# Patient Record
Sex: FEMALE | Race: WHITE | Employment: FULL TIME | URBAN - NONMETROPOLITAN AREA
[De-identification: names, ages, dates, MRNs, and addresses within clinical notes are randomized per-mention and may not be internally consistent; named-entity substitution may affect disease eponyms.]

---

## 2017-02-13 PROBLEM — J01.00 ACUTE MAXILLARY SINUSITIS: Status: ACTIVE | Noted: 2017-02-13

## 2017-03-15 DIAGNOSIS — R05.9 COUGH: Primary | ICD-10-CM

## 2017-03-15 DIAGNOSIS — J45.21 MILD INTERMITTENT ASTHMA WITH ACUTE EXACERBATION: ICD-10-CM

## 2017-03-15 RX ORDER — METHYLPREDNISOLONE 4 MG/1
TABLET ORAL
Qty: 1 KIT | Refills: 0 | Status: SHIPPED | OUTPATIENT
Start: 2017-03-15 | End: 2017-05-02 | Stop reason: ALTCHOICE

## 2017-03-15 RX ORDER — ALBUTEROL SULFATE 90 UG/1
2 AEROSOL, METERED RESPIRATORY (INHALATION) EVERY 6 HOURS PRN
Qty: 1 INHALER | Refills: 0 | Status: SHIPPED | OUTPATIENT
Start: 2017-03-15 | End: 2019-09-13 | Stop reason: SDUPTHER

## 2017-05-01 ENCOUNTER — HOSPITAL ENCOUNTER (OUTPATIENT)
Age: 42
Discharge: HOME | End: 2017-05-01
Payer: COMMERCIAL

## 2017-05-01 DIAGNOSIS — E78.49 OTHER HYPERLIPIDEMIA: ICD-10-CM

## 2017-05-01 LAB
ALBUMIN SERPL-MCNC: 4.8 G/DL (ref 3.5–5.2)
ALBUMIN/GLOBULIN RATIO: 1.8 (ref 1–2.5)
ALP BLD-CCNC: 76 U/L (ref 35–104)
ALT SERPL-CCNC: 24 U/L (ref 5–33)
ANION GAP SERPL CALCULATED.3IONS-SCNC: 15 MMOL/L (ref 9–17)
AST SERPL-CCNC: 20 U/L
BILIRUB SERPL-MCNC: 0.32 MG/DL (ref 0.3–1.2)
BUN BLDV-MCNC: 11 MG/DL (ref 6–20)
BUN/CREAT BLD: 20 (ref 9–20)
CALCIUM SERPL-MCNC: 9.9 MG/DL (ref 8.6–10.4)
CHLORIDE BLD-SCNC: 101 MMOL/L (ref 98–107)
CHOLESTEROL/HDL RATIO: 5.4
CHOLESTEROL: 252 MG/DL
CO2: 22 MMOL/L (ref 20–31)
CREAT SERPL-MCNC: 0.56 MG/DL (ref 0.5–0.9)
GFR AFRICAN AMERICAN: >60 ML/MIN
GFR NON-AFRICAN AMERICAN: >60 ML/MIN
GFR SERPL CREATININE-BSD FRML MDRD: ABNORMAL ML/MIN/{1.73_M2}
GFR SERPL CREATININE-BSD FRML MDRD: ABNORMAL ML/MIN/{1.73_M2}
GLUCOSE BLD-MCNC: 105 MG/DL (ref 70–99)
HCT VFR BLD CALC: 44.7 % (ref 36–46)
HDLC SERPL-MCNC: 47 MG/DL
HEMOGLOBIN: 15 G/DL (ref 12–16)
HIGH SENSITIVE C-REACTIVE PROTEIN: 2.8 MG/L
LDL CHOLESTEROL: 144 MG/DL (ref 0–130)
MCH RBC QN AUTO: 31.2 PG (ref 26–34)
MCHC RBC AUTO-ENTMCNC: 33.6 G/DL (ref 31–37)
MCV RBC AUTO: 92.9 FL (ref 80–100)
PDW BLD-RTO: 12.4 % (ref 12.1–15.2)
PLATELET # BLD: 187 K/UL (ref 140–450)
PMV BLD AUTO: NORMAL FL (ref 6–12)
POTASSIUM SERPL-SCNC: 4.4 MMOL/L (ref 3.7–5.3)
RBC # BLD: 4.81 M/UL (ref 4–5.2)
SODIUM BLD-SCNC: 138 MMOL/L (ref 135–144)
TOTAL PROTEIN: 7.5 G/DL (ref 6.4–8.3)
TRIGL SERPL-MCNC: 303 MG/DL
VLDLC SERPL CALC-MCNC: ABNORMAL MG/DL (ref 1–30)
WBC # BLD: 7 K/UL (ref 3.5–11)

## 2017-05-01 PROCEDURE — 80061 LIPID PANEL: CPT

## 2017-05-01 PROCEDURE — 85027 COMPLETE CBC AUTOMATED: CPT

## 2017-05-01 PROCEDURE — 86141 C-REACTIVE PROTEIN HS: CPT

## 2017-05-01 PROCEDURE — 80053 COMPREHEN METABOLIC PANEL: CPT

## 2017-09-13 DIAGNOSIS — J06.9 UPPER RESPIRATORY TRACT INFECTION, UNSPECIFIED TYPE: ICD-10-CM

## 2017-09-13 RX ORDER — AZITHROMYCIN 250 MG/1
TABLET, FILM COATED ORAL
Qty: 1 PACKET | Refills: 0 | Status: SHIPPED | OUTPATIENT
Start: 2017-09-13 | End: 2017-10-20 | Stop reason: ALTCHOICE

## 2017-10-20 PROBLEM — R05.9 COUGH: Status: ACTIVE | Noted: 2017-10-20

## 2017-10-20 PROBLEM — J40 BRONCHITIS: Status: ACTIVE | Noted: 2017-10-20

## 2017-10-20 PROBLEM — J01.90 ACUTE SINUSITIS: Status: ACTIVE | Noted: 2017-02-13

## 2017-11-16 ENCOUNTER — HOSPITAL ENCOUNTER (OUTPATIENT)
Dept: LAB | Age: 42
Discharge: HOME | End: 2017-11-16
Payer: COMMERCIAL

## 2017-11-16 DIAGNOSIS — E78.5 HYPERLIPIDEMIA, UNSPECIFIED: ICD-10-CM

## 2017-11-16 DIAGNOSIS — R73.9 HYPERGLYCEMIA: ICD-10-CM

## 2017-11-16 DIAGNOSIS — I10 ESSENTIAL HYPERTENSION: ICD-10-CM

## 2017-11-16 LAB
ALT SERPL-CCNC: 148 U/L (ref 5–33)
AST SERPL-CCNC: 106 U/L
ESTIMATED AVERAGE GLUCOSE: 111 MG/DL
HBA1C MFR BLD: 5.5 % (ref 4.8–5.9)

## 2017-11-16 PROCEDURE — 84450 TRANSFERASE (AST) (SGOT): CPT

## 2017-11-16 PROCEDURE — 83036 HEMOGLOBIN GLYCOSYLATED A1C: CPT

## 2017-11-16 PROCEDURE — 84460 ALANINE AMINO (ALT) (SGPT): CPT

## 2017-11-17 ENCOUNTER — OFFICE VISIT (OUTPATIENT)
Dept: FAMILY MEDICINE CLINIC | Age: 42
End: 2017-11-17
Payer: COMMERCIAL

## 2017-11-17 VITALS
HEIGHT: 66 IN | DIASTOLIC BLOOD PRESSURE: 78 MMHG | BODY MASS INDEX: 31.79 KG/M2 | SYSTOLIC BLOOD PRESSURE: 124 MMHG | WEIGHT: 197.8 LBS

## 2017-11-17 DIAGNOSIS — R73.9 HYPERGLYCEMIA: ICD-10-CM

## 2017-11-17 DIAGNOSIS — J06.9 UPPER RESPIRATORY TRACT INFECTION, UNSPECIFIED TYPE: ICD-10-CM

## 2017-11-17 DIAGNOSIS — Z23 NEED FOR IMMUNIZATION AGAINST INFLUENZA: ICD-10-CM

## 2017-11-17 DIAGNOSIS — R74.8 ELEVATED LIVER ENZYMES: Primary | ICD-10-CM

## 2017-11-17 DIAGNOSIS — E66.09 OBESITY DUE TO EXCESS CALORIES, UNSPECIFIED CLASSIFICATION, UNSPECIFIED WHETHER SERIOUS COMORBIDITY PRESENT: ICD-10-CM

## 2017-11-17 DIAGNOSIS — E78.49 OTHER HYPERLIPIDEMIA: ICD-10-CM

## 2017-11-17 PROCEDURE — 90688 IIV4 VACCINE SPLT 0.5 ML IM: CPT | Performed by: FAMILY MEDICINE

## 2017-11-17 PROCEDURE — 99214 OFFICE O/P EST MOD 30 MIN: CPT | Performed by: FAMILY MEDICINE

## 2017-11-17 PROCEDURE — 90471 IMMUNIZATION ADMIN: CPT | Performed by: FAMILY MEDICINE

## 2017-11-17 NOTE — PROGRESS NOTES
h/o    Urinary incontinence       Social History   Substance Use Topics    Smoking status: Former Smoker    Smokeless tobacco: Never Used      Comment: socially    Alcohol use 0.0 oz/week      Comment: occ      Current Outpatient Prescriptions   Medication Sig Dispense Refill    sertraline (ZOLOFT) 25 MG tablet Take 1 tablet by mouth daily 90 tablet 1    lovastatin (MEVACOR) 10 MG tablet Take 1 tablet by mouth nightly 90 tablet 3    loratadine (CLARITIN) 10 MG tablet Take 10 mg by mouth as needed       fluticasone-salmeterol (ADVAIR DISKUS) 250-50 MCG/DOSE AEPB Inhale 1 puff into the lungs every 12 hours 1 Inhaler 2    albuterol sulfate HFA (PROAIR HFA) 108 (90 BASE) MCG/ACT inhaler Inhale 2 puffs into the lungs every 6 hours as needed for Wheezing 1 Inhaler 0    Omega-3 Fatty Acids (FISH OIL) 1000 MG CAPS Take 3,000 mg by mouth nightly       Spacer/Aero-Holding Chambers (PlatformQ ROBBY) NATANAEL 1 Device by Does not apply route daily 1 Device 0    Ibuprofen (MOTRIN PO) Take  by mouth.  docusate sodium (COLACE) 100 MG capsule Take 1 capsule by mouth daily as needed for Constipation. 30 capsule 0     No current facility-administered medications for this visit. Allergies   Allergen Reactions    Latex      Redness, swelling, rash       PHYSICAL EXAM:    /78   Ht 5' 6\" (1.676 m)   Wt 197 lb 12.8 oz (89.7 kg)   BMI 31.93 kg/m²   Wt Readings from Last 3 Encounters:   11/17/17 197 lb 12.8 oz (89.7 kg)   06/05/17 186 lb (84.4 kg)   05/02/17 187 lb (84.8 kg)     BP Readings from Last 3 Encounters:   11/17/17 124/78   05/02/17 132/88   02/13/17 126/76       General Appearance: in no acute distress, well developed, well nourished. Eyes: pupils equal, round reactive to light and accommodation. Ears: normal canal and TM on the right. L TM dull. Nose: nares patent, no lesions. Oral Cavity: mucosa moist.  Throat: clear, floppy uvula.    Neck/Thyroid: neck supple, full range of motion, no cervical lymphadenopathy, no thyromegaly or carotid bruits. Skin: warm and dry. No suspicious lesions. Heart: regular rate and rhythm. No murmurs. S1, S2 normal, no gallops. Rate:70  Lungs: clear to auscultation bilaterally. Abdomen: bowel sounds present, soft, nontender, nondistended, no masses or organomegaly. Obese. Musculoskeletal: normal, full range of motion in knees and hips, no swelling or tenderness. Extremities: no cyanosis or edema. Peripheral Pulses: 2+ throughout, symetric. Neurologic: nonfocal, motor strength normal upper and lower extremities, sensory exam intact. Psych: normal affect, speech fluent. ASSESSMENT:  1. Elevated liver enzymes  ALT    AST   2. Hyperglycemia     3. Other hyperlipidemia     4. Obesity due to excess calories, unspecified classification, unspecified whether serious comorbidity present     5. Upper respiratory tract infection, unspecified type     6. Need for immunization against influenza  INFLUENZA, QUADV, 3 YRS AND OLDER, IM, MDV, 0.5ML (Komal Yousif)       PLAN:  She feels that her liver enzymes are affected by carbs. She just got back from vacation and had been drinking more alcohol than usual. I recommend that she wait a few weeks and have her liver enzymes checked again. I don't think that she needs medications for any of this at this time. I review her hemoglobin A1C level. Her A1C level is 5.5. I am pleased with this. The fullness in her ear sounds like eustachian tube dysfunction. She can take nasonex for this. We discuss blood pressure guidelines and how these are dependent upon each individual and their disease states and risk factors. We discuss her BMI. She is in the obese category. I encourage her to continue to make physical fitness a priority in order to try to get out of the obese category. She will get a flu shot today. I will see her back in 6 months.        Orders Placed This Encounter   Procedures    Evan Sayres, 3 YRS AND OLDER, IM, MDV, 0.5ML (Gomez Victor)    ALT     Standing Status:   Future     Standing Expiration Date:   11/17/2018    AST     Standing Status:   Future     Standing Expiration Date:   11/17/2018     No orders of the defined types were placed in this encounter.       Scribed by: FRAN Reich

## 2017-11-17 NOTE — PATIENT INSTRUCTIONS
PLAN:  She feels that her liver enzymes are affected by carbs. She just got back from vacation and had been drinking more alcohol than usual. I recommend that she wait a few weeks and have her liver enzymes checked again. I don't think that she needs medications for any of this at this time. I review her hemoglobin A1C level. Her A1C level is 5.5. I am pleased with this. The fullness in her ear sounds like eustachian tube dysfunction. She can take nasonex for this. We discuss blood pressure guidelines and how these are dependent upon each individual and their disease states and risk factors. We discuss her BMI. She is in the obese category. I encourage her to continue to make physical fitness a priority in order to try to get out of the obese category. She will get a flu shot today. I will see her back in 6 months. Patient Education        Influenza (Flu) Vaccine (Inactivated or Recombinant): What You Need to Know  Why get vaccinated? Influenza (\"flu\") is a contagious disease that spreads around the [facility] every winter, usually between October and May. Flu is caused by influenza viruses and is spread mainly by coughing, sneezing, and close contact. Anyone can get flu. Flu strikes suddenly and can last several days. Symptoms vary by age, but can include:  · Fever/chills. · Sore throat. · Muscle aches. · Fatigue. · Cough. · Headache. · Runny or stuffy nose. Flu can also lead to pneumonia and blood infections, and cause diarrhea and seizures in children. If you have a medical condition, such as heart or lung disease, flu can make it worse. Flu is more dangerous for some people. Infants and young children, people 72years of age and older, pregnant women, and people with certain health conditions or a weakened immune system are at greatest risk. Each year thousands of people in the [facility] die from flu, and many more are hospitalized.   Flu vaccine can:  · Keep you from getting flu. · Make flu less severe if you do get it. · Keep you from spreading flu to your family and other people. Inactivated and recombinant flu vaccines  A dose of flu vaccine is recommended every flu season. Children 6 months through 6years of age may need two doses during the same flu season. Everyone else needs only one dose each flu season. Some inactivated flu vaccines contain a very small amount of a mercury-based preservative called thimerosal. Studies have not shown thimerosal in vaccines to be harmful, but flu vaccines that do not contain thimerosal are available. There is no live flu virus in flu shots. They cannot cause the flu. There are many flu viruses, and they are always changing. Each year a new flu vaccine is made to protect against three or four viruses that are likely to cause disease in the upcoming flu season. But even when the vaccine doesn't exactly match these viruses, it may still provide some protection. Flu vaccine cannot prevent:  · Flu that is caused by a virus not covered by the vaccine. · Illnesses that look like flu but are not. Some people should not get this vaccine  Tell the person who is giving you the vaccine:  · If you have any severe (life-threatening) allergies. If you ever had a life-threatening allergic reaction after a dose of flu vaccine, or have a severe allergy to any part of this vaccine, you may be advised not to get vaccinated. Most, but not all, types of flu vaccine contain a small amount of egg protein. · If you ever had Guillain-Barré syndrome (also called GBS) Some people with a history of GBS should not get this vaccine. This should be discussed with your doctor. · If you are not feeling well. It is usually okay to get flu vaccine when you have a mild illness, but you might be asked to come back when you feel better. Risks of a vaccine reaction  With any medicine, including vaccines, there is a chance of reactions.  These are usually mild and go away on their own, but serious reactions are also possible. Most people who get a flu shot do not have any problems with it. Minor problems following a flu shot include:  · Soreness, redness, or swelling where the shot was given  · Hoarseness  · Sore, red or itchy eyes  · Cough  · Fever  · Aches  · Headache  · Itching  · Fatigue  If these problems occur, they usually begin soon after the shot and last 1 or 2 days. More serious problems following a flu shot can include the following:  · There may be a small increased risk of Guillain-Barré Syndrome (GBS) after inactivated flu vaccine. This risk has been estimated at 1 or 2 additional cases per million people vaccinated. This is much lower than the risk of severe complications from flu, which can be prevented by flu vaccine. · [place] children who get the flu shot along with pneumococcal vaccine (PCV13) and/or DTaP vaccine at the same time might be slightly more likely to have a seizure caused by fever. Ask your doctor for more information. Tell your doctor if a child who is getting flu vaccine has ever had a seizure  Problems that could happen after any injected vaccine:  · People sometimes faint after a medical procedure, including vaccination. Sitting or lying down for about 15 minutes can help prevent fainting, and injuries caused by a fall. Tell your doctor if you feel dizzy, or have vision changes or ringing in the ears. · Some people get severe pain in the shoulder and have difficulty moving the arm where a shot was given. This happens very rarely. · Any medication can cause a severe allergic reaction. Such reactions from a vaccine are very rare, estimated at about 1 in a million doses, and would happen within a few minutes to a few hours after the vaccination. As with any medicine, there is a very remote chance of a vaccine causing a serious injury or death. The safety of vaccines is always being monitored.  For more information, visit: www.cdc.gov/vaccinesafety/. What if there is a serious reaction? What should I look for? · Look for anything that concerns you, such as signs of a severe allergic reaction, very high fever, or unusual behavior. Signs of a severe allergic reaction can include hives, swelling of the face and throat, difficulty breathing, a fast heartbeat, dizziness, and weakness  usually within a few minutes to a few hours after the vaccination. What should I do? · If you think it is a severe allergic reaction or other emergency that can't wait, call 9-1-1 and get the person to the nearest hospital. Otherwise, call your doctor. · Reactions should be reported to the \"Vaccine Adverse Event Reporting System\" (VAERS). Your doctor should file this report, or you can do it yourself through the VAERS website at www.vaers. Predictify.gov, or by calling 2-927.953.3672. VAERS does not give medical advice. The National Vaccine Injury Compensation Program  The National Vaccine Injury Compensation Program (VICP) is a federal program that was created to compensate people who may have been injured by certain vaccines. Persons who believe they may have been injured by a vaccine can learn about the program and about filing a claim by calling 4-889.767.3817 or visiting the [facility] website at www.[facility].gov/vaccinecompensation. There is a time limit to file a claim for compensation. How can I learn more? · Ask your healthcare provider. He or she can give you the vaccine package insert or suggest other sources of information. · Call your local or state health department. · Contact the Centers for Disease Control and Prevention (CDC):  ¨ Call 3-692.148.1894 (1-800-CDC-INFO) or  ¨ Visit CDC's website at www.cdc.gov/flu  Vaccine Information Statement  Inactivated Influenza Vaccine  8/7/2015)  42 JESS Lindsey 790UI-29  Department of Health and Human Services  Centers for Disease Control and Prevention  Many Vaccine Information Statements are available in Latvian and other languages. See www.immunize.org/vis. Muchas hojas de información sobre vacunas están disponibles en español y en otros idiomas. Visite www.immunize.org/vis. Care instructions adapted under license by [facility] ([facility]). If you have questions about a medical condition or this instruction, always ask your healthcare professional. [place] [address] any warranty or liability for your use of this information.

## 2017-12-18 RX ORDER — SERTRALINE HYDROCHLORIDE 25 MG/1
25 TABLET, FILM COATED ORAL DAILY
Qty: 90 TABLET | Refills: 1 | Status: SHIPPED | OUTPATIENT
Start: 2017-12-18 | End: 2018-07-04 | Stop reason: SDUPTHER

## 2018-04-12 PROBLEM — R05.9 COUGH: Status: RESOLVED | Noted: 2017-10-20 | Resolved: 2018-04-12

## 2018-05-14 ENCOUNTER — TELEPHONE (OUTPATIENT)
Dept: FAMILY MEDICINE CLINIC | Age: 43
End: 2018-05-14

## 2018-05-14 DIAGNOSIS — I15.8 OTHER SECONDARY HYPERTENSION: ICD-10-CM

## 2018-05-14 DIAGNOSIS — E78.49 OTHER HYPERLIPIDEMIA: Primary | ICD-10-CM

## 2018-05-14 DIAGNOSIS — R73.9 HYPERGLYCEMIA: ICD-10-CM

## 2018-05-15 ENCOUNTER — HOSPITAL ENCOUNTER (OUTPATIENT)
Age: 43
Discharge: HOME | End: 2018-05-15
Payer: COMMERCIAL

## 2018-05-15 DIAGNOSIS — R73.9 HYPERGLYCEMIA: ICD-10-CM

## 2018-05-15 DIAGNOSIS — E78.49 OTHER HYPERLIPIDEMIA: ICD-10-CM

## 2018-05-15 DIAGNOSIS — I15.8 OTHER SECONDARY HYPERTENSION: ICD-10-CM

## 2018-05-15 LAB
ALBUMIN SERPL-MCNC: 4.5 G/DL (ref 3.5–5.2)
ALBUMIN/GLOBULIN RATIO: 1.6 (ref 1–2.5)
ALP BLD-CCNC: 67 U/L (ref 35–104)
ALT SERPL-CCNC: 108 U/L (ref 5–33)
ANION GAP SERPL CALCULATED.3IONS-SCNC: 13 MMOL/L (ref 9–17)
AST SERPL-CCNC: 82 U/L
BILIRUB SERPL-MCNC: 0.37 MG/DL (ref 0.3–1.2)
BUN BLDV-MCNC: 12 MG/DL (ref 6–20)
BUN/CREAT BLD: 22 (ref 9–20)
CALCIUM SERPL-MCNC: 9.5 MG/DL (ref 8.6–10.4)
CHLORIDE BLD-SCNC: 101 MMOL/L (ref 98–107)
CHOLESTEROL/HDL RATIO: 5.9
CHOLESTEROL: 275 MG/DL
CO2: 25 MMOL/L (ref 20–31)
CREAT SERPL-MCNC: 0.55 MG/DL (ref 0.5–0.9)
ESTIMATED AVERAGE GLUCOSE: 117 MG/DL
GFR AFRICAN AMERICAN: >60 ML/MIN
GFR NON-AFRICAN AMERICAN: >60 ML/MIN
GFR SERPL CREATININE-BSD FRML MDRD: ABNORMAL ML/MIN/{1.73_M2}
GFR SERPL CREATININE-BSD FRML MDRD: ABNORMAL ML/MIN/{1.73_M2}
GLUCOSE BLD-MCNC: 98 MG/DL (ref 70–99)
HBA1C MFR BLD: 5.7 % (ref 4.8–5.9)
HCT VFR BLD CALC: 43.5 % (ref 36.3–47.1)
HDLC SERPL-MCNC: 47 MG/DL
HEMOGLOBIN: 14.6 G/DL (ref 11.9–15.1)
HIGH SENSITIVE C-REACTIVE PROTEIN: 2.5 MG/L
LDL CHOLESTEROL: 176 MG/DL (ref 0–130)
MCH RBC QN AUTO: 32.1 PG (ref 25.2–33.5)
MCHC RBC AUTO-ENTMCNC: 33.6 G/DL (ref 28.4–34.8)
MCV RBC AUTO: 95.6 FL (ref 82.6–102.9)
NRBC AUTOMATED: 0 PER 100 WBC
PDW BLD-RTO: 12.3 % (ref 11.8–14.4)
PLATELET # BLD: 206 K/UL (ref 138–453)
PMV BLD AUTO: 10.2 FL (ref 8.1–13.5)
POTASSIUM SERPL-SCNC: 4.6 MMOL/L (ref 3.7–5.3)
RBC # BLD: 4.55 M/UL (ref 3.95–5.11)
SODIUM BLD-SCNC: 139 MMOL/L (ref 135–144)
TOTAL PROTEIN: 7.4 G/DL (ref 6.4–8.3)
TRIGL SERPL-MCNC: 260 MG/DL
VLDLC SERPL CALC-MCNC: ABNORMAL MG/DL (ref 1–30)
WBC # BLD: 6.5 K/UL (ref 3.5–11.3)

## 2018-05-15 PROCEDURE — 80061 LIPID PANEL: CPT

## 2018-05-15 PROCEDURE — 85027 COMPLETE CBC AUTOMATED: CPT

## 2018-05-15 PROCEDURE — 86141 C-REACTIVE PROTEIN HS: CPT

## 2018-05-15 PROCEDURE — 36415 COLL VENOUS BLD VENIPUNCTURE: CPT

## 2018-05-15 PROCEDURE — 80053 COMPREHEN METABOLIC PANEL: CPT

## 2018-05-15 PROCEDURE — 83036 HEMOGLOBIN GLYCOSYLATED A1C: CPT

## 2018-05-18 ENCOUNTER — OFFICE VISIT (OUTPATIENT)
Dept: FAMILY MEDICINE CLINIC | Age: 43
End: 2018-05-18
Payer: COMMERCIAL

## 2018-05-18 VITALS
DIASTOLIC BLOOD PRESSURE: 70 MMHG | HEIGHT: 66 IN | BODY MASS INDEX: 30.53 KG/M2 | WEIGHT: 190 LBS | SYSTOLIC BLOOD PRESSURE: 112 MMHG

## 2018-05-18 DIAGNOSIS — E78.49 OTHER HYPERLIPIDEMIA: ICD-10-CM

## 2018-05-18 DIAGNOSIS — J45.909 ASTHMA, UNSPECIFIED ASTHMA SEVERITY, UNSPECIFIED WHETHER COMPLICATED, UNSPECIFIED WHETHER PERSISTENT: ICD-10-CM

## 2018-05-18 DIAGNOSIS — L57.0 KERATOSIS: ICD-10-CM

## 2018-05-18 DIAGNOSIS — R74.8 ELEVATED LIVER ENZYMES: Primary | ICD-10-CM

## 2018-05-18 DIAGNOSIS — R73.9 HYPERGLYCEMIA: ICD-10-CM

## 2018-05-18 DIAGNOSIS — I10 ESSENTIAL HYPERTENSION: ICD-10-CM

## 2018-05-18 PROCEDURE — 99214 OFFICE O/P EST MOD 30 MIN: CPT | Performed by: FAMILY MEDICINE

## 2018-05-18 PROCEDURE — 17000 DESTRUCT PREMALG LESION: CPT | Performed by: FAMILY MEDICINE

## 2018-05-18 ASSESSMENT — PATIENT HEALTH QUESTIONNAIRE - PHQ9
SUM OF ALL RESPONSES TO PHQ QUESTIONS 1-9: 0
1. LITTLE INTEREST OR PLEASURE IN DOING THINGS: 0
2. FEELING DOWN, DEPRESSED OR HOPELESS: 0
SUM OF ALL RESPONSES TO PHQ9 QUESTIONS 1 & 2: 0

## 2018-05-23 ENCOUNTER — HOSPITAL ENCOUNTER (OUTPATIENT)
Age: 43
Setting detail: SPECIMEN
Discharge: HOME | End: 2018-05-23
Payer: COMMERCIAL

## 2018-05-23 DIAGNOSIS — R74.8 ELEVATED LIVER ENZYMES: ICD-10-CM

## 2018-05-23 PROCEDURE — 86317 IMMUNOASSAY INFECTIOUS AGENT: CPT

## 2018-05-23 PROCEDURE — 86790 VIRUS ANTIBODY NOS: CPT

## 2018-05-23 PROCEDURE — 86803 HEPATITIS C AB TEST: CPT

## 2018-05-23 PROCEDURE — 36415 COLL VENOUS BLD VENIPUNCTURE: CPT

## 2018-05-23 PROCEDURE — 87340 HEPATITIS B SURFACE AG IA: CPT

## 2018-05-24 LAB
HBV SURFACE AB TITR SER: >1000 MIU/ML
HEPATITIS B SURFACE ANTIGEN: NONREACTIVE
HEPATITIS C ANTIBODY: NONREACTIVE

## 2018-05-27 LAB — HEPATITIS E AB, IGG: NEGATIVE

## 2018-05-29 ENCOUNTER — TELEPHONE (OUTPATIENT)
Dept: FAMILY MEDICINE CLINIC | Age: 43
End: 2018-05-29

## 2018-05-29 DIAGNOSIS — R74.8 ELEVATED LIVER ENZYMES: Primary | ICD-10-CM

## 2018-06-07 ENCOUNTER — HOSPITAL ENCOUNTER (OUTPATIENT)
Dept: ULTRASOUND IMAGING | Age: 43
Discharge: HOME | End: 2018-06-09
Payer: COMMERCIAL

## 2018-06-07 ENCOUNTER — HOSPITAL ENCOUNTER (OUTPATIENT)
Age: 43
Discharge: HOME | End: 2018-06-07
Payer: COMMERCIAL

## 2018-06-07 DIAGNOSIS — R74.8 ELEVATED LIVER ENZYMES: ICD-10-CM

## 2018-06-07 LAB
CERULOPLASMIN: 28 MG/DL (ref 16–45)
FERRITIN: 169 UG/L (ref 13–150)
IRON SATURATION: 45 % (ref 20–55)
IRON: 128 UG/DL (ref 37–145)
TOTAL IRON BINDING CAPACITY: 284 UG/DL (ref 250–450)
TSH SERPL DL<=0.05 MIU/L-ACNC: 1.61 MIU/L (ref 0.3–5)
UNSATURATED IRON BINDING CAPACITY: 155.9 UG/DL (ref 112–347)

## 2018-06-07 PROCEDURE — 86038 ANTINUCLEAR ANTIBODIES: CPT

## 2018-06-07 PROCEDURE — 83550 IRON BINDING TEST: CPT

## 2018-06-07 PROCEDURE — 76705 ECHO EXAM OF ABDOMEN: CPT

## 2018-06-07 PROCEDURE — 83516 IMMUNOASSAY NONANTIBODY: CPT

## 2018-06-07 PROCEDURE — 84443 ASSAY THYROID STIM HORMONE: CPT

## 2018-06-07 PROCEDURE — 83540 ASSAY OF IRON: CPT

## 2018-06-07 PROCEDURE — 82728 ASSAY OF FERRITIN: CPT

## 2018-06-07 PROCEDURE — 36415 COLL VENOUS BLD VENIPUNCTURE: CPT

## 2018-06-07 PROCEDURE — 82390 ASSAY OF CERULOPLASMIN: CPT

## 2018-06-08 LAB
ANTI-NUCLEAR ANTIBODY (ANA): NEGATIVE
SMOOTH MUSCLE ANTIBODY: 4 UNITS (ref 0–19)

## 2018-07-06 RX ORDER — SERTRALINE HYDROCHLORIDE 25 MG/1
25 TABLET, FILM COATED ORAL DAILY
Qty: 90 TABLET | Refills: 1 | Status: SHIPPED | OUTPATIENT
Start: 2018-07-06 | End: 2019-01-25 | Stop reason: SDUPTHER

## 2018-09-25 RX ORDER — LOVASTATIN 10 MG/1
10 TABLET ORAL NIGHTLY
Qty: 90 TABLET | Refills: 3 | Status: SHIPPED | OUTPATIENT
Start: 2018-09-25 | End: 2019-01-18 | Stop reason: ALTCHOICE

## 2018-09-25 NOTE — TELEPHONE ENCOUNTER
From: Alexa Pate  Sent: 9/25/2018 9:42 AM EDT  Subject: Medication Renewal Request    Alexa Pate would like a refill of the following medications:     lovastatin (MEVACOR) 10 MG tablet Genevieve Leahy MD]    Preferred pharmacy: [address] [place] [address], [address] [place] [address]    Comment:

## 2018-10-02 DIAGNOSIS — T30.0 BURN: Primary | ICD-10-CM

## 2018-11-12 ENCOUNTER — OFFICE VISIT (OUTPATIENT)
Dept: PRIMARY CARE CLINIC | Age: 43
End: 2018-11-12
Payer: COMMERCIAL

## 2018-11-12 VITALS
SYSTOLIC BLOOD PRESSURE: 129 MMHG | DIASTOLIC BLOOD PRESSURE: 101 MMHG | HEART RATE: 79 BPM | BODY MASS INDEX: 32.14 KG/M2 | WEIGHT: 199.1 LBS | TEMPERATURE: 97.7 F

## 2018-11-12 DIAGNOSIS — J01.40 ACUTE NON-RECURRENT PANSINUSITIS: Primary | ICD-10-CM

## 2018-11-12 PROCEDURE — 99213 OFFICE O/P EST LOW 20 MIN: CPT | Performed by: NURSE PRACTITIONER

## 2018-11-12 RX ORDER — AMOXICILLIN AND CLAVULANATE POTASSIUM 875; 125 MG/1; MG/1
1 TABLET, FILM COATED ORAL 2 TIMES DAILY
Qty: 20 TABLET | Refills: 0 | Status: SHIPPED | OUTPATIENT
Start: 2018-11-12 | End: 2018-11-22

## 2018-11-12 RX ORDER — PREDNISONE 20 MG/1
40 TABLET ORAL DAILY
Qty: 10 TABLET | Refills: 0 | Status: SHIPPED | OUTPATIENT
Start: 2018-11-12 | End: 2018-11-17

## 2018-11-12 ASSESSMENT — ENCOUNTER SYMPTOMS
SORE THROAT: 1
SINUS PRESSURE: 1
SWOLLEN GLANDS: 0
SHORTNESS OF BREATH: 0
COUGH: 1
HOARSE VOICE: 0
SINUS COMPLAINT: 1

## 2018-11-12 NOTE — PROGRESS NOTES
and dry. No rash noted. Nursing note and vitals reviewed. BP (!) 129/101 (Site: Left Upper Arm, Position: Sitting, Cuff Size: Large Adult)   Pulse 79   Temp 97.7 °F (36.5 °C) (Temporal)   Wt 199 lb 1.6 oz (90.3 kg)   BMI 32.14 kg/m²     Assessment:      Diagnosis Orders   1. Acute non-recurrent pansinusitis  amoxicillin-clavulanate (AUGMENTIN) 875-125 MG per tablet    predniSONE (DELTASONE) 20 MG tablet       Plan:             Discussed exam, POCT findings, plan of care (including prescriptive and supportive as listed below) and follow-up atlength with patient and or Patient. Reviewed all prescribed and recommended medications, administration and side effects. Encouraged to return to [address] [place] [address] for noimprovement and or worsening of symptoms. To ER or call 911 if any difficulty breathing, shortness of breath, inability to swallow, hives or temp greater than 103 degrees. Questions answered. They verbalized understandingand agreement. Return if symptoms worsen or fail to improve. Orders Placed This Encounter   Medications    amoxicillin-clavulanate (AUGMENTIN) 875-125 MG per tablet     Sig: Take 1 tablet by mouth 2 times daily for 10 days     Dispense:  20 tablet     Refill:  0    predniSONE (DELTASONE) 20 MG tablet     Sig: Take 2 tablets by mouth daily for 5 days     Dispense:  10 tablet     Refill:  0          All patient questions answered. Pt voiced understanding.       Electronically signed by DYAN Santana CNP on 11/12/2018 at 12:27 PM

## 2019-01-15 ENCOUNTER — TELEPHONE (OUTPATIENT)
Dept: FAMILY MEDICINE CLINIC | Age: 44
End: 2019-01-15

## 2019-01-15 DIAGNOSIS — I10 ESSENTIAL HYPERTENSION: Primary | ICD-10-CM

## 2019-01-15 DIAGNOSIS — R73.9 HYPERGLYCEMIA: ICD-10-CM

## 2019-01-15 DIAGNOSIS — E78.49 OTHER HYPERLIPIDEMIA: ICD-10-CM

## 2019-01-17 ENCOUNTER — HOSPITAL ENCOUNTER (OUTPATIENT)
Age: 44
Setting detail: SPECIMEN
Discharge: HOME | End: 2019-01-17
Payer: COMMERCIAL

## 2019-01-17 DIAGNOSIS — R73.9 HYPERGLYCEMIA: ICD-10-CM

## 2019-01-17 DIAGNOSIS — I10 ESSENTIAL HYPERTENSION: ICD-10-CM

## 2019-01-17 DIAGNOSIS — E78.49 OTHER HYPERLIPIDEMIA: ICD-10-CM

## 2019-01-17 LAB
ALBUMIN SERPL-MCNC: 4.8 G/DL (ref 3.5–5.2)
ALBUMIN/GLOBULIN RATIO: 2.2 (ref 1–2.5)
ALP BLD-CCNC: 66 U/L (ref 35–104)
ALT SERPL-CCNC: 78 U/L (ref 5–33)
ANION GAP SERPL CALCULATED.3IONS-SCNC: 14 MMOL/L (ref 9–17)
AST SERPL-CCNC: 50 U/L
BILIRUB SERPL-MCNC: 0.53 MG/DL (ref 0.3–1.2)
BUN BLDV-MCNC: 12 MG/DL (ref 6–20)
BUN/CREAT BLD: 18 (ref 9–20)
CALCIUM SERPL-MCNC: 10.2 MG/DL (ref 8.6–10.4)
CHLORIDE BLD-SCNC: 101 MMOL/L (ref 98–107)
CHOLESTEROL/HDL RATIO: 6.2
CHOLESTEROL: 316 MG/DL
CO2: 23 MMOL/L (ref 20–31)
CREAT SERPL-MCNC: 0.66 MG/DL (ref 0.5–0.9)
ESTIMATED AVERAGE GLUCOSE: 114 MG/DL
GFR AFRICAN AMERICAN: >60 ML/MIN
GFR NON-AFRICAN AMERICAN: >60 ML/MIN
GFR SERPL CREATININE-BSD FRML MDRD: ABNORMAL ML/MIN/{1.73_M2}
GFR SERPL CREATININE-BSD FRML MDRD: ABNORMAL ML/MIN/{1.73_M2}
GLUCOSE BLD-MCNC: 125 MG/DL (ref 70–99)
HBA1C MFR BLD: 5.6 % (ref 4.8–5.9)
HCT VFR BLD CALC: 44.2 % (ref 36.3–47.1)
HDLC SERPL-MCNC: 51 MG/DL
HEMOGLOBIN: 14.9 G/DL (ref 11.9–15.1)
HIGH SENSITIVE C-REACTIVE PROTEIN: 2.4 MG/L
LDL CHOLESTEROL: 216 MG/DL (ref 0–130)
MCH RBC QN AUTO: 32.7 PG (ref 25.2–33.5)
MCHC RBC AUTO-ENTMCNC: 33.7 G/DL (ref 28.4–34.8)
MCV RBC AUTO: 97.1 FL (ref 82.6–102.9)
NRBC AUTOMATED: 0 PER 100 WBC
PDW BLD-RTO: 12.1 % (ref 11.8–14.4)
PLATELET # BLD: 226 K/UL (ref 138–453)
PMV BLD AUTO: 10.6 FL (ref 8.1–13.5)
POTASSIUM SERPL-SCNC: 4.5 MMOL/L (ref 3.7–5.3)
RBC # BLD: 4.55 M/UL (ref 3.95–5.11)
SODIUM BLD-SCNC: 138 MMOL/L (ref 135–144)
TOTAL PROTEIN: 7 G/DL (ref 6.4–8.3)
TRIGL SERPL-MCNC: 246 MG/DL
VLDLC SERPL CALC-MCNC: ABNORMAL MG/DL (ref 1–30)
WBC # BLD: 5.2 K/UL (ref 3.5–11.3)

## 2019-01-17 PROCEDURE — 80061 LIPID PANEL: CPT

## 2019-01-17 PROCEDURE — 85027 COMPLETE CBC AUTOMATED: CPT

## 2019-01-17 PROCEDURE — 80053 COMPREHEN METABOLIC PANEL: CPT

## 2019-01-17 PROCEDURE — 86141 C-REACTIVE PROTEIN HS: CPT

## 2019-01-17 PROCEDURE — 36415 COLL VENOUS BLD VENIPUNCTURE: CPT

## 2019-01-17 PROCEDURE — 83036 HEMOGLOBIN GLYCOSYLATED A1C: CPT

## 2019-01-18 ENCOUNTER — OFFICE VISIT (OUTPATIENT)
Dept: FAMILY MEDICINE CLINIC | Age: 44
End: 2019-01-18
Payer: COMMERCIAL

## 2019-01-18 VITALS
SYSTOLIC BLOOD PRESSURE: 126 MMHG | WEIGHT: 191.6 LBS | DIASTOLIC BLOOD PRESSURE: 88 MMHG | HEIGHT: 66 IN | BODY MASS INDEX: 30.79 KG/M2

## 2019-01-18 DIAGNOSIS — E78.5 HYPERLIPIDEMIA, UNSPECIFIED HYPERLIPIDEMIA TYPE: ICD-10-CM

## 2019-01-18 DIAGNOSIS — K76.0 FATTY LIVER DISEASE, NONALCOHOLIC: Primary | ICD-10-CM

## 2019-01-18 DIAGNOSIS — R73.9 HYPERGLYCEMIA: ICD-10-CM

## 2019-01-18 DIAGNOSIS — I10 HYPERTENSION, UNSPECIFIED TYPE: ICD-10-CM

## 2019-01-18 PROCEDURE — 99214 OFFICE O/P EST MOD 30 MIN: CPT | Performed by: FAMILY MEDICINE

## 2019-01-18 RX ORDER — ROSUVASTATIN CALCIUM 10 MG/1
10 TABLET, COATED ORAL DAILY
Qty: 90 TABLET | Refills: 3 | Status: SHIPPED | OUTPATIENT
Start: 2019-01-18 | End: 2019-08-05 | Stop reason: ALTCHOICE

## 2019-01-25 RX ORDER — SERTRALINE HYDROCHLORIDE 25 MG/1
TABLET, FILM COATED ORAL
Qty: 90 TABLET | Refills: 1 | Status: SHIPPED | OUTPATIENT
Start: 2019-01-25 | End: 2019-08-28 | Stop reason: SDUPTHER

## 2019-03-07 ENCOUNTER — HOSPITAL ENCOUNTER (OUTPATIENT)
Age: 44
Discharge: HOME | End: 2019-03-07
Payer: COMMERCIAL

## 2019-07-18 ENCOUNTER — HOSPITAL ENCOUNTER (OUTPATIENT)
Age: 44
Discharge: HOME | End: 2019-07-18
Payer: COMMERCIAL

## 2019-07-18 DIAGNOSIS — E78.5 HYPERLIPIDEMIA, UNSPECIFIED HYPERLIPIDEMIA TYPE: ICD-10-CM

## 2019-07-18 DIAGNOSIS — R73.9 HYPERGLYCEMIA: ICD-10-CM

## 2019-07-18 LAB
ALBUMIN SERPL-MCNC: 4.7 G/DL (ref 3.5–5.2)
ALBUMIN/GLOBULIN RATIO: 1.9 (ref 1–2.5)
ALP BLD-CCNC: 72 U/L (ref 35–104)
ALT SERPL-CCNC: 31 U/L (ref 5–33)
ANION GAP SERPL CALCULATED.3IONS-SCNC: 12 MMOL/L (ref 9–17)
AST SERPL-CCNC: 25 U/L
BILIRUB SERPL-MCNC: 0.4 MG/DL (ref 0.3–1.2)
BUN BLDV-MCNC: 14 MG/DL (ref 6–20)
BUN/CREAT BLD: 24 (ref 9–20)
CALCIUM SERPL-MCNC: 9.7 MG/DL (ref 8.6–10.4)
CHLORIDE BLD-SCNC: 104 MMOL/L (ref 98–107)
CHOLESTEROL/HDL RATIO: 5.6
CHOLESTEROL: 252 MG/DL
CO2: 23 MMOL/L (ref 20–31)
CREAT SERPL-MCNC: 0.58 MG/DL (ref 0.5–0.9)
ESTIMATED AVERAGE GLUCOSE: 111 MG/DL
GFR AFRICAN AMERICAN: >60 ML/MIN
GFR NON-AFRICAN AMERICAN: >60 ML/MIN
GFR SERPL CREATININE-BSD FRML MDRD: ABNORMAL ML/MIN/{1.73_M2}
GFR SERPL CREATININE-BSD FRML MDRD: ABNORMAL ML/MIN/{1.73_M2}
GLUCOSE BLD-MCNC: 107 MG/DL (ref 70–99)
HBA1C MFR BLD: 5.5 % (ref 4.8–5.9)
HCT VFR BLD CALC: 44.9 % (ref 36.3–47.1)
HDLC SERPL-MCNC: 45 MG/DL
HEMOGLOBIN: 15.1 G/DL (ref 11.9–15.1)
HIGH SENSITIVE C-REACTIVE PROTEIN: 3.7 MG/L
LDL CHOLESTEROL: 153 MG/DL (ref 0–130)
MCH RBC QN AUTO: 32.9 PG (ref 25.2–33.5)
MCHC RBC AUTO-ENTMCNC: 33.6 G/DL (ref 28.4–34.8)
MCV RBC AUTO: 97.8 FL (ref 82.6–102.9)
NRBC AUTOMATED: 0 PER 100 WBC
PDW BLD-RTO: 12.2 % (ref 11.8–14.4)
PLATELET # BLD: 212 K/UL (ref 138–453)
PMV BLD AUTO: 10.6 FL (ref 8.1–13.5)
POTASSIUM SERPL-SCNC: 4.3 MMOL/L (ref 3.7–5.3)
RBC # BLD: 4.59 M/UL (ref 3.95–5.11)
SODIUM BLD-SCNC: 139 MMOL/L (ref 135–144)
TOTAL PROTEIN: 7.2 G/DL (ref 6.4–8.3)
TRIGL SERPL-MCNC: 272 MG/DL
VLDLC SERPL CALC-MCNC: ABNORMAL MG/DL (ref 1–30)
WBC # BLD: 8.1 K/UL (ref 3.5–11.3)

## 2019-07-18 PROCEDURE — 85027 COMPLETE CBC AUTOMATED: CPT

## 2019-07-18 PROCEDURE — 80061 LIPID PANEL: CPT

## 2019-07-18 PROCEDURE — 86141 C-REACTIVE PROTEIN HS: CPT

## 2019-07-18 PROCEDURE — 83036 HEMOGLOBIN GLYCOSYLATED A1C: CPT

## 2019-07-18 PROCEDURE — 36415 COLL VENOUS BLD VENIPUNCTURE: CPT

## 2019-07-18 PROCEDURE — 80053 COMPREHEN METABOLIC PANEL: CPT

## 2019-07-19 ENCOUNTER — OFFICE VISIT (OUTPATIENT)
Dept: FAMILY MEDICINE CLINIC | Age: 44
End: 2019-07-19
Payer: COMMERCIAL

## 2019-07-19 VITALS
HEIGHT: 66 IN | DIASTOLIC BLOOD PRESSURE: 68 MMHG | BODY MASS INDEX: 30.05 KG/M2 | WEIGHT: 187 LBS | SYSTOLIC BLOOD PRESSURE: 124 MMHG

## 2019-07-19 DIAGNOSIS — R10.9 FLANK PAIN: ICD-10-CM

## 2019-07-19 DIAGNOSIS — R73.9 HYPERGLYCEMIA: ICD-10-CM

## 2019-07-19 DIAGNOSIS — K76.0 FATTY LIVER DISEASE, NONALCOHOLIC: Primary | ICD-10-CM

## 2019-07-19 DIAGNOSIS — E78.49 OTHER HYPERLIPIDEMIA: ICD-10-CM

## 2019-07-19 LAB
BACTERIA URINE, POC: NORMAL
BILIRUBIN URINE: 0 MG/DL
BLOOD, URINE: NEGATIVE
CASTS URINE, POC: 0
CLARITY: CLEAR
COLOR: YELLOW
CRYSTALS URINE, POC: 0
EPI CELLS URINE, POC: NORMAL
GLUCOSE URINE: NORMAL
KETONES, URINE: NEGATIVE
LEUKOCYTE EST, POC: NORMAL
NITRITE, URINE: NEGATIVE
PH UA: 5 (ref 4.5–8)
PROTEIN UA: NEGATIVE
RBC URINE, POC: NORMAL
SPECIFIC GRAVITY UA: 1.01 (ref 1–1.03)
UROBILINOGEN, URINE: NORMAL
WBC URINE, POC: NORMAL
YEAST URINE, POC: 0

## 2019-07-19 PROCEDURE — 99214 OFFICE O/P EST MOD 30 MIN: CPT | Performed by: FAMILY MEDICINE

## 2019-07-19 PROCEDURE — 81000 URINALYSIS NONAUTO W/SCOPE: CPT | Performed by: FAMILY MEDICINE

## 2019-07-19 RX ORDER — EZETIMIBE 10 MG/1
10 TABLET ORAL DAILY
Qty: 90 TABLET | Refills: 3 | Status: SHIPPED | OUTPATIENT
Start: 2019-07-19 | End: 2020-03-09 | Stop reason: SDUPTHER

## 2019-07-19 RX ORDER — SULFAMETHOXAZOLE AND TRIMETHOPRIM 800; 160 MG/1; MG/1
1 TABLET ORAL 2 TIMES DAILY
Qty: 20 TABLET | Refills: 0 | Status: SHIPPED | OUTPATIENT
Start: 2019-07-19 | End: 2019-09-10

## 2019-07-19 ASSESSMENT — PATIENT HEALTH QUESTIONNAIRE - PHQ9
SUM OF ALL RESPONSES TO PHQ QUESTIONS 1-9: 0
1. LITTLE INTEREST OR PLEASURE IN DOING THINGS: 0
SUM OF ALL RESPONSES TO PHQ9 QUESTIONS 1 & 2: 0
2. FEELING DOWN, DEPRESSED OR HOPELESS: 0
SUM OF ALL RESPONSES TO PHQ QUESTIONS 1-9: 0

## 2019-07-19 NOTE — PROGRESS NOTES
Pt urine came back showing blood and leukocytes.  Per Dr. Mere Bustos treat with Bactrim DS BID x 7 days and repeat urine after    Also, Pts Livalo is not covered, Per Dr. Mere Bustos switch to Zetia 10 mg daily
Past Medical History:   Diagnosis Date    Allergic rhinitis 1995    Asthma 2011    Elevated liver enzymes 2011    Fatty liver disease, nonalcoholic 8/99/8334    Rufus's syndrome     Hyperlipidemia 2011    Hypertension 2011    h/o now controlled    Migraine     Obesity due to excess calories 11/17/2017    PIH (pregnancy induced hypertension) 2008    Pneumonia 2009    h/o    Urinary incontinence       Social History     Tobacco Use    Smoking status: Former Smoker    Smokeless tobacco: Never Used    Tobacco comment: socially   Substance Use Topics    Alcohol use: Yes     Alcohol/week: 0.0 standard drinks     Comment: occ      Current Outpatient Medications   Medication Sig Dispense Refill    Multiple Vitamins-Minerals (EQ MULTIVITAMINS ADULT GUMMY PO) Take by mouth daily      sulfamethoxazole-trimethoprim (BACTRIM DS) 800-160 MG per tablet Take 1 tablet by mouth 2 times daily 20 tablet 0    ezetimibe (ZETIA) 10 MG tablet Take 1 tablet by mouth daily 90 tablet 3    sertraline (ZOLOFT) 25 MG tablet TAKE 1 TABLET BY MOUTH ONCE DAILY 90 tablet 1    albuterol sulfate HFA (PROAIR HFA) 108 (90 BASE) MCG/ACT inhaler Inhale 2 puffs into the lungs every 6 hours as needed for Wheezing 1 Inhaler 0    loratadine (CLARITIN) 10 MG tablet Take 10 mg by mouth as needed       Spacer/Aero-Holding Chambers ([facility] ROBBY) NATANAEL 1 Device by Does not apply route daily 1 Device 0    Ibuprofen (MOTRIN PO) Take by mouth daily as needed       docusate sodium (COLACE) 100 MG capsule Take 1 capsule by mouth daily as needed for Constipation. 30 capsule 0    rosuvastatin (CRESTOR) 10 MG tablet Take 1 tablet by mouth daily (Patient not taking: Reported on 7/19/2019) 90 tablet 3     No current facility-administered medications for this visit.       Allergies   Allergen Reactions    Latex      Redness, swelling, rash       PHYSICAL EXAM:    /68   Ht 5' 6\" (1.676 m)   Wt 187 lb (84.8 kg)   BMI 30.18 kg/m²

## 2019-08-05 ENCOUNTER — TELEPHONE (OUTPATIENT)
Dept: FAMILY MEDICINE CLINIC | Age: 44
End: 2019-08-05

## 2019-08-05 RX ORDER — PRAVASTATIN SODIUM 20 MG
20 TABLET ORAL DAILY
Qty: 90 TABLET | Refills: 3 | Status: SHIPPED | OUTPATIENT
Start: 2019-08-05 | End: 2019-08-06 | Stop reason: CLARIF

## 2019-08-28 RX ORDER — SERTRALINE HYDROCHLORIDE 25 MG/1
TABLET, FILM COATED ORAL
Qty: 90 TABLET | Refills: 1 | Status: SHIPPED | OUTPATIENT
Start: 2019-08-28 | End: 2020-03-09 | Stop reason: SDUPTHER

## 2019-09-06 DIAGNOSIS — J01.01 ACUTE RECURRENT MAXILLARY SINUSITIS: Primary | ICD-10-CM

## 2019-09-06 RX ORDER — AZITHROMYCIN 250 MG/1
TABLET, FILM COATED ORAL
Qty: 1 PACKET | Refills: 0 | Status: SHIPPED | OUTPATIENT
Start: 2019-09-06 | End: 2019-09-11 | Stop reason: ALTCHOICE

## 2019-09-10 ENCOUNTER — TELEPHONE (OUTPATIENT)
Dept: FAMILY MEDICINE CLINIC | Age: 44
End: 2019-09-10

## 2019-09-10 ENCOUNTER — HOSPITAL ENCOUNTER (EMERGENCY)
Age: 44
Discharge: HOME | End: 2019-09-10
Attending: EMERGENCY MEDICINE
Payer: COMMERCIAL

## 2019-09-10 VITALS
RESPIRATION RATE: 16 BRPM | TEMPERATURE: 98.3 F | OXYGEN SATURATION: 99 % | HEART RATE: 88 BPM | BODY MASS INDEX: 31.31 KG/M2 | WEIGHT: 194 LBS | SYSTOLIC BLOOD PRESSURE: 133 MMHG | DIASTOLIC BLOOD PRESSURE: 83 MMHG

## 2019-09-10 DIAGNOSIS — H73.011 BULLOUS MYRINGITIS OF RIGHT EAR: Primary | ICD-10-CM

## 2019-09-10 PROCEDURE — 99282 EMERGENCY DEPT VISIT SF MDM: CPT

## 2019-09-10 PROCEDURE — 6370000000 HC RX 637 (ALT 250 FOR IP): Performed by: EMERGENCY MEDICINE

## 2019-09-10 PROCEDURE — 96372 THER/PROPH/DIAG INJ SC/IM: CPT

## 2019-09-10 PROCEDURE — 6360000002 HC RX W HCPCS: Performed by: EMERGENCY MEDICINE

## 2019-09-10 RX ORDER — AMOXICILLIN AND CLAVULANATE POTASSIUM 875; 125 MG/1; MG/1
1 TABLET, FILM COATED ORAL 2 TIMES DAILY
Qty: 14 TABLET | Refills: 0 | Status: SHIPPED | OUTPATIENT
Start: 2019-09-10 | End: 2019-09-17

## 2019-09-10 RX ORDER — KETOROLAC TROMETHAMINE 15 MG/ML
15 INJECTION, SOLUTION INTRAMUSCULAR; INTRAVENOUS ONCE
Status: COMPLETED | OUTPATIENT
Start: 2019-09-10 | End: 2019-09-10

## 2019-09-10 RX ORDER — AMOXICILLIN AND CLAVULANATE POTASSIUM 875; 125 MG/1; MG/1
1 TABLET, FILM COATED ORAL ONCE
Status: COMPLETED | OUTPATIENT
Start: 2019-09-10 | End: 2019-09-10

## 2019-09-10 RX ORDER — TETRACAINE HYDROCHLORIDE 5 MG/ML
2 SOLUTION OPHTHALMIC ONCE
Status: COMPLETED | OUTPATIENT
Start: 2019-09-10 | End: 2019-09-10

## 2019-09-10 RX ADMIN — AMOXICILLIN AND CLAVULANATE POTASSIUM 1 TABLET: 875; 125 TABLET, FILM COATED ORAL at 08:16

## 2019-09-10 RX ADMIN — TETRACAINE HYDROCHLORIDE 2 DROP: 5 SOLUTION OPHTHALMIC at 08:16

## 2019-09-10 RX ADMIN — KETOROLAC TROMETHAMINE 15 MG: 15 INJECTION, SOLUTION INTRAMUSCULAR; INTRAVENOUS at 08:16

## 2019-09-10 ASSESSMENT — PAIN DESCRIPTION - LOCATION: LOCATION: EAR

## 2019-09-10 ASSESSMENT — PAIN DESCRIPTION - ORIENTATION: ORIENTATION: RIGHT

## 2019-09-10 ASSESSMENT — PAIN SCALES - GENERAL
PAINLEVEL_OUTOF10: 4
PAINLEVEL_OUTOF10: 8
PAINLEVEL_OUTOF10: 8
PAINLEVEL_OUTOF10: 5

## 2019-09-10 ASSESSMENT — PAIN DESCRIPTION - PAIN TYPE: TYPE: ACUTE PAIN

## 2019-09-10 ASSESSMENT — PAIN DESCRIPTION - DESCRIPTORS: DESCRIPTORS: SHARP;STABBING

## 2019-09-11 ENCOUNTER — OFFICE VISIT (OUTPATIENT)
Dept: FAMILY MEDICINE CLINIC | Age: 44
End: 2019-09-11
Payer: COMMERCIAL

## 2019-09-11 VITALS
WEIGHT: 194 LBS | DIASTOLIC BLOOD PRESSURE: 82 MMHG | HEIGHT: 66 IN | SYSTOLIC BLOOD PRESSURE: 122 MMHG | BODY MASS INDEX: 31.18 KG/M2

## 2019-09-11 DIAGNOSIS — H66.91 RIGHT OTITIS MEDIA, UNSPECIFIED OTITIS MEDIA TYPE: Primary | ICD-10-CM

## 2019-09-11 PROCEDURE — 99213 OFFICE O/P EST LOW 20 MIN: CPT | Performed by: FAMILY MEDICINE

## 2019-09-13 ENCOUNTER — TELEPHONE (OUTPATIENT)
Dept: FAMILY MEDICINE CLINIC | Age: 44
End: 2019-09-13

## 2019-09-13 DIAGNOSIS — J45.21 MILD INTERMITTENT ASTHMA WITH ACUTE EXACERBATION: ICD-10-CM

## 2019-09-13 DIAGNOSIS — R05.9 COUGH: ICD-10-CM

## 2019-09-13 RX ORDER — ALBUTEROL SULFATE 90 UG/1
2 AEROSOL, METERED RESPIRATORY (INHALATION) EVERY 6 HOURS PRN
Qty: 1 INHALER | Refills: 0 | Status: SHIPPED | OUTPATIENT
Start: 2019-09-13 | End: 2019-11-02 | Stop reason: SDUPTHER

## 2019-09-13 NOTE — TELEPHONE ENCOUNTER
mucinex and nasal steroid spray would be helpful and now that pain is gone she can perform insufflation maneuvers as well

## 2019-09-22 DIAGNOSIS — H92.01 RIGHT EAR PAIN: Primary | ICD-10-CM

## 2019-09-22 DIAGNOSIS — H65.04 RECURRENT ACUTE SEROUS OTITIS MEDIA OF RIGHT EAR: ICD-10-CM

## 2019-09-22 RX ORDER — METHYLPREDNISOLONE 4 MG/1
TABLET ORAL
Qty: 1 KIT | Refills: 0 | Status: SHIPPED | OUTPATIENT
Start: 2019-09-22 | End: 2019-11-02 | Stop reason: SDUPTHER

## 2019-10-23 ENCOUNTER — HOSPITAL ENCOUNTER (OUTPATIENT)
Age: 44
Discharge: HOME | End: 2019-10-25
Payer: COMMERCIAL

## 2019-10-23 ENCOUNTER — HOSPITAL ENCOUNTER (OUTPATIENT)
Dept: WOMENS IMAGING | Age: 44
Discharge: HOME | End: 2019-10-25
Payer: COMMERCIAL

## 2019-10-23 DIAGNOSIS — Z12.31 BREAST CANCER SCREENING BY MAMMOGRAM: ICD-10-CM

## 2019-10-23 PROCEDURE — 77063 BREAST TOMOSYNTHESIS BI: CPT

## 2019-10-31 ENCOUNTER — OFFICE VISIT (OUTPATIENT)
Dept: OBGYN | Age: 44
End: 2019-10-31
Payer: COMMERCIAL

## 2019-10-31 ENCOUNTER — HOSPITAL ENCOUNTER (OUTPATIENT)
Age: 44
Setting detail: SPECIMEN
Discharge: HOME | End: 2019-10-31
Payer: COMMERCIAL

## 2019-10-31 VITALS
BODY MASS INDEX: 31.82 KG/M2 | DIASTOLIC BLOOD PRESSURE: 84 MMHG | SYSTOLIC BLOOD PRESSURE: 138 MMHG | HEIGHT: 66 IN | WEIGHT: 198 LBS

## 2019-10-31 DIAGNOSIS — Z01.419 WELL WOMAN EXAM WITH ROUTINE GYNECOLOGICAL EXAM: ICD-10-CM

## 2019-10-31 DIAGNOSIS — Z01.419 WELL WOMAN EXAM WITH ROUTINE GYNECOLOGICAL EXAM: Primary | ICD-10-CM

## 2019-10-31 DIAGNOSIS — Z11.51 SCREENING FOR HPV (HUMAN PAPILLOMAVIRUS): ICD-10-CM

## 2019-10-31 PROCEDURE — 87624 HPV HI-RISK TYP POOLED RSLT: CPT

## 2019-10-31 PROCEDURE — G0145 SCR C/V CYTO,THINLAYER,RESCR: HCPCS

## 2019-10-31 PROCEDURE — 99396 PREV VISIT EST AGE 40-64: CPT | Performed by: ADVANCED PRACTICE MIDWIFE

## 2019-11-02 DIAGNOSIS — R05.9 COUGH: ICD-10-CM

## 2019-11-02 DIAGNOSIS — H65.04 RECURRENT ACUTE SEROUS OTITIS MEDIA OF RIGHT EAR: ICD-10-CM

## 2019-11-02 DIAGNOSIS — H92.01 RIGHT EAR PAIN: ICD-10-CM

## 2019-11-02 DIAGNOSIS — J45.21 MILD INTERMITTENT ASTHMA WITH ACUTE EXACERBATION: ICD-10-CM

## 2019-11-02 RX ORDER — ALBUTEROL SULFATE 90 UG/1
2 AEROSOL, METERED RESPIRATORY (INHALATION) EVERY 6 HOURS PRN
Qty: 1 INHALER | Refills: 2 | Status: SHIPPED | OUTPATIENT
Start: 2019-11-02 | End: 2022-02-01 | Stop reason: SDUPTHER

## 2019-11-02 RX ORDER — METHYLPREDNISOLONE 4 MG/1
TABLET ORAL
Qty: 1 KIT | Refills: 0 | Status: SHIPPED | OUTPATIENT
Start: 2019-11-02 | End: 2019-12-11 | Stop reason: ALTCHOICE

## 2019-11-05 LAB
CYTOLOGY REPORT: NORMAL
HPV SAMPLE: NORMAL
HPV, GENOTYPE 16: NOT DETECTED
HPV, GENOTYPE 18: NOT DETECTED
HPV, HIGH RISK OTHER: NOT DETECTED
HPV, INTERPRETATION: NORMAL
SPECIMEN DESCRIPTION: NORMAL

## 2019-12-11 ENCOUNTER — OFFICE VISIT (OUTPATIENT)
Dept: PRIMARY CARE CLINIC | Age: 44
End: 2019-12-11
Payer: COMMERCIAL

## 2019-12-11 ENCOUNTER — HOSPITAL ENCOUNTER (OUTPATIENT)
Age: 44
Setting detail: SPECIMEN
Discharge: HOME | End: 2019-12-11
Payer: COMMERCIAL

## 2019-12-11 VITALS
HEART RATE: 89 BPM | WEIGHT: 194.8 LBS | TEMPERATURE: 97.8 F | RESPIRATION RATE: 18 BRPM | DIASTOLIC BLOOD PRESSURE: 95 MMHG | BODY MASS INDEX: 31.44 KG/M2 | OXYGEN SATURATION: 98 % | SYSTOLIC BLOOD PRESSURE: 138 MMHG

## 2019-12-11 DIAGNOSIS — J03.90 EXUDATIVE TONSILLITIS: Primary | ICD-10-CM

## 2019-12-11 DIAGNOSIS — J03.90 EXUDATIVE TONSILLITIS: ICD-10-CM

## 2019-12-11 DIAGNOSIS — J02.9 SORE THROAT: ICD-10-CM

## 2019-12-11 PROCEDURE — 87651 STREP A DNA AMP PROBE: CPT

## 2019-12-11 PROCEDURE — 99213 OFFICE O/P EST LOW 20 MIN: CPT | Performed by: NURSE PRACTITIONER

## 2019-12-11 RX ORDER — AMOXICILLIN 500 MG/1
500 CAPSULE ORAL 2 TIMES DAILY
Qty: 20 CAPSULE | Refills: 0 | Status: SHIPPED | OUTPATIENT
Start: 2019-12-11 | End: 2019-12-21

## 2019-12-11 ASSESSMENT — ENCOUNTER SYMPTOMS
NAUSEA: 0
SWOLLEN GLANDS: 1
SORE THROAT: 1
VISUAL CHANGE: 0
ABDOMINAL PAIN: 0
COUGH: 0
VOMITING: 0
CHANGE IN BOWEL HABIT: 0

## 2019-12-12 ENCOUNTER — TELEPHONE (OUTPATIENT)
Dept: PRIMARY CARE CLINIC | Age: 44
End: 2019-12-12

## 2019-12-12 DIAGNOSIS — J02.9 SORE THROAT: Primary | ICD-10-CM

## 2019-12-12 LAB
DIRECT EXAM: NORMAL
Lab: NORMAL
S PYO AG THROAT QL: NORMAL
SPECIMEN DESCRIPTION: NORMAL

## 2019-12-12 PROCEDURE — 87880 STREP A ASSAY W/OPTIC: CPT | Performed by: NURSE PRACTITIONER

## 2019-12-13 ENCOUNTER — PATIENT MESSAGE (OUTPATIENT)
Dept: PRIMARY CARE CLINIC | Age: 44
End: 2019-12-13

## 2019-12-13 ENCOUNTER — HOSPITAL ENCOUNTER (OUTPATIENT)
Age: 44
Discharge: HOME | End: 2019-12-13
Payer: COMMERCIAL

## 2019-12-13 DIAGNOSIS — J03.90 EXUDATIVE TONSILLITIS: ICD-10-CM

## 2019-12-13 DIAGNOSIS — J02.9 SORE THROAT: ICD-10-CM

## 2019-12-13 DIAGNOSIS — J02.9 SORE THROAT: Primary | ICD-10-CM

## 2019-12-13 LAB — MONONUCLEOSIS SCREEN: NEGATIVE

## 2019-12-13 PROCEDURE — 36415 COLL VENOUS BLD VENIPUNCTURE: CPT

## 2019-12-13 PROCEDURE — 86308 HETEROPHILE ANTIBODY SCREEN: CPT

## 2019-12-13 RX ORDER — PREDNISONE 20 MG/1
TABLET ORAL
Qty: 7 TABLET | Refills: 0 | Status: SHIPPED | OUTPATIENT
Start: 2019-12-13 | End: 2019-12-19

## 2020-03-04 ENCOUNTER — HOSPITAL ENCOUNTER (OUTPATIENT)
Age: 45
Setting detail: SPECIMEN
Discharge: HOME | End: 2020-03-04
Payer: COMMERCIAL

## 2020-03-04 ENCOUNTER — EMPLOYEE WELLNESS (OUTPATIENT)
Dept: OTHER | Age: 45
End: 2020-03-04

## 2020-03-04 DIAGNOSIS — E78.49 OTHER HYPERLIPIDEMIA: ICD-10-CM

## 2020-03-04 LAB
ALT SERPL-CCNC: 53 U/L (ref 5–33)
ANION GAP SERPL CALCULATED.3IONS-SCNC: 16 MMOL/L (ref 9–17)
AST SERPL-CCNC: 35 U/L
BUN BLDV-MCNC: 13 MG/DL (ref 6–20)
BUN/CREAT BLD: 24 (ref 9–20)
CALCIUM SERPL-MCNC: 10.3 MG/DL (ref 8.6–10.4)
CHLORIDE BLD-SCNC: 98 MMOL/L (ref 98–107)
CO2: 23 MMOL/L (ref 20–31)
CREAT SERPL-MCNC: 0.54 MG/DL (ref 0.5–0.9)
ESTIMATED AVERAGE GLUCOSE: 117 MG/DL
ESTIMATED AVERAGE GLUCOSE: 117 MG/DL
GFR AFRICAN AMERICAN: >60 ML/MIN
GFR NON-AFRICAN AMERICAN: >60 ML/MIN
GFR SERPL CREATININE-BSD FRML MDRD: ABNORMAL ML/MIN/{1.73_M2}
GFR SERPL CREATININE-BSD FRML MDRD: ABNORMAL ML/MIN/{1.73_M2}
GLUCOSE BLD-MCNC: 132 MG/DL (ref 70–99)
HBA1C MFR BLD: 5.7 % (ref 4.8–5.9)
HBA1C MFR BLD: 5.7 % (ref 4.8–5.9)
HCT VFR BLD CALC: 44.3 % (ref 36.3–47.1)
HEMOGLOBIN: 14.9 G/DL (ref 11.9–15.1)
HIGH SENSITIVE C-REACTIVE PROTEIN: 2.9 MG/L
MCH RBC QN AUTO: 32.5 PG (ref 25.2–33.5)
MCHC RBC AUTO-ENTMCNC: 33.6 G/DL (ref 28.4–34.8)
MCV RBC AUTO: 96.7 FL (ref 82.6–102.9)
NRBC AUTOMATED: 0 PER 100 WBC
PDW BLD-RTO: 12 % (ref 11.8–14.4)
PLATELET # BLD: 216 K/UL (ref 138–453)
PMV BLD AUTO: 10.2 FL (ref 8.1–13.5)
POTASSIUM SERPL-SCNC: 4.3 MMOL/L (ref 3.7–5.3)
RBC # BLD: 4.58 M/UL (ref 3.95–5.11)
SEDIMENTATION RATE, ERYTHROCYTE: 5 MM (ref 0–20)
SODIUM BLD-SCNC: 137 MMOL/L (ref 135–144)
WBC # BLD: 6.5 K/UL (ref 3.5–11.3)

## 2020-03-04 PROCEDURE — 86141 C-REACTIVE PROTEIN HS: CPT

## 2020-03-04 PROCEDURE — 85027 COMPLETE CBC AUTOMATED: CPT

## 2020-03-04 PROCEDURE — 83036 HEMOGLOBIN GLYCOSYLATED A1C: CPT

## 2020-03-04 PROCEDURE — 80048 BASIC METABOLIC PNL TOTAL CA: CPT

## 2020-03-04 PROCEDURE — 84460 ALANINE AMINO (ALT) (SGPT): CPT

## 2020-03-04 PROCEDURE — 84450 TRANSFERASE (AST) (SGOT): CPT

## 2020-03-04 PROCEDURE — 85651 RBC SED RATE NONAUTOMATED: CPT

## 2020-03-06 LAB
3-OH-COTININE: <2 NG/ML
CHOLESTEROL/HDL RATIO: 4.7
CHOLESTEROL: 242 MG/DL
COTININE: <2 NG/ML
GLUCOSE BLD-MCNC: 132 MG/DL (ref 70–99)
HDLC SERPL-MCNC: 51 MG/DL
LDL CHOLESTEROL: 118 MG/DL (ref 0–130)
NICOTINE: <2 NG/ML
PATIENT FASTING?: YES
TRIGL SERPL-MCNC: 367 MG/DL
VLDLC SERPL CALC-MCNC: ABNORMAL MG/DL (ref 1–30)

## 2020-03-09 ENCOUNTER — OFFICE VISIT (OUTPATIENT)
Dept: FAMILY MEDICINE CLINIC | Age: 45
End: 2020-03-09
Payer: COMMERCIAL

## 2020-03-09 VITALS
BODY MASS INDEX: 32.08 KG/M2 | WEIGHT: 199.6 LBS | SYSTOLIC BLOOD PRESSURE: 138 MMHG | DIASTOLIC BLOOD PRESSURE: 86 MMHG | HEIGHT: 66 IN

## 2020-03-09 PROBLEM — J31.0 CHRONIC RHINITIS: Status: ACTIVE | Noted: 2020-03-09

## 2020-03-09 PROBLEM — H69.81 DYSFUNCTION OF RIGHT EUSTACHIAN TUBE: Status: ACTIVE | Noted: 2020-03-09

## 2020-03-09 PROBLEM — H69.91 DYSFUNCTION OF RIGHT EUSTACHIAN TUBE: Status: ACTIVE | Noted: 2020-03-09

## 2020-03-09 PROCEDURE — 92567 TYMPANOMETRY: CPT | Performed by: FAMILY MEDICINE

## 2020-03-09 PROCEDURE — 99214 OFFICE O/P EST MOD 30 MIN: CPT | Performed by: FAMILY MEDICINE

## 2020-03-09 RX ORDER — SERTRALINE HYDROCHLORIDE 25 MG/1
TABLET, FILM COATED ORAL
Qty: 90 TABLET | Refills: 1 | Status: SHIPPED | OUTPATIENT
Start: 2020-03-09 | End: 2020-07-30 | Stop reason: SDUPTHER

## 2020-03-09 RX ORDER — EZETIMIBE 10 MG/1
10 TABLET ORAL DAILY
Qty: 90 TABLET | Refills: 3 | Status: SHIPPED | OUTPATIENT
Start: 2020-03-09 | End: 2020-07-30 | Stop reason: SDUPTHER

## 2020-03-09 RX ORDER — METFORMIN HYDROCHLORIDE 500 MG/1
500 TABLET, EXTENDED RELEASE ORAL
Qty: 90 TABLET | Refills: 1 | Status: SHIPPED | OUTPATIENT
Start: 2020-03-09 | End: 2020-07-30 | Stop reason: SDUPTHER

## 2020-03-09 ASSESSMENT — PATIENT HEALTH QUESTIONNAIRE - PHQ9
SUM OF ALL RESPONSES TO PHQ QUESTIONS 1-9: 0
1. LITTLE INTEREST OR PLEASURE IN DOING THINGS: 0
SUM OF ALL RESPONSES TO PHQ QUESTIONS 1-9: 0
2. FEELING DOWN, DEPRESSED OR HOPELESS: 0
SUM OF ALL RESPONSES TO PHQ9 QUESTIONS 1 & 2: 0

## 2020-03-09 NOTE — PROGRESS NOTES
throat. Denies ear pain and pressure. Admits white areas on tonsils, occasionally painful to swallow, since December 2019. Concerned these are tonsil stones. Admits nasal drainage. Admits sinus pressure  Respiratory: Denies cough. Denies shortness of breath. Denies wheezing. Cardiovascular: Denies chest pain at rest. Denies irregular heartbeat. Denies palpitations. Gastrointestinal: Denies abdominal pain. Denies blood in the stool. Denies constipation. Denies diarrhea. Denies nausea. Denies vomiting. Genitourinary: Denies blood in the urine. Denies difficulty urinating. Denies frequent urination. Denies painful urination. Denies urinary incontinence. Musculoskeletal: Denies muscle aches. Denies painful joints. Denies swollen joints. Peripheral Vascular: Denies pain/cramping in legs after exertion. Skin: Denies dry skin. Denies itching. Denies rash. Neurologic: Denies falls. Denies dizziness. Denies fainting. Denies tingling/numbness. Psychiatric: Denies sleep disturbance. Denies anxiety. Denies depressed mood.     Past Surgical History:   Procedure Laterality Date    CYSTOURETHROSCOPY  5/2014    Dr. Mary Moreau  5-13-14 [facility]    Hysteroscopy novasure ablation anterior repair and urethral sling, Dr. Lexine Schaumann EXTRACTION         Family History   Problem Relation Age of Onset    Heart Disease Father 48    High Blood Pressure Father     High Cholesterol Father     Hypertension Mother     Diabetes Mother     Heart Disease Paternal Grandfather     Cancer Paternal Grandmother     Heart Disease Maternal Grandmother     Breast Cancer Maternal Grandmother     Heart Surgery Maternal Grandfather     Heart Disease Maternal Grandfather     High Blood Pressure Sister        Past Medical History:   Diagnosis Date    Allergic rhinitis 1995    Asthma 2011    Elevated liver enzymes 2011    Fatty liver disease, nonalcoholic 4/43/3255    03/09/20 138/86   12/11/19 (!) 138/95   10/31/19 138/84     General Appearance: in no acute distress, well developed, well nourished. Eyes: pupils equal, round reactive to light and accommodation. Ears: normal canal and TM's. Nose: nares patent, no lesions. Oral Cavity: mucosa moist.  Throat: diminutive tonsil, exudate glandular secretion, posterior pharynx small  Neck/Thyroid: neck supple, full range of motion, no cervical lymphadenopathy, no thyromegaly or carotid bruits. No significant nodes  Skin: warm and dry. No suspicious lesions. Heart: regular rate and rhythm. No murmurs. S1, S2 normal, no gallops. Rate 80  Lungs: clear to auscultation bilaterally. Abdomen: bowel sounds present, soft, nontender, nondistended, no masses or organomegaly. Musculoskeletal: normal, full range of motion in knees and hips, no swelling or tenderness. Extremities: no cyanosis or edema. Peripheral Pulses: 2+ throughout, symetric. Neurologic: nonfocal, motor strength normal upper and lower extremities, sensory exam intact. Psych: normal affect, speech fluent. ASSESSMENT:   Diagnosis Orders   1. Fatty liver disease, nonalcoholic     2. Hyperglycemia  Hemoglobin A1C   3. Hyperlipidemia, unspecified hyperlipidemia type  Comprehensive Metabolic Panel    Lipid Panel   4. Chronic rhinitis     5. Dysfunction of right eustachian tube  Tympanometry       PLAN:  Labs reviewed. Her liver enzymes are elevated, US of her liver from 6/2018 showed a fatty liver. Her fasting glucose is 132 and her A1C is 5.7. I discuss the benefits of taking Metformin to help reduce the risk of developing diabetes. I do not see anything specific on exam today, her throat is her worst concern. I offer a referral to Dr. Bernard Shane for further evaluation, but she refuses at this time. I suspect this is post viral. She can try using Flonase and Zyrtec daily for 1 month and then will see her back.      I will get a tympanometry today d/t the decreased hearing. Orders Placed This Encounter   Procedures    Comprehensive Metabolic Panel     Standing Status:   Future     Standing Expiration Date:   3/9/2021    Hemoglobin A1C     Standing Status:   Future     Standing Expiration Date:   3/9/2021    Lipid Panel     Standing Status:   Future     Standing Expiration Date:   3/9/2021     Order Specific Question:   Is Patient Fasting?/# of Hours     Answer:   no fasting    Tympanometry     Orders Placed This Encounter   Medications    sertraline (ZOLOFT) 25 MG tablet     Sig: TAKE 1 TABLET BY MOUTH DAILY     Dispense:  90 tablet     Refill:  1    ezetimibe (ZETIA) 10 MG tablet     Sig: Take 1 tablet by mouth daily     Dispense:  90 tablet     Refill:  3    metFORMIN (GLUCOPHAGE-XR) 500 MG extended release tablet     Sig: Take 1 tablet by mouth daily (with breakfast)     Dispense:  90 tablet     Refill:  1   I, Dr. Rufus Neal, personally performed the services described in this documentation as scribed by CRISTINE Godwin in my presence, and is both accurate and complete.

## 2020-03-09 NOTE — PATIENT INSTRUCTIONS
PLAN:  Labs reviewed. Her liver enzymes are elevated, US of her liver from 6/2018 showed a fatty liver. Her fasting glucose is 132 and her A1C is 5.7. I discuss the benefits of taking Metformin to help reduce the risk of developing diabetes. I do not see anything specific on exam today, her throat is her worst concern. I offer a referral to Dr. Moises Bruce for further evaluation, but she refuses at this time. I suspect this is post viral. She can try using Flonase and Zyrtec daily for 1 month and then will see her back. I will get a tympanometry today d/t the decreased hearing. SURVEY:    You may be receiving a survey from [employer] regarding your visit today. Please complete the survey to enable us to provide the highest quality of care to you and your family. If you cannot score us a very good on any question, please call the office to discuss how we could have made your experience a very good one. Thank you.

## 2020-04-30 ENCOUNTER — HOSPITAL ENCOUNTER (OUTPATIENT)
Age: 45
Setting detail: SPECIMEN
Discharge: HOME | End: 2020-04-30
Payer: COMMERCIAL

## 2020-04-30 PROCEDURE — U0003 INFECTIOUS AGENT DETECTION BY NUCLEIC ACID (DNA OR RNA); SEVERE ACUTE RESPIRATORY SYNDROME CORONAVIRUS 2 (SARS-COV-2) (CORONAVIRUS DISEASE [COVID-19]), AMPLIFIED PROBE TECHNIQUE, MAKING USE OF HIGH THROUGHPUT TECHNOLOGIES AS DESCRIBED BY CMS-2020-01-R: HCPCS

## 2020-05-03 LAB — SARS-COV-2, NAA: NOT DETECTED

## 2020-05-04 ENCOUNTER — TELEPHONE (OUTPATIENT)
Dept: PRIMARY CARE CLINIC | Age: 45
End: 2020-05-04

## 2020-05-11 ENCOUNTER — TELEPHONE (OUTPATIENT)
Dept: PRIMARY CARE CLINIC | Age: 45
End: 2020-05-11

## 2020-07-30 RX ORDER — EZETIMIBE 10 MG/1
10 TABLET ORAL DAILY
Qty: 90 TABLET | Refills: 3 | Status: SHIPPED | OUTPATIENT
Start: 2020-07-30 | End: 2020-10-27 | Stop reason: SDUPTHER

## 2020-07-30 RX ORDER — SERTRALINE HYDROCHLORIDE 25 MG/1
TABLET, FILM COATED ORAL
Qty: 90 TABLET | Refills: 1 | Status: SHIPPED | OUTPATIENT
Start: 2020-07-30 | End: 2020-10-27 | Stop reason: SDUPTHER

## 2020-07-30 RX ORDER — METFORMIN HYDROCHLORIDE 500 MG/1
500 TABLET, EXTENDED RELEASE ORAL
Qty: 90 TABLET | Refills: 1 | Status: SHIPPED
Start: 2020-07-30 | End: 2020-10-27 | Stop reason: SINTOL

## 2020-08-27 RX ORDER — AMOXICILLIN 500 MG/1
500 CAPSULE ORAL 3 TIMES DAILY
Qty: 21 CAPSULE | Refills: 0 | Status: SHIPPED | OUTPATIENT
Start: 2020-08-27 | End: 2020-09-03

## 2020-08-27 RX ORDER — METHYLPREDNISOLONE 4 MG/1
TABLET ORAL
Qty: 1 KIT | Refills: 0 | Status: SHIPPED | OUTPATIENT
Start: 2020-08-27 | End: 2020-10-03 | Stop reason: ALTCHOICE

## 2020-10-03 ENCOUNTER — HOSPITAL ENCOUNTER (EMERGENCY)
Age: 45
Discharge: HOME | End: 2020-10-03
Attending: EMERGENCY MEDICINE
Payer: COMMERCIAL

## 2020-10-03 VITALS
BODY MASS INDEX: 32.14 KG/M2 | TEMPERATURE: 98.2 F | SYSTOLIC BLOOD PRESSURE: 149 MMHG | HEIGHT: 66 IN | OXYGEN SATURATION: 95 % | DIASTOLIC BLOOD PRESSURE: 96 MMHG | RESPIRATION RATE: 16 BRPM | WEIGHT: 200 LBS | HEART RATE: 96 BPM
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
## 2020-10-03 LAB
HEPATITIS B SURFACE ANTIGEN: NONREACTIVE
HEPATITIS C ANTIBODY: NONREACTIVE
RAPID HIV 1&2: NONREACTIVE

## 2020-10-03 PROCEDURE — 6360000002 HC RX W HCPCS: Performed by: EMERGENCY MEDICINE

## 2020-10-03 PROCEDURE — 36415 COLL VENOUS BLD VENIPUNCTURE: CPT

## 2020-10-03 PROCEDURE — 86803 HEPATITIS C AB TEST: CPT

## 2020-10-03 PROCEDURE — 90715 TDAP VACCINE 7 YRS/> IM: CPT | Performed by: EMERGENCY MEDICINE

## 2020-10-03 PROCEDURE — 99283 EMERGENCY DEPT VISIT LOW MDM: CPT

## 2020-10-03 PROCEDURE — 90471 IMMUNIZATION ADMIN: CPT | Performed by: EMERGENCY MEDICINE

## 2020-10-03 PROCEDURE — 86707 HEPATITIS BE ANTIBODY: CPT

## 2020-10-03 PROCEDURE — 86703 HIV-1/HIV-2 1 RESULT ANTBDY: CPT

## 2020-10-03 PROCEDURE — 99282 EMERGENCY DEPT VISIT SF MDM: CPT

## 2020-10-03 PROCEDURE — 87340 HEPATITIS B SURFACE AG IA: CPT

## 2020-10-03 RX ADMIN — TETANUS TOXOID, REDUCED DIPHTHERIA TOXOID AND ACELLULAR PERTUSSIS VACCINE, ADSORBED 0.5 ML: 5; 2.5; 8; 8; 2.5 SUSPENSION INTRAMUSCULAR at 14:00

## 2020-10-03 NOTE — ED PROVIDER NOTES
[facility]  EMERGENCY DEPARTMENT ENCOUNTER      Pt Name:Li De Dios  MRN: [id number]  Birthdate 1975  Date of evaluation: 10/3/2020  Provider: Nikole Richardson MD    CHIEF COMPLAINT     Chief Complaint   Patient presents with    Body Fluid Exposure     stuck by used sharp after patient delivery @ 1200         HISTORY OF PRESENT ILLNESS   (Location/Symptom, Timing/Onset, Context/Setting, Quality, Duration, Modifying Factors, Severity)  Note limiting factors. HPI the patient is a nurse in labor and delivery. She was cleaning up after delivery and accidentally stuck her left middle finger with a needle. The appropriate test have been done on the patient and the patient that she was taking care of. Nursing Notes were reviewed. REVIEW OF SYSTEMS    (2-9 systems for level 4, 10 or more for level 5)     Review of Systems   Constitutional: Negative for activity change. Neurological: Negative for numbness. Psychiatric/Behavioral: Negative for confusion and decreased concentration.               MEDICAL HISTORY     Past Medical History:   Diagnosis Date    Allergic rhinitis 1995    Asthma 2011    Elevated liver enzymes 2011    Fatty liver disease, nonalcoholic 0/46/6918    Rufus's syndrome     Hyperlipidemia 2011    Hypertension 2011    h/o now controlled    Migraine     Obesity due to excess calories 11/17/2017    PIH (pregnancy induced hypertension) 2008    Pneumonia 2009    h/o    Urinary incontinence          SURGICAL HISTORY       Past Surgical History:   Procedure Laterality Date    CYSTOURETHROSCOPY  5/2014    Dr. Bisi Florian  5-13-14 [facility]    Hysteroscopy novasure ablation anterior repair and urethral sling, Dr. Steen Means       Current Discharge Medication List      CONTINUE these medications which have NOT CHANGED    Details   sertraline (ZOLOFT) 25 MG tablet TAKE 1 TABLET BY MOUTH DAILY  Qty: 90 tablet, Refills: 1      ezetimibe (ZETIA) 10 MG tablet Take 1 tablet by mouth daily  Qty: 90 tablet, Refills: 3      metFORMIN (GLUCOPHAGE-XR) 500 MG extended release tablet Take 1 tablet by mouth daily (with breakfast)  Qty: 90 tablet, Refills: 1      Multiple Vitamins-Minerals (EQ MULTIVITAMINS ADULT GUMMY PO) Take by mouth daily      loratadine (CLARITIN) 10 MG tablet Take 10 mg by mouth as needed       albuterol sulfate HFA (PROAIR HFA) 108 (90 Base) MCG/ACT inhaler Inhale 2 puffs into the lungs every 6 hours as needed for Wheezing  Qty: 1 Inhaler, Refills: 2    Associated Diagnoses: Cough; Mild intermittent asthma with acute exacerbation      Spacer/Aero-Holding Chambers ([facility] ROBBY) NATANAEL 1 Device by Does not apply route daily  Qty: 1 Device, Refills: 0      docusate sodium (COLACE) 100 MG capsule Take 1 capsule by mouth daily as needed for Constipation.   Qty: 30 capsule, Refills: 0             ALLERGIES     Latex    FAMILY HISTORY       Family History   Problem Relation Age of Onset    Heart Disease Father 48    High Blood Pressure Father     High Cholesterol Father     Hypertension Mother     Diabetes Mother     Heart Disease Paternal Grandfather     Cancer Paternal Grandmother     Heart Disease Maternal Grandmother     Breast Cancer Maternal Grandmother     Heart Surgery Maternal Grandfather     Heart Disease Maternal Grandfather     High Blood Pressure Sister           SOCIAL HISTORY       Social History     Socioeconomic History    Marital status:      Spouse name: None    Number of children: None    Years of education: None    Highest education level: None   Occupational History    None   Social Needs    Financial resource strain: None    Food insecurity     Worry: None     Inability: None    Transportation needs     Medical: None     Non-medical: None   Tobacco Use    Smoking status: Former Smoker     Types: Cigarettes  Smokeless tobacco: Never Used    Tobacco comment: socially   Substance and Sexual Activity    Alcohol use: Yes     Alcohol/week: 0.0 standard drinks     Comment: occ    Drug use: No    Sexual activity: Yes     Partners: Male     Comment: ablation    Lifestyle    Physical activity     Days per week: None     Minutes per session: None    Stress: None   Relationships    Social connections     Talks on phone: None     Gets together: None     Attends Taoism service: None     Active member of club or organization: None     Attends meetings of clubs or organizations: None     Relationship status: None    Intimate partner violence     Fear of current or ex partner: None     Emotionally abused: None     Physically abused: None     Forced sexual activity: None   Other Topics Concern    None   Social History Narrative    None       SCREENINGS             PHYSICAL EXAM  (up to 7 for level 4, 8 or more for level 5)     ED Triage Vitals [10/03/20 1334]   BP Temp Temp Source Pulse Resp SpO2 Height Weight   (!) 149/96 98.2 °F (36.8 °C) Oral 96 16 95 % 5' 6\" (1.676 m) 200 lb (90.7 kg)       Physical Exam  Constitutional:       Appearance: Normal appearance. She is obese. Skin:     General: Skin is warm and dry. Comments: Small needlestick in the left middle finger. Neurological:      General: No focal deficit present. Mental Status: She is alert and oriented to person, place, and time. Mental status is at baseline. Psychiatric:         Mood and Affect: Mood normal.         Behavior: Behavior normal.         Thought Content:  Thought content normal.         Judgment: Judgment normal.         DIAGNOSTIC RESULTS     EKG: All EKG's are interpreted by the Emergency Department Physician whoeither signs or Co-signs this chart in the absence of a cardiologist.      RADIOLOGY:   Non-plain film images such as CT, Ultrasound and MRI are read by the radiologist. Plain radiographic images are visualized and preliminarily interpreted by the emergency physician     Interpretation per the Radiologist below, if available at the time of this note:    No orders to display         ED BEDSIDE ULTRASOUND:   Performed by ED Physician - none    LABS:  Labs Reviewed   HEPATITIS B SURFACE ANTIGEN   HEPATITIS B E ANTIBODY   HIV RAPID 1&2   HEPATITIS C ANTIBODY       EMERGENCY DEPARTMENT COURSE and DIFFERENTIAL DIAGNOSIS/MDM:   Vitals:    Vitals:    10/03/20 1334   BP: (!) 149/96   Pulse: 96   Resp: 16   Temp: 98.2 °F (36.8 °C)   TempSrc: Oral   SpO2: 95%   Weight: 200 lb (90.7 kg)   Height: 5' 6\" (1.676 m)           MDM needle stick protocol has been followed. CONSULTS:  None    PROCEDURES:  Unless otherwise noted below, none     Procedures    FINAL IMPRESSION      1.  Needlestick injury accident with exposure to body fluid          DISPOSITION/PLAN   DISPOSITION Decision To Discharge 10/03/2020 01:53:54 PM      PATIENT REFERRED TO:  Chance Brown MD  [address] [place] [id number] [phone]      As needed      DISCHARGE MEDICATIONS:  Current Discharge Medication List                 (Please note that portions ofthis note were completed with a voice recognition program.  Efforts were made to edit the dictations but occasionally words are mis-transcribed.)      Cristóbal Matson MD (electronically signed)  Attending Emergency Physician          Yumiko Garcia MD  10/03/20 [id number]       Yumiko Garcia MD  10/03/20 [id number]

## 2020-10-05 ENCOUNTER — TELEPHONE (OUTPATIENT)
Dept: FAMILY MEDICINE CLINIC | Age: 45
End: 2020-10-05

## 2020-10-05 NOTE — TELEPHONE ENCOUNTER
VOICEMAIL DOCUMENTATION - ERASE IF NOT USED  Hi, this message is for Fani Restrepo. This is Veryl Antu from [employer] office. Just calling to see how you are doing after your recent visit to the Emergency Room. [employer] wants to make sure you were able to fill any prescriptions and that you understand your discharge instructions. Please return our call if you need to make a follow up appointment with your provider or have any further needs. Our phone number is [phone]. Have a great day.

## 2020-10-07 LAB — HEPATITIS BE ANTIBODY: NEGATIVE

## 2020-10-19 VITALS — WEIGHT: 200 LBS | BODY MASS INDEX: 32.28 KG/M2

## 2020-10-27 ENCOUNTER — OFFICE VISIT (OUTPATIENT)
Dept: FAMILY MEDICINE CLINIC | Age: 45
End: 2020-10-27
Payer: COMMERCIAL

## 2020-10-27 ENCOUNTER — HOSPITAL ENCOUNTER (OUTPATIENT)
Age: 45
Discharge: HOME | End: 2020-10-27
Payer: COMMERCIAL

## 2020-10-27 VITALS
BODY MASS INDEX: 32.62 KG/M2 | DIASTOLIC BLOOD PRESSURE: 84 MMHG | SYSTOLIC BLOOD PRESSURE: 132 MMHG | HEIGHT: 66 IN | WEIGHT: 203 LBS

## 2020-10-27 LAB
ALBUMIN SERPL-MCNC: 4.7 G/DL (ref 3.5–5.2)
ALBUMIN/GLOBULIN RATIO: 1.9 (ref 1–2.5)
ALP BLD-CCNC: 65 U/L (ref 35–104)
ALT SERPL-CCNC: 161 U/L (ref 5–33)
ANION GAP SERPL CALCULATED.3IONS-SCNC: 13 MMOL/L (ref 9–17)
AST SERPL-CCNC: 139 U/L
BILIRUB SERPL-MCNC: 0.58 MG/DL (ref 0.3–1.2)
BUN BLDV-MCNC: 11 MG/DL (ref 6–20)
BUN/CREAT BLD: 25 (ref 9–20)
CALCIUM SERPL-MCNC: 10.2 MG/DL (ref 8.6–10.4)
CHLORIDE BLD-SCNC: 98 MMOL/L (ref 98–107)
CHOLESTEROL/HDL RATIO: 6.1
CHOLESTEROL: 298 MG/DL
CO2: 22 MMOL/L (ref 20–31)
CREAT SERPL-MCNC: 0.44 MG/DL (ref 0.5–0.9)
ESTIMATED AVERAGE GLUCOSE: 151 MG/DL
GFR AFRICAN AMERICAN: >60 ML/MIN
GFR NON-AFRICAN AMERICAN: >60 ML/MIN
GFR SERPL CREATININE-BSD FRML MDRD: ABNORMAL ML/MIN/{1.73_M2}
GFR SERPL CREATININE-BSD FRML MDRD: ABNORMAL ML/MIN/{1.73_M2}
GLUCOSE BLD-MCNC: 131 MG/DL (ref 70–99)
HBA1C MFR BLD: 6.9 % (ref 4–6)
HDLC SERPL-MCNC: 49 MG/DL
LDL CHOLESTEROL: 185 MG/DL (ref 0–130)
POTASSIUM SERPL-SCNC: 4.2 MMOL/L (ref 3.7–5.3)
SODIUM BLD-SCNC: 133 MMOL/L (ref 135–144)
TOTAL PROTEIN: 7.2 G/DL (ref 6.4–8.3)
TRIGL SERPL-MCNC: 319 MG/DL
VLDLC SERPL CALC-MCNC: ABNORMAL MG/DL (ref 1–30)

## 2020-10-27 PROCEDURE — 36415 COLL VENOUS BLD VENIPUNCTURE: CPT

## 2020-10-27 PROCEDURE — 83036 HEMOGLOBIN GLYCOSYLATED A1C: CPT

## 2020-10-27 PROCEDURE — 99214 OFFICE O/P EST MOD 30 MIN: CPT | Performed by: FAMILY MEDICINE

## 2020-10-27 PROCEDURE — 80061 LIPID PANEL: CPT

## 2020-10-27 PROCEDURE — 80053 COMPREHEN METABOLIC PANEL: CPT

## 2020-10-27 RX ORDER — SERTRALINE HYDROCHLORIDE 25 MG/1
TABLET, FILM COATED ORAL
Qty: 90 TABLET | Refills: 3 | Status: SHIPPED | OUTPATIENT
Start: 2020-10-27 | End: 2021-09-28 | Stop reason: DRUGHIGH

## 2020-10-27 RX ORDER — EZETIMIBE 10 MG/1
10 TABLET ORAL DAILY
Qty: 90 TABLET | Refills: 3 | Status: SHIPPED
Start: 2020-10-27 | End: 2021-02-03

## 2020-10-27 RX ORDER — METFORMIN HYDROCHLORIDE 500 MG/1
500 TABLET, EXTENDED RELEASE ORAL
Qty: 90 TABLET | Refills: 3 | Status: CANCELLED | OUTPATIENT
Start: 2020-10-27

## 2020-10-27 RX ORDER — METFORMIN HYDROCHLORIDE 500 MG/1
500 TABLET, FILM COATED, EXTENDED RELEASE ORAL
Qty: 90 TABLET | Refills: 3 | Status: SHIPPED | OUTPATIENT
Start: 2020-10-27 | End: 2022-05-28

## 2020-10-27 NOTE — PROGRESS NOTES
Giacomo Gray, FRAN, am scribing for and in the presence of Dr. Shantelle Caraballo. 10/27/20/4:40pm/SNP    [address] [address]  [place] [address]  Dept: [phone]    Brittany Lovelace is a 39 y.o. female here for 6 Month Follow-Up; Hypertension; and Hyperlipidemia    HPI:  HYPERTENSION  She is not exercising and is adherent to a low carb diet. Blood pressure is being monitored at home. Cardiac symptoms: none. Patient denies: chest pain, dyspnea and palpitations. HYPERLIPIDEMIA   Medication compliance: n/a   Patient is following a low carb diet. She is not exercising regularly. Prior to Admission medications    Medication Sig Start Date End Date Taking? Authorizing Provider   sertraline (ZOLOFT) 25 MG tablet TAKE 1 TABLET BY MOUTH DAILY 10/27/20  Yes Shantelle Caraballo MD   ezetimibe (ZETIA) 10 MG tablet Take 1 tablet by mouth daily 10/27/20  Yes Shantelle Caraballo MD   metFORMIN, MOD, (GLUMETZA) 500 MG extended release tablet Take 1 tablet by mouth daily (with breakfast) DX CODE: R73.9 10/27/20  Yes Shantelle Caraballo MD   albuterol sulfate HFA (PROAIR HFA) 108 (90 Base) MCG/ACT inhaler Inhale 2 puffs into the lungs every 6 hours as needed for Wheezing 11/2/19  Yes DYAN Ingrma - RADHA   Multiple Vitamins-Minerals (EQ MULTIVITAMINS ADULT GUMMY PO) Take by mouth daily   Yes Historical Provider, MD   loratadine (CLARITIN) 10 MG tablet Take 10 mg by mouth as needed    Yes Historical Provider, MD   Spacer/Aero-Holding Chambers [facility]) NATANAEL 1 Device by Does not apply route daily 10/28/15  Yes Shantelle Caraballo MD   docusate sodium (COLACE) 100 MG capsule Take 1 capsule by mouth daily as needed for Constipation. 5/13/14  Yes Gloria Gamble MD     ROS:  General Constitutional: Denies chills. Denies fever. Denies headache. Denies lightheadedness. Ophthalmologic: Denies blurred vision. ENT: Denies nasal congestion. Denies sore throat.  Denies ear pain and pressure. Respiratory: Denies cough. Denies shortness of breath. Denies wheezing. Cardiovascular: Denies chest pain at rest. Denies irregular heartbeat. Denies palpitations. Gastrointestinal: Denies abdominal pain. Denies blood in the stool. Denies constipation. Denies diarrhea. Denies nausea. Denies vomiting. Genitourinary: Denies blood in the urine. Denies difficulty urinating. Denies frequent urination. Denies painful urination. Denies urinary incontinence. Musculoskeletal: Denies muscle aches. Denies painful joints. Denies swollen joints. Peripheral Vascular: Denies pain/cramping in legs after exertion. Skin: Denies dry skin. Denies itching. Denies rash. Neurologic: Denies falls. Denies dizziness. Denies fainting. Denies tingling/numbness. Psychiatric: Denies sleep disturbance. Denies depressed mood. Admits anxiety and stress related to work. She does not get a lunch break or a 10 minute break during a shift of 12+ hours.      Past Surgical History:   Procedure Laterality Date    CYSTOURETHROSCOPY  5/2014    Dr. Ena Ramirez  5-13-14 [facility]    Hysteroscopy novasure ablation anterior repair and urethral sling, Dr. Morin  EXTRACTION       Family History   Problem Relation Age of Onset    Heart Disease Father 48    High Blood Pressure Father     High Cholesterol Father     Hypertension Mother     Diabetes Mother     Heart Disease Paternal Grandfather     Cancer Paternal Grandmother     Heart Disease Maternal Grandmother     Breast Cancer Maternal Grandmother     Heart Surgery Maternal Grandfather     Heart Disease Maternal Grandfather     High Blood Pressure Sister      Past Medical History:   Diagnosis Date    Allergic rhinitis 1995    Asthma 2011    Elevated liver enzymes 2011    Fatty liver disease, nonalcoholic 1/63/7539    Rufus's syndrome     Hyperlipidemia 2011    Hypertension 2011    h/o now controlled  Migraine     Obesity due to excess calories 11/17/2017    PIH (pregnancy induced hypertension) 2008    Pneumonia 2009    h/o    Urinary incontinence       Social History     Tobacco Use    Smoking status: Former Smoker     Types: Cigarettes    Smokeless tobacco: Never Used    Tobacco comment: socially   Substance Use Topics    Alcohol use: Yes     Alcohol/week: 0.0 standard drinks     Comment: occ      Current Outpatient Medications   Medication Sig Dispense Refill    sertraline (ZOLOFT) 25 MG tablet TAKE 1 TABLET BY MOUTH DAILY 90 tablet 3    ezetimibe (ZETIA) 10 MG tablet Take 1 tablet by mouth daily 90 tablet 3    metFORMIN, MOD, (GLUMETZA) 500 MG extended release tablet Take 1 tablet by mouth daily (with breakfast) DX CODE: R73.9 90 tablet 3    albuterol sulfate HFA (PROAIR HFA) 108 (90 Base) MCG/ACT inhaler Inhale 2 puffs into the lungs every 6 hours as needed for Wheezing 1 Inhaler 2    Multiple Vitamins-Minerals (EQ MULTIVITAMINS ADULT GUMMY PO) Take by mouth daily      loratadine (CLARITIN) 10 MG tablet Take 10 mg by mouth as needed       Spacer/Aero-Holding Chambers ([facility] NATANAEL 1 Device by Does not apply route daily 1 Device 0    docusate sodium (COLACE) 100 MG capsule Take 1 capsule by mouth daily as needed for Constipation. 30 capsule 0     No current facility-administered medications for this visit. Allergies   Allergen Reactions    Latex      Redness, swelling, rash     PHYSICAL EXAM:    /84 (Site: Left Upper Arm, Position: Sitting, Cuff Size: Medium Adult)   Ht 5' 6\" (1.676 m)   Wt 203 lb (92.1 kg)   LMP 10/22/2020   BMI 32.77 kg/m²   Wt Readings from Last 3 Encounters:   10/27/20 203 lb (92.1 kg)   10/03/20 200 lb (90.7 kg)   03/09/20 199 lb 9.6 oz (90.5 kg)     BP Readings from Last 3 Encounters:   10/27/20 132/84   10/03/20 (!) 149/96   03/09/20 138/86     General Appearance: in no acute distress, well developed, well nourished.   Eyes: pupils equal, round reactive to light and accommodation. Ears: normal canal and TM's. Nose: nares patent, no lesions. Oral Cavity: mucosa moist.  Throat: clear. Neck/Thyroid: neck supple, full range of motion, no cervical lymphadenopathy, no thyromegaly or carotid bruits. Skin: warm and dry. No suspicious lesions. Heart: regular rate and rhythm. No murmurs. S1, S2 normal, no gallops. Rate 80  Lungs: clear to auscultation bilaterally. Abdomen: bowel sounds present, soft, nontender, nondistended, no masses or organomegaly. Musculoskeletal: normal, full range of motion in knees and hips, no swelling or tenderness. Extremities: no cyanosis or edema. Peripheral Pulses: 2+ throughout, symetric. Neurologic: nonfocal, motor strength normal upper and lower extremities, sensory exam intact. Psych: normal affect, speech fluent. ASSESSMENT:   Diagnosis Orders   1. Type 2 diabetes mellitus without complication, without long-term current use of insulin ([address]     2. Hyperglycemia  metFORMIN, MOD, (GLUMETZA) 500 MG extended release tablet    Ruth Mack, DO, [facility], [place]   3. Hypertension, unspecified type  Waltham Pippins., DO, [facility], [place]   4. Hyperlipidemia, unspecified hyperlipidemia type  ezetimibe (ZETIA) 10 MG tablet    Lipid Panel    Ruth Mack, DO, [facility], [place]   5. Elevated liver enzymes  ALT    AST    Alesha Cheek, DO, [facility], [place]   6. Obesity due to excess calories, unspecified classification, unspecified whether serious comorbidity present  Ruth RALPH, DO, [facility], [place]   7. Fatty liver disease, nonalcoholic  Ruth Mack, DO, [facility], [place]       PLAN:  Labs reviewed with patient, her liver enzymes are elevated again.  She understands what she needs to do to get these levels back WNL. She has not been taking her medications as prescribed. I would like for her to try to make this a priority to taking these regularly. I will switch her Metformin to the modified Metformin to help with the nausea she gets from taking this, this will improve her compliance with taking Metformin and help with her glucose level. We discuss the stress and anxiety that she is going through with work. I also discuss with her bariatric surgery, I will make a referral to Dr. Sandie Zelaya    I will see her back in 3 months with labs prior. Orders Placed This Encounter   Procedures    ALT     Standing Status:   Future     Standing Expiration Date:   10/27/2021    AST     Standing Status:   Future     Standing Expiration Date:   10/27/2021    Lipid Panel     Standing Status:   Future     Standing Expiration Date:   10/27/2021     Order Specific Question:   Is Patient Fasting?/# of Hours     Answer:   Miguel A RALPH DO, [facility], [place]     Referral Priority:   Routine     Referral Type:   Eval and Treat     Referral Reason:   Specialty Services Required     Referred to Provider:   Hue Mullins DO     Requested Specialty:   Bariatric Surgery     Number of Visits Requested:   1     Orders Placed This Encounter   Medications    sertraline (ZOLOFT) 25 MG tablet     Sig: TAKE 1 TABLET BY MOUTH DAILY     Dispense:  90 tablet     Refill:  3    ezetimibe (ZETIA) 10 MG tablet     Sig: Take 1 tablet by mouth daily     Dispense:  90 tablet     Refill:  3    metFORMIN, MOD, (GLUMETZA) 500 MG extended release tablet     Sig: Take 1 tablet by mouth daily (with breakfast) DX CODE: R73.9     Dispense:  90 tablet     Refill:  3   I, Dr. Jay Nathan, personally performed the services described in this documentation as scribed by CRISTINE Winston in my presence, and is both accurate and complete.

## 2020-10-28 ENCOUNTER — TELEPHONE (OUTPATIENT)
Dept: FAMILY MEDICINE CLINIC | Age: 45
End: 2020-10-28

## 2020-10-28 NOTE — RESULT ENCOUNTER NOTE
niotfy A1c is clearly diabetic range now  Consider starting Ozempic or Trulicity for diabetes and weight loss as well as liver

## 2020-10-28 NOTE — TELEPHONE ENCOUNTER
Patient would like to stay on the original Metformin due to insurance not paying for the modified metformin. I offered to send it to a different pharmacy and try to use a discount card and the patient declined.

## 2020-10-29 ENCOUNTER — TELEPHONE (OUTPATIENT)
Dept: FAMILY MEDICINE CLINIC | Age: 45
End: 2020-10-29

## 2020-10-29 RX ORDER — DULAGLUTIDE 0.75 MG/.5ML
0.75 INJECTION, SOLUTION SUBCUTANEOUS WEEKLY
Qty: 2 ML | Refills: 5 | Status: SHIPPED | OUTPATIENT
Start: 2020-10-29 | End: 2021-06-02

## 2020-10-29 NOTE — TELEPHONE ENCOUNTER
----- Message from Melissa Green MD sent at 10/28/2020  5:43 PM EDT -----  niotfy A1c is clearly diabetic range now  Consider starting Ozempic or Trulicity for diabetes and weight loss as well as liver

## 2020-11-06 ENCOUNTER — TELEPHONE (OUTPATIENT)
Dept: BARIATRICS/WEIGHT MGMT | Age: 45
End: 2020-11-06

## 2020-11-06 NOTE — TELEPHONE ENCOUNTER
Online Info Session Completed:  on 11/4 okay     BMI is to low - 32.9 will not qualify for bariatric surgery.

## 2020-11-23 ENCOUNTER — PATIENT MESSAGE (OUTPATIENT)
Dept: FAMILY MEDICINE CLINIC | Age: 45
End: 2020-11-23

## 2020-11-23 NOTE — TELEPHONE ENCOUNTER
From: Mk Melchor  To: Luisa Coleman MD  Sent: 11/23/2020 2:40 PM EST  Subject: Non-Urgent Medical Question    I've spoken to [facility] weight management and bariatric surgery is not covered under insurance unless my BMI is over 35. Can you refer me to a [facility] dietician for a diabetic diet instead? We have not taken that step yet.  The non surgical weight loss program meets once a week in [place] and they don't have a [place] program.

## 2020-11-23 NOTE — TELEPHONE ENCOUNTER
Called and spoke with patient and explained to her that her BMI is to low for surgery. Patient asked appropriate questions. All questions were answered and patient verbalized understanding.

## 2020-11-23 NOTE — TELEPHONE ENCOUNTER
Pt reports that she has 3 comorbid conditions and PCP informed her that it would be covered with those; DM, hyperlipidemia and fatty liver; informed 24-48hr response time

## 2020-12-02 ENCOUNTER — TELEPHONE (OUTPATIENT)
Dept: FAMILY MEDICINE CLINIC | Age: 45
End: 2020-12-02

## 2020-12-02 ENCOUNTER — OFFICE VISIT (OUTPATIENT)
Dept: FAMILY MEDICINE CLINIC | Age: 45
End: 2020-12-02
Payer: COMMERCIAL

## 2020-12-02 VITALS
BODY MASS INDEX: 32.78 KG/M2 | OXYGEN SATURATION: 95 % | TEMPERATURE: 98.9 F | DIASTOLIC BLOOD PRESSURE: 80 MMHG | SYSTOLIC BLOOD PRESSURE: 136 MMHG | HEART RATE: 95 BPM | HEIGHT: 66 IN | WEIGHT: 204 LBS

## 2020-12-02 PROCEDURE — 99213 OFFICE O/P EST LOW 20 MIN: CPT | Performed by: FAMILY MEDICINE

## 2020-12-02 PROCEDURE — 93000 ELECTROCARDIOGRAM COMPLETE: CPT | Performed by: FAMILY MEDICINE

## 2020-12-02 RX ORDER — MULTIVIT-MIN/IRON/FOLIC ACID/K 18-600-40
4000 CAPSULE ORAL DAILY
Qty: 30 CAPSULE | Refills: 0 | Status: SHIPPED | OUTPATIENT
Start: 2020-12-02

## 2020-12-02 RX ORDER — AZITHROMYCIN 250 MG/1
250 TABLET, FILM COATED ORAL SEE ADMIN INSTRUCTIONS
Qty: 6 TABLET | Refills: 0 | Status: SHIPPED | OUTPATIENT
Start: 2020-12-02 | End: 2020-12-07

## 2020-12-02 RX ORDER — ASCORBIC ACID 500 MG
2000 TABLET ORAL 3 TIMES DAILY
Qty: 168 TABLET | Refills: 0 | Status: SHIPPED | OUTPATIENT
Start: 2020-12-02 | End: 2021-05-25 | Stop reason: CLARIF

## 2020-12-02 RX ORDER — IVERMECTIN 3 MG/1
18 TABLET ORAL ONCE
Qty: 12 TABLET | Refills: 0 | Status: SHIPPED | OUTPATIENT
Start: 2020-12-02 | End: 2020-12-02

## 2020-12-02 NOTE — TELEPHONE ENCOUNTER
Pts daughter is positive covid and flu B and Keysha Christelle has chest congestion, dry cough, tired and irregular heartbeat for 2 days, don't know pulse, mild SOB on exertion, lightheaded, no chest pains and had to get tested for work. She is concerned with the heartbeat? ??

## 2020-12-02 NOTE — PROGRESS NOTES
Derik AGUILAR, FRAN, am scribing for and in the presence of Dr. Lashawn Leiva. 12/02/2020 3:58 pm [address]  [address]  [place] [address]  Dept: [phone]    HPI:  Pt. Presents with c/o cough, some SOB with minimal exertion, racing heart 1-teens baseline is 80's and irregular heart beat, primarily with exertion, and scratchy throat. She was tested for Covid at [facility] today but won't have results back until Friday. Dtr. Tested positive today after starting with symptoms on 11/29. Her  started with symptoms yesterday. Current Outpatient Medications   Medication Sig Dispense Refill    ivermectin 3 MG tablet Take 6 tablets by mouth once for 1 dose And then repeat dose again in 2 days.  12 tablet 0    Quercetin 250 MG TABS Take 250 mg by mouth daily 30 tablet 0    Cholecalciferol (VITAMIN D) 50 MCG (2000 UT) CAPS capsule Take 4,000 Units by mouth daily 30 capsule 0    azithromycin (ZITHROMAX) 250 MG tablet Take 1 tablet by mouth See Admin Instructions for 5 days 500mg on day 1 followed by 250mg on days 2 - 5 6 tablet 0    Zinc Gluconate 50 MG CAPS Take 50 mg by mouth daily 14 capsule 0    vitamin C (ASCORBIC ACID) 500 MG tablet Take 4 tablets by mouth 3 times daily 168 tablet 0    Blood Glucose Monitoring Suppl w/Device KIT 1 kit by Does not apply route 2 times daily 1 kit 0    Dulaglutide (TRULICITY) 3.06 CK/8.6CU SOPN Inject 0.75 mg into the skin once a week 2 mL 5    sertraline (ZOLOFT) 25 MG tablet TAKE 1 TABLET BY MOUTH DAILY 90 tablet 3    ezetimibe (ZETIA) 10 MG tablet Take 1 tablet by mouth daily 90 tablet 3    metFORMIN, MOD, (GLUMETZA) 500 MG extended release tablet Take 1 tablet by mouth daily (with breakfast) DX CODE: R73.9 90 tablet 3    Multiple Vitamins-Minerals (EQ MULTIVITAMINS ADULT GUMMY PO) Take by mouth daily      loratadine (CLARITIN) 10 MG tablet Take 10 mg by mouth as needed       Spacer/Aero-Holding Annelise (OPTICHAMBER ROBBY) NATANAEL 1 Device by Does not apply route daily 1 Device 0    docusate sodium (COLACE) 100 MG capsule Take 1 capsule by mouth daily as needed for Constipation. 30 capsule 0    albuterol sulfate HFA (PROAIR HFA) 108 (90 Base) MCG/ACT inhaler Inhale 2 puffs into the lungs every 6 hours as needed for Wheezing (Patient not taking: Reported on 12/2/2020) 1 Inhaler 2     No current facility-administered medications for this visit. ROS:  Admits intermittent lightheadedness. Admits sore throat. Admits irregular heart beat. Admits SOB. Admits racing heart. Admits cough. EXAM:  /80   Pulse 95   Temp 98.9 °F (37.2 °C)   Ht 5' 6\" (1.676 m)   Wt 204 lb (92.5 kg)   SpO2 95%   BMI 32.93 kg/m²   Wt Readings from Last 3 Encounters:   12/02/20 204 lb (92.5 kg)   10/27/20 203 lb (92.1 kg)   10/03/20 200 lb (90.7 kg)     BP Readings from Last 3 Encounters:   12/02/20 136/80   10/27/20 132/84   10/03/20 (!) 149/96     PHYSICAL EXAM:  General Appearance: in no acute distress, well developed, well nourished. Eyes: pupils equal, round reactive to light and accommodation. Ears: normal canal and TM's. Nose: nares patent, no lesions. Oral Cavity: mucosa moist.  Throat: posterior erythema. Neck/Thyroid: neck supple, full range of motion, anterior superior  nodes, sublingual nodes. no thyromegaly or carotid bruits. Skin: warm and dry. No suspicious lesions. Heart: regular rate and rhythm. No murmurs. S1, S2 normal, no gallops. Lungs: clear to auscultation bilaterally. Abdomen: bowel sounds present, soft, nontender, nondistended, no masses or organomegaly. Musculoskeletal: normal, full range of motion in knees and hips, no swelling or tenderness. Extremities: no cyanosis or edema. Peripheral Pulses: 2+ throughout, symetric. Neurologic: nonfocal, motor strength normal upper and lower extremities, sensory exam intact.   Psych: normal affect, speech documentation as scribed by FRAN Barr in my presence, and is both accurate and complete.

## 2020-12-18 ENCOUNTER — TELEPHONE (OUTPATIENT)
Dept: FAMILY MEDICINE CLINIC | Age: 45
End: 2020-12-18

## 2021-02-02 ENCOUNTER — PATIENT MESSAGE (OUTPATIENT)
Dept: FAMILY MEDICINE CLINIC | Age: 46
End: 2021-02-02

## 2021-02-02 DIAGNOSIS — Z00.00 ROUTINE GENERAL MEDICAL EXAMINATION AT A HEALTH CARE FACILITY: Primary | ICD-10-CM

## 2021-02-02 NOTE — TELEPHONE ENCOUNTER
From: Familia Shipman  To: Lisa Pratt MD  Sent: 2/2/2021 7:34 AM EST  Subject: Non-Urgent Medical Question    Is there anyway that I could get the lab cotinine added to my lab work for be well within requirements? I'll be getting my lab work done tomorrow morning for my 3:45 appointment.

## 2021-02-03 ENCOUNTER — OFFICE VISIT (OUTPATIENT)
Dept: FAMILY MEDICINE CLINIC | Age: 46
End: 2021-02-03
Payer: COMMERCIAL

## 2021-02-03 ENCOUNTER — HOSPITAL ENCOUNTER (OUTPATIENT)
Age: 46
Discharge: HOME | End: 2021-02-03
Payer: COMMERCIAL

## 2021-02-03 VITALS
HEIGHT: 66 IN | WEIGHT: 195.2 LBS | BODY MASS INDEX: 31.37 KG/M2 | SYSTOLIC BLOOD PRESSURE: 118 MMHG | DIASTOLIC BLOOD PRESSURE: 70 MMHG

## 2021-02-03 DIAGNOSIS — K76.0 FATTY LIVER DISEASE, NONALCOHOLIC: ICD-10-CM

## 2021-02-03 DIAGNOSIS — E78.5 HYPERLIPIDEMIA, UNSPECIFIED HYPERLIPIDEMIA TYPE: ICD-10-CM

## 2021-02-03 DIAGNOSIS — Z00.00 ROUTINE GENERAL MEDICAL EXAMINATION AT A HEALTH CARE FACILITY: ICD-10-CM

## 2021-02-03 DIAGNOSIS — I10 HYPERTENSION, UNSPECIFIED TYPE: ICD-10-CM

## 2021-02-03 DIAGNOSIS — E66.09 OBESITY DUE TO EXCESS CALORIES, UNSPECIFIED CLASSIFICATION, UNSPECIFIED WHETHER SERIOUS COMORBIDITY PRESENT: ICD-10-CM

## 2021-02-03 DIAGNOSIS — R74.8 ELEVATED LIVER ENZYMES: ICD-10-CM

## 2021-02-03 DIAGNOSIS — R73.9 HYPERGLYCEMIA: ICD-10-CM

## 2021-02-03 DIAGNOSIS — E11.9 TYPE 2 DIABETES MELLITUS WITHOUT COMPLICATION, WITHOUT LONG-TERM CURRENT USE OF INSULIN (HCC): Primary | ICD-10-CM

## 2021-02-03 DIAGNOSIS — L30.9 HAND ECZEMA: ICD-10-CM

## 2021-02-03 LAB
ALT SERPL-CCNC: 81 U/L (ref 5–33)
AST SERPL-CCNC: 52 U/L
CHOLESTEROL/HDL RATIO: 4.6
CHOLESTEROL: 208 MG/DL
HBA1C MFR BLD: 5.8 %
HDLC SERPL-MCNC: 45 MG/DL
LDL CHOLESTEROL: 113 MG/DL (ref 0–130)
TRIGL SERPL-MCNC: 251 MG/DL
VLDLC SERPL CALC-MCNC: ABNORMAL MG/DL (ref 1–30)

## 2021-02-03 PROCEDURE — G0480 DRUG TEST DEF 1-7 CLASSES: HCPCS

## 2021-02-03 PROCEDURE — 83036 HEMOGLOBIN GLYCOSYLATED A1C: CPT | Performed by: FAMILY MEDICINE

## 2021-02-03 PROCEDURE — 36415 COLL VENOUS BLD VENIPUNCTURE: CPT

## 2021-02-03 PROCEDURE — 99214 OFFICE O/P EST MOD 30 MIN: CPT | Performed by: FAMILY MEDICINE

## 2021-02-03 PROCEDURE — 80061 LIPID PANEL: CPT

## 2021-02-03 PROCEDURE — 84460 ALANINE AMINO (ALT) (SGPT): CPT

## 2021-02-03 PROCEDURE — 84450 TRANSFERASE (AST) (SGOT): CPT

## 2021-02-03 RX ORDER — VITAMIN B COMPLEX
1 CAPSULE ORAL DAILY
COMMUNITY
End: 2022-02-01 | Stop reason: CLARIF

## 2021-02-03 RX ORDER — EZETIMIBE 10 MG/1
10 TABLET ORAL DAILY
COMMUNITY
End: 2021-10-08 | Stop reason: SDUPTHER

## 2021-02-03 SDOH — ECONOMIC STABILITY: INCOME INSECURITY: HOW HARD IS IT FOR YOU TO PAY FOR THE VERY BASICS LIKE FOOD, HOUSING, MEDICAL CARE, AND HEATING?: NOT HARD AT ALL

## 2021-02-03 SDOH — ECONOMIC STABILITY: FOOD INSECURITY: WITHIN THE PAST 12 MONTHS, THE FOOD YOU BOUGHT JUST DIDN'T LAST AND YOU DIDN'T HAVE MONEY TO GET MORE.: NEVER TRUE

## 2021-02-03 ASSESSMENT — PATIENT HEALTH QUESTIONNAIRE - PHQ9
SUM OF ALL RESPONSES TO PHQ QUESTIONS 1-9: 0
SUM OF ALL RESPONSES TO PHQ QUESTIONS 1-9: 0
1. LITTLE INTEREST OR PLEASURE IN DOING THINGS: 0
SUM OF ALL RESPONSES TO PHQ QUESTIONS 1-9: 0
2. FEELING DOWN, DEPRESSED OR HOPELESS: 0

## 2021-02-03 NOTE — PROGRESS NOTES
I, Brenda Frost, [facility], am scribing for and in the presence of Dr. Joann Guzman. 2021 4:28 pm Griselda Krik  [address]  [place] [address]  Dept: [phone]    Marlee Banegas is a 39 y.o. female here for 3 Month Follow-Up, Hypertension, and Hyperlipidemia      HPI:  HYPERTENSION  She is exercising (elliptical, walking, weights) and is adherent to a low carb diet. Blood pressure is being monitored at home. Cardiac symptoms: none. Patient denies: chest pain, dyspnea and palpitations.     HYPERLIPIDEMIA   Medication compliance: n/a   Patient is following a low carb diet. She is exercising regularly. DIABETES MELLITUS:  Medication compliance:  compliant all of the time  Diabetic diet compliance:  compliant most of the time  Current exercise: walks   Frequency of testinx daily    Home blood sugar records: started out 130-140 qam and now 110 or 90's am fasting  Any episodes of hypoglycemia? no  Eye exam current (within one year): yes, Tricia Padron. Diabetic foot check in the past year Yes   reports that she has quit smoking. Her smoking use included cigarettes. She has never used smokeless tobacco.         Prior to Admission medications    Medication Sig Start Date End Date Taking?  Authorizing Provider   b complex vitamins capsule Take 1 capsule by mouth daily   Yes Historical Provider, MD   ezetimibe (ZETIA) 10 MG tablet Take 10 mg by mouth daily   Yes Historical Provider, MD   Quercetin 250 MG TABS Take 250 mg by mouth daily 20  Yes Joann Guzman MD   Cholecalciferol (VITAMIN D) 50 MCG (2000 UT) CAPS capsule Take 4,000 Units by mouth daily 20  Yes Joann Guzman MD   Zinc Gluconate 50 MG CAPS Take 50 mg by mouth daily 20  Yes Joann Guzman MD   vitamin C (ASCORBIC ACID) 500 MG tablet Take 4 tablets by mouth 3 times daily 20  Yes Joann Guzman MD   Blood Glucose Monitoring Suppl w/Device KIT 1 kit by Does not apply route 2 times daily 10/30/20  Yes Carolyn Dubin, MD   Dulaglutide (TRULICITY) 7.83 TH/6.2CW SOPN Inject 0.75 mg into the skin once a week 10/29/20  Yes Carolyn Dubin, MD   sertraline (ZOLOFT) 25 MG tablet TAKE 1 TABLET BY MOUTH DAILY 10/27/20  Yes Carolyn Dubin, MD   metFORMIN, MOD, (GLUMETZA) 500 MG extended release tablet Take 1 tablet by mouth daily (with breakfast) DX CODE: R73.9 10/27/20  Yes Carolyn Dubin, MD   Multiple Vitamins-Minerals (EQ MULTIVITAMINS ADULT GUMMY PO) Take by mouth daily   Yes Historical Provider, MD   loratadine (CLARITIN) 10 MG tablet Take 10 mg by mouth as needed    Yes Historical Provider, MD   Spacer/Aero-Holding Chambers (OPTICHAMBER ROBBY) NATANAEL 1 Device by Does not apply route daily 10/28/15  Yes Carolyn Dubin, MD   albuterol sulfate HFA (PROAIR HFA) 108 (90 Base) MCG/ACT inhaler Inhale 2 puffs into the lungs every 6 hours as needed for Wheezing  Patient not taking: Reported on 12/2/2020 11/2/19   Nathan Kurtz APRN - CNM   docusate sodium (COLACE) 100 MG capsule Take 1 capsule by mouth daily as needed for Constipation. Patient not taking: Reported on 2/3/2021 5/13/14   Natalya Vaughn MD       ROS:  General Constitutional: Denies chills. Denies fever. Denies headache. Denies lightheadedness. Ophthalmologic: Denies blurred vision. ENT: Denies nasal congestion. Denies sore throat. Denies ear pain and pressure. Respiratory: Denies cough. Denies shortness of breath. Denies wheezing. Cardiovascular: Denies chest pain at rest. Denies irregular heartbeat. Denies palpitations. Gastrointestinal: Denies abdominal pain. Denies blood in the stool. Denies constipation. Denies diarrhea. Denies nausea. Denies vomiting. Genitourinary: Denies blood in the urine. Denies difficulty urinating. Denies frequent urination. Denies painful urination. Denies urinary incontinence. Admits intermittent twinging pain right ovary region. Has history of cysts. Musculoskeletal: Denies muscle aches.  Denies painful joints. Denies swollen joints. Peripheral Vascular: Denies pain/cramping in legs after exertion. Skin: Admits dry skin. Admits itching admits rash. Neurologic: Denies falls. Denies dizziness. Denies fainting. Denies tingling/numbness. Psychiatric: Denies sleep disturbance. Denies anxiety. Denies depressed mood. Past Surgical History:   Procedure Laterality Date    CYSTOURETHROSCOPY  5/2014    Dr. Linette Mohan  5-13-14 [facility]    Hysteroscopy novasure ablation anterior repair and urethral sling, Dr. Shahab Pino EXTRACTION         Family History   Problem Relation Age of Onset    Heart Disease Father 48    High Blood Pressure Father     High Cholesterol Father     Hypertension Mother     Diabetes Mother     Heart Disease Paternal Grandfather     Cancer Paternal Grandmother     Heart Disease Maternal Grandmother     Breast Cancer Maternal Grandmother     Heart Surgery Maternal Grandfather     Heart Disease Maternal Grandfather     High Blood Pressure Sister        Past Medical History:   Diagnosis Date    Allergic rhinitis 1995    Asthma 2011    Elevated liver enzymes 2011    Fatty liver disease, nonalcoholic 4/80/2484    Rufus's syndrome     Hyperlipidemia 2011    Hypertension 2011    h/o now controlled    Migraine     Obesity due to excess calories 11/17/2017    PIH (pregnancy induced hypertension) 2008    Pneumonia 2009    h/o    Urinary incontinence       Social History     Tobacco Use    Smoking status: Former Smoker     Types: Cigarettes    Smokeless tobacco: Never Used    Tobacco comment: was a social smoker previously   Substance Use Topics    Alcohol use:  Yes     Alcohol/week: 0.0 standard drinks     Comment: occ      Current Outpatient Medications   Medication Sig Dispense Refill    b complex vitamins capsule Take 1 capsule by mouth daily      ezetimibe (ZETIA) 10 MG tablet Take 10 mg by mouth daily  Quercetin 250 MG TABS Take 250 mg by mouth daily 30 tablet 0    Cholecalciferol (VITAMIN D) 50 MCG (2000 UT) CAPS capsule Take 4,000 Units by mouth daily 30 capsule 0    Zinc Gluconate 50 MG CAPS Take 50 mg by mouth daily 14 capsule 0    vitamin C (ASCORBIC ACID) 500 MG tablet Take 4 tablets by mouth 3 times daily 168 tablet 0    Blood Glucose Monitoring Suppl w/Device KIT 1 kit by Does not apply route 2 times daily 1 kit 0    Dulaglutide (TRULICITY) 9.34 VH/6.3DS SOPN Inject 0.75 mg into the skin once a week 2 mL 5    sertraline (ZOLOFT) 25 MG tablet TAKE 1 TABLET BY MOUTH DAILY 90 tablet 3    metFORMIN, MOD, (GLUMETZA) 500 MG extended release tablet Take 1 tablet by mouth daily (with breakfast) DX CODE: R73.9 90 tablet 3    Multiple Vitamins-Minerals (EQ MULTIVITAMINS ADULT GUMMY PO) Take by mouth daily      loratadine (CLARITIN) 10 MG tablet Take 10 mg by mouth as needed       Spacer/Aero-Holding Chambers ([facility] ROBBY) NATANAEL 1 Device by Does not apply route daily 1 Device 0    albuterol sulfate HFA (PROAIR HFA) 108 (90 Base) MCG/ACT inhaler Inhale 2 puffs into the lungs every 6 hours as needed for Wheezing (Patient not taking: Reported on 12/2/2020) 1 Inhaler 2    docusate sodium (COLACE) 100 MG capsule Take 1 capsule by mouth daily as needed for Constipation. (Patient not taking: Reported on 2/3/2021) 30 capsule 0     No current facility-administered medications for this visit. Allergies   Allergen Reactions    Latex      Redness, swelling, rash       PHYSICAL EXAM:    /70   Ht 5' 6\" (1.676 m)   Wt 195 lb 3.2 oz (88.5 kg)   BMI 31.51 kg/m²   Wt Readings from Last 3 Encounters:   02/03/21 195 lb 3.2 oz (88.5 kg)   12/02/20 204 lb (92.5 kg)   10/27/20 203 lb (92.1 kg)     BP Readings from Last 3 Encounters:   02/03/21 118/70   12/02/20 136/80   10/27/20 132/84       General Appearance: in no acute distress, well developed, well nourished.   Eyes: pupils equal, round reactive to light and accommodation. Ears: normal canal and TM's. Nose: nares patent, no lesions. Oral Cavity: mucosa moist.  Throat: clear. Neck/Thyroid: neck supple, full range of motion, no cervical lymphadenopathy, no thyromegaly or carotid bruits. Skin: warm and dry. No suspicious lesions. Heart: regular rate and rhythm. No murmurs. S1, S2 normal, no gallops. Lungs: clear to auscultation bilaterally. Abdomen: bowel sounds present, soft, nontender, nondistended, no masses or organomegaly. Musculoskeletal: normal, full range of motion in knees and hips, no swelling or tenderness. Extremities: no cyanosis or edema. Peripheral Pulses: 2+ throughout, symetric. Neurologic: nonfocal, motor strength normal upper and lower extremities, sensory exam intact. Psych: normal affect, speech fluent. Diabetic foot check: monofilament testing: normal bilaterally                                       Vibratory testing:normal bilaterally      ASSESSMENT:   Diagnosis Orders   1. Type 2 diabetes mellitus without complication, without long-term current use of insulin ([facility])   DIABETES FOOT EXAM    Hemoglobin A1C    POCT glycosylated hemoglobin (Hb A1C)   2. Hypertension, unspecified type  Basic Metabolic Panel    CBC With Auto Differential   3. Hyperlipidemia, unspecified hyperlipidemia type  ALT    AST    Basic Metabolic Panel    CBC With Auto Differential   4. Hyperglycemia  ALT    AST   5. Obesity due to excess calories, unspecified classification, unspecified whether serious comorbidity present     6. Fatty liver disease, nonalcoholic     7. Hand eczema         PLAN:  She saw the dietician and she has been eating healthier and has lost some weight. Dr. Sophie Garcia office told her that she is not a candidate for bariatric surgery. The indications are there with a BMI over 30 with other comorbidities (DM and steatohepatitis)    I will get an in office A1C, today.  If her A1C level is above 6, I would recommend that she increase

## 2021-02-06 LAB
3-OH-COTININE: <2 NG/ML
COTININE: <2 NG/ML
NICOTINE: <2 NG/ML

## 2021-04-05 DIAGNOSIS — E11.9 TYPE 2 DIABETES MELLITUS WITHOUT COMPLICATION, WITHOUT LONG-TERM CURRENT USE OF INSULIN (HCC): Primary | ICD-10-CM

## 2021-04-05 RX ORDER — GLUCOSAMINE HCL/CHONDROITIN SU 500-400 MG
CAPSULE ORAL
Qty: 300 STRIP | Refills: 3 | Status: SHIPPED | OUTPATIENT
Start: 2021-04-05

## 2021-04-05 RX ORDER — METFORMIN HYDROCHLORIDE 500 MG/1
TABLET, EXTENDED RELEASE ORAL
Qty: 90 TABLET | Refills: 1 | Status: SHIPPED | OUTPATIENT
Start: 2021-04-05 | End: 2021-10-08 | Stop reason: SDUPTHER

## 2021-04-07 DIAGNOSIS — E11.9 TYPE 2 DIABETES MELLITUS WITHOUT COMPLICATION, WITHOUT LONG-TERM CURRENT USE OF INSULIN (HCC): Primary | ICD-10-CM

## 2021-04-07 RX ORDER — LANCETS 23 GAUGE
1 EACH MISCELLANEOUS 2 TIMES DAILY
Qty: 200 EACH | Refills: 3 | Status: SHIPPED | OUTPATIENT
Start: 2021-04-07

## 2021-04-26 DIAGNOSIS — N64.4 BREAST PAIN, LEFT: Primary | ICD-10-CM

## 2021-05-03 DIAGNOSIS — N64.4 BREAST PAIN: Primary | ICD-10-CM

## 2021-05-24 ENCOUNTER — HOSPITAL ENCOUNTER (OUTPATIENT)
Age: 46
Discharge: HOME | End: 2021-05-24
Payer: COMMERCIAL

## 2021-05-24 DIAGNOSIS — E11.9 TYPE 2 DIABETES MELLITUS WITHOUT COMPLICATION, WITHOUT LONG-TERM CURRENT USE OF INSULIN (HCC): ICD-10-CM

## 2021-05-24 DIAGNOSIS — E78.5 HYPERLIPIDEMIA, UNSPECIFIED HYPERLIPIDEMIA TYPE: ICD-10-CM

## 2021-05-24 DIAGNOSIS — I10 HYPERTENSION, UNSPECIFIED TYPE: ICD-10-CM

## 2021-05-24 DIAGNOSIS — R73.9 HYPERGLYCEMIA: ICD-10-CM

## 2021-05-24 LAB
ABSOLUTE EOS #: 0.31 K/UL (ref 0–0.44)
ABSOLUTE IMMATURE GRANULOCYTE: <0.03 K/UL (ref 0–0.3)
ABSOLUTE LYMPH #: 2.36 K/UL (ref 1.1–3.7)
ABSOLUTE MONO #: 0.56 K/UL (ref 0.1–1.2)
ALT SERPL-CCNC: 43 U/L (ref 5–33)
ANION GAP SERPL CALCULATED.3IONS-SCNC: 13 MMOL/L (ref 9–17)
AST SERPL-CCNC: 35 U/L
BASOPHILS # BLD: 0 % (ref 0–2)
BASOPHILS ABSOLUTE: <0.03 K/UL (ref 0–0.2)
BUN BLDV-MCNC: 11 MG/DL (ref 6–20)
BUN/CREAT BLD: 19 (ref 9–20)
CALCIUM SERPL-MCNC: 9.8 MG/DL (ref 8.6–10.4)
CHLORIDE BLD-SCNC: 101 MMOL/L (ref 98–107)
CO2: 23 MMOL/L (ref 20–31)
CREAT SERPL-MCNC: 0.57 MG/DL (ref 0.5–0.9)
DIFFERENTIAL TYPE: ABNORMAL
EOSINOPHILS RELATIVE PERCENT: 5 % (ref 1–4)
ESTIMATED AVERAGE GLUCOSE: 105 MG/DL
GFR AFRICAN AMERICAN: >60 ML/MIN
GFR NON-AFRICAN AMERICAN: >60 ML/MIN
GFR SERPL CREATININE-BSD FRML MDRD: NORMAL ML/MIN/{1.73_M2}
GFR SERPL CREATININE-BSD FRML MDRD: NORMAL ML/MIN/{1.73_M2}
GLUCOSE BLD-MCNC: 92 MG/DL (ref 70–99)
HBA1C MFR BLD: 5.3 % (ref 4–6)
HCT VFR BLD CALC: 43.7 % (ref 36.3–47.1)
HEMOGLOBIN: 14.4 G/DL (ref 11.9–15.1)
IMMATURE GRANULOCYTES: 0 %
LYMPHOCYTES # BLD: 36 % (ref 24–43)
MCH RBC QN AUTO: 32.7 PG (ref 25.2–33.5)
MCHC RBC AUTO-ENTMCNC: 33 G/DL (ref 28.4–34.8)
MCV RBC AUTO: 99.1 FL (ref 82.6–102.9)
MONOCYTES # BLD: 8 % (ref 3–12)
NRBC AUTOMATED: 0 PER 100 WBC
PDW BLD-RTO: 12.9 % (ref 11.8–14.4)
PLATELET # BLD: 233 K/UL (ref 138–453)
PLATELET ESTIMATE: ABNORMAL
PMV BLD AUTO: 9.9 FL (ref 8.1–13.5)
POTASSIUM SERPL-SCNC: 4.2 MMOL/L (ref 3.7–5.3)
RBC # BLD: 4.41 M/UL (ref 3.95–5.11)
RBC # BLD: ABNORMAL 10*6/UL
SEG NEUTROPHILS: 51 % (ref 36–65)
SEGMENTED NEUTROPHILS ABSOLUTE COUNT: 3.38 K/UL (ref 1.5–8.1)
SODIUM BLD-SCNC: 137 MMOL/L (ref 135–144)
WBC # BLD: 6.6 K/UL (ref 3.5–11.3)
WBC # BLD: ABNORMAL 10*3/UL

## 2021-05-24 PROCEDURE — 84450 TRANSFERASE (AST) (SGOT): CPT

## 2021-05-24 PROCEDURE — 80048 BASIC METABOLIC PNL TOTAL CA: CPT

## 2021-05-24 PROCEDURE — 83036 HEMOGLOBIN GLYCOSYLATED A1C: CPT

## 2021-05-24 PROCEDURE — 36415 COLL VENOUS BLD VENIPUNCTURE: CPT

## 2021-05-24 PROCEDURE — 85025 COMPLETE CBC W/AUTO DIFF WBC: CPT

## 2021-05-24 PROCEDURE — 84460 ALANINE AMINO (ALT) (SGPT): CPT

## 2021-05-25 ENCOUNTER — OFFICE VISIT (OUTPATIENT)
Dept: FAMILY MEDICINE CLINIC | Age: 46
End: 2021-05-25
Payer: COMMERCIAL

## 2021-05-25 VITALS
SYSTOLIC BLOOD PRESSURE: 132 MMHG | DIASTOLIC BLOOD PRESSURE: 74 MMHG | HEIGHT: 66 IN | OXYGEN SATURATION: 97 % | WEIGHT: 186.8 LBS | BODY MASS INDEX: 30.02 KG/M2

## 2021-05-25 DIAGNOSIS — K76.0 FATTY LIVER DISEASE, NONALCOHOLIC: ICD-10-CM

## 2021-05-25 DIAGNOSIS — E66.09 OBESITY DUE TO EXCESS CALORIES, UNSPECIFIED CLASSIFICATION, UNSPECIFIED WHETHER SERIOUS COMORBIDITY PRESENT: ICD-10-CM

## 2021-05-25 DIAGNOSIS — E78.5 HYPERLIPIDEMIA, UNSPECIFIED HYPERLIPIDEMIA TYPE: ICD-10-CM

## 2021-05-25 DIAGNOSIS — E11.9 TYPE 2 DIABETES MELLITUS WITHOUT COMPLICATION, WITHOUT LONG-TERM CURRENT USE OF INSULIN (HCC): ICD-10-CM

## 2021-05-25 DIAGNOSIS — I10 HYPERTENSION, UNSPECIFIED TYPE: Primary | ICD-10-CM
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
## 2021-05-25 PROBLEM — R74.8 ELEVATED LIVER ENZYMES: Status: RESOLVED | Noted: 2017-11-17 | Resolved: 2021-05-25

## 2021-05-25 PROBLEM — J40 BRONCHITIS: Status: RESOLVED | Noted: 2017-10-20 | Resolved: 2021-05-25

## 2021-05-25 PROBLEM — R73.9 HYPERGLYCEMIA: Status: RESOLVED | Noted: 2017-11-17 | Resolved: 2021-05-25

## 2021-05-25 PROBLEM — H69.91 DYSFUNCTION OF RIGHT EUSTACHIAN TUBE: Status: RESOLVED | Noted: 2020-03-09 | Resolved: 2021-05-25

## 2021-05-25 PROBLEM — Z23 NEED FOR IMMUNIZATION AGAINST INFLUENZA: Status: RESOLVED | Noted: 2017-11-17 | Resolved: 2021-05-25

## 2021-05-25 PROBLEM — J01.90 ACUTE SINUSITIS: Status: RESOLVED | Noted: 2017-02-13 | Resolved: 2021-05-25

## 2021-05-25 PROBLEM — H69.81 DYSFUNCTION OF RIGHT EUSTACHIAN TUBE: Status: RESOLVED | Noted: 2020-03-09 | Resolved: 2021-05-25

## 2021-05-25 PROBLEM — J06.9 UPPER RESPIRATORY TRACT INFECTION: Status: RESOLVED | Noted: 2017-11-17 | Resolved: 2021-05-25

## 2021-05-25 PROBLEM — H66.91 RIGHT OTITIS MEDIA: Status: RESOLVED | Noted: 2019-09-11 | Resolved: 2021-05-25

## 2021-05-25 PROCEDURE — 99214 OFFICE O/P EST MOD 30 MIN: CPT | Performed by: FAMILY MEDICINE

## 2021-05-25 NOTE — PROGRESS NOTES
MD Quiana   Multiple Vitamins-Minerals (EQ MULTIVITAMINS ADULT GUMMY PO) Take by mouth daily   Yes Historical Provider, MD   loratadine (CLARITIN) 10 MG tablet Take 10 mg by mouth as needed    Yes Historical Provider, MD   Spacer/Aero-Holding Chambers (OPTICHAMBER ROBBY) NATANAEL 1 Device by Does not apply route daily 10/28/15  Yes Lisa Bean MD   docusate sodium (COLACE) 100 MG capsule Take 1 capsule by mouth daily as needed for Constipation. 5/13/14  Yes Lorenzo Lewis MD   b complex vitamins capsule Take 1 capsule by mouth daily    Historical Provider, MD   Quercetin 250 MG TABS Take 250 mg by mouth daily  Patient not taking: Reported on 5/25/2021 12/2/20   Lisa Bean MD   metFORMIN, MOD, (GLUMETZA) 500 MG extended release tablet Take 1 tablet by mouth daily (with breakfast) DX CODE: R73.9  Patient not taking: Reported on 5/25/2021 10/27/20   Lisa Bean MD   albuterol sulfate HFA (PROAIR HFA) 108 (90 Base) MCG/ACT inhaler Inhale 2 puffs into the lungs every 6 hours as needed for Wheezing  Patient not taking: Reported on 12/2/2020 11/2/19   DYAN Yen CNM       ROS:  General Constitutional: Denies chills. Denies fever. Denies headache. Denies lightheadedness. Ophthalmologic: Denies blurred vision. ENT: Denies nasal congestion. Denies sore throat. Denies ear pain and pressure. Respiratory: Denies cough. Denies shortness of breath. Denies wheezing. Cardiovascular: Denies chest pain at rest. Denies irregular heartbeat. Admtis palpitations daily , lasting 5-10 seconds   Gastrointestinal: Denies abdominal pain. Denies blood in the stool. Denies constipation. Denies diarrhea. Denies nausea. Denies vomiting. Genitourinary: Denies blood in the urine. Denies difficulty urinating. Denies frequent urination. Denies painful urination. Denies urinary incontinence. Musculoskeletal: Denies muscle aches. Denies painful joints. Denies swollen joints.   Peripheral Vascular: Denies pain/cramping in legs after exertion. Skin: Denies dry skin. Denies itching. Denies rash. Admits mole on back. Neurologic: Denies falls. Denies dizziness. Denies fainting. Denies tingling/numbness. Psychiatric: Denies sleep disturbance. Denies anxiety. Denies depressed mood. Past Surgical History:   Procedure Laterality Date    CYSTOURETHROSCOPY  5/2014    Dr. Holli Taveras  5-13-14 [facility]    Hysteroscopy novasure ablation anterior repair and urethral sling, Dr. Greg Garcia EXTRACTION         Family History   Problem Relation Age of Onset    Heart Disease Father 48    High Blood Pressure Father     High Cholesterol Father     Hypertension Mother     Diabetes Mother     Heart Disease Paternal Grandfather     Cancer Paternal Grandmother     Heart Disease Maternal Grandmother     Breast Cancer Maternal Grandmother     Heart Surgery Maternal Grandfather     Heart Disease Maternal Grandfather     High Blood Pressure Sister        Past Medical History:   Diagnosis Date    Allergic rhinitis 1995    Asthma 2011    Elevated liver enzymes 2011    Fatty liver disease, nonalcoholic 9/37/7303    Rufus's syndrome     Hyperlipidemia 2011    Hypertension 2011    h/o now controlled    Migraine     Obesity due to excess calories 11/17/2017    PIH (pregnancy induced hypertension) 2008    Pneumonia 2009    h/o    Urinary incontinence       Social History     Tobacco Use    Smoking status: Former Smoker     Types: Cigarettes    Smokeless tobacco: Never Used    Tobacco comment: was a social smoker previously   Substance Use Topics    Alcohol use:  Yes     Alcohol/week: 0.0 standard drinks     Comment: occ      Current Outpatient Medications   Medication Sig Dispense Refill    Lancets 28G MISC 1 Device by Does not apply route 2 times daily PRODIGY LANCETS 200 each 3    metFORMIN (GLUCOPHAGE-XR) 500 MG extended release tablet TAKE 1 TABLET BY MOUTH ONCE

## 2021-05-25 NOTE — PATIENT INSTRUCTIONS
PLAN:  She has been on trulicity since 19/9469 and this in conjunction with metformin has improved her A1C from 6.9 to 5.3. When referred she did not qualify for for bariatric surgery since she was not diabetic at that time. Nonetheless, her A1C and weight are now both down. Her liver enzymes have also improved. The lesion on her back is benign. I offer to shave this if she wishes. I will see her back in 4 months. SURVEY:    You may be receiving a survey from [id number] regarding your visit today. Please complete the survey to enable us to provide the highest quality of care to you and your family. If you cannot score us a very good on any question, please call the office to discuss how we could of made your experience a very good one. Thank you.

## 2021-06-02 RX ORDER — DULAGLUTIDE 0.75 MG/.5ML
INJECTION, SOLUTION SUBCUTANEOUS
Qty: 2 PEN | Refills: 3 | Status: SHIPPED | OUTPATIENT
Start: 2021-06-02 | End: 2021-09-20

## 2021-06-02 NOTE — TELEPHONE ENCOUNTER
Feeling of incomplete bladder emptying     Hypertension     Hyperlipidemia     Allergic rhinitis     Asthma     Migraine     Obesity due to excess calories     Fatty liver disease, nonalcoholic     Chronic rhinitis

## 2021-08-03 ENCOUNTER — HOSPITAL ENCOUNTER (OUTPATIENT)
Dept: ULTRASOUND IMAGING | Age: 46
Discharge: HOME | End: 2021-08-05
Payer: COMMERCIAL

## 2021-08-03 ENCOUNTER — HOSPITAL ENCOUNTER (OUTPATIENT)
Dept: WOMENS IMAGING | Age: 46
Discharge: HOME | End: 2021-08-05
Payer: COMMERCIAL

## 2021-08-03 DIAGNOSIS — N64.4 BREAST PAIN: ICD-10-CM

## 2021-08-03 DIAGNOSIS — N64.4 BREAST PAIN, LEFT: ICD-10-CM

## 2021-08-03 PROCEDURE — 76642 ULTRASOUND BREAST LIMITED: CPT

## 2021-08-03 PROCEDURE — G0279 TOMOSYNTHESIS, MAMMO: HCPCS

## 2021-08-13 DIAGNOSIS — J03.80 ACUTE TONSILLITIS DUE TO OTHER SPECIFIED ORGANISMS: Primary | ICD-10-CM

## 2021-08-13 RX ORDER — METHYLPREDNISOLONE 4 MG/1
TABLET ORAL
Qty: 1 KIT | Refills: 0 | Status: SHIPPED | OUTPATIENT
Start: 2021-08-13 | End: 2021-09-28 | Stop reason: ALTCHOICE

## 2021-08-13 RX ORDER — AZITHROMYCIN 250 MG/1
TABLET, FILM COATED ORAL
Qty: 1 PACKET | Refills: 0 | Status: SHIPPED | OUTPATIENT
Start: 2021-08-13 | End: 2021-09-28 | Stop reason: ALTCHOICE

## 2021-08-13 RX ORDER — AZITHROMYCIN 250 MG/1
TABLET, FILM COATED ORAL
Qty: 1 PACKET | Refills: 0 | Status: SHIPPED | OUTPATIENT
Start: 2021-08-13 | End: 2021-08-13 | Stop reason: SDUPTHER

## 2021-08-13 RX ORDER — METHYLPREDNISOLONE 4 MG/1
TABLET ORAL
Qty: 1 KIT | Refills: 0 | Status: SHIPPED | OUTPATIENT
Start: 2021-08-13 | End: 2021-08-13 | Stop reason: SDUPTHER

## 2021-09-20 RX ORDER — DULAGLUTIDE 0.75 MG/.5ML
INJECTION, SOLUTION SUBCUTANEOUS
Qty: 4 PEN | Refills: 3 | Status: SHIPPED | OUTPATIENT
Start: 2021-09-20 | End: 2021-12-10

## 2021-09-27 ENCOUNTER — HOSPITAL ENCOUNTER (OUTPATIENT)
Age: 46
Discharge: HOME | End: 2021-09-27
Payer: COMMERCIAL

## 2021-09-27 DIAGNOSIS — E11.9 TYPE 2 DIABETES MELLITUS WITHOUT COMPLICATION, WITHOUT LONG-TERM CURRENT USE OF INSULIN (HCC): ICD-10-CM

## 2021-09-27 DIAGNOSIS — E78.5 HYPERLIPIDEMIA, UNSPECIFIED HYPERLIPIDEMIA TYPE: ICD-10-CM

## 2021-09-27 DIAGNOSIS — I10 HYPERTENSION, UNSPECIFIED TYPE: ICD-10-CM

## 2021-09-27 LAB
ALT SERPL-CCNC: 34 U/L (ref 5–33)
ANION GAP SERPL CALCULATED.3IONS-SCNC: 14 MMOL/L (ref 9–17)
AST SERPL-CCNC: 30 U/L
BUN BLDV-MCNC: 10 MG/DL (ref 6–20)
BUN/CREAT BLD: 18 (ref 9–20)
CALCIUM SERPL-MCNC: 9.7 MG/DL (ref 8.6–10.4)
CHLORIDE BLD-SCNC: 104 MMOL/L (ref 98–107)
CHOLESTEROL/HDL RATIO: 5
CHOLESTEROL: 245 MG/DL
CO2: 21 MMOL/L (ref 20–31)
CREAT SERPL-MCNC: 0.56 MG/DL (ref 0.5–0.9)
CREATININE URINE: 117.8 MG/DL (ref 28–217)
ESTIMATED AVERAGE GLUCOSE: 105 MG/DL
GFR AFRICAN AMERICAN: >60 ML/MIN
GFR NON-AFRICAN AMERICAN: >60 ML/MIN
GFR SERPL CREATININE-BSD FRML MDRD: ABNORMAL ML/MIN/{1.73_M2}
GFR SERPL CREATININE-BSD FRML MDRD: ABNORMAL ML/MIN/{1.73_M2}
GLUCOSE BLD-MCNC: 100 MG/DL (ref 70–99)
HBA1C MFR BLD: 5.3 % (ref 4–6)
HDLC SERPL-MCNC: 49 MG/DL
LDL CHOLESTEROL: 134 MG/DL (ref 0–130)
MICROALBUMIN/CREAT 24H UR: <12 MG/L
MICROALBUMIN/CREAT UR-RTO: NORMAL MCG/MG CREAT
POTASSIUM SERPL-SCNC: 3.9 MMOL/L (ref 3.7–5.3)
SODIUM BLD-SCNC: 139 MMOL/L (ref 135–144)
TRIGL SERPL-MCNC: 310 MG/DL
VLDLC SERPL CALC-MCNC: ABNORMAL MG/DL (ref 1–30)

## 2021-09-27 PROCEDURE — 80061 LIPID PANEL: CPT

## 2021-09-27 PROCEDURE — 82570 ASSAY OF URINE CREATININE: CPT

## 2021-09-27 PROCEDURE — 82043 UR ALBUMIN QUANTITATIVE: CPT

## 2021-09-27 PROCEDURE — 84450 TRANSFERASE (AST) (SGOT): CPT

## 2021-09-27 PROCEDURE — 83036 HEMOGLOBIN GLYCOSYLATED A1C: CPT

## 2021-09-27 PROCEDURE — 84460 ALANINE AMINO (ALT) (SGPT): CPT

## 2021-09-27 PROCEDURE — 36415 COLL VENOUS BLD VENIPUNCTURE: CPT

## 2021-09-27 PROCEDURE — 80048 BASIC METABOLIC PNL TOTAL CA: CPT

## 2021-09-28 ENCOUNTER — OFFICE VISIT (OUTPATIENT)
Dept: FAMILY MEDICINE CLINIC | Age: 46
End: 2021-09-28
Payer: COMMERCIAL

## 2021-09-28 VITALS
BODY MASS INDEX: 29.73 KG/M2 | DIASTOLIC BLOOD PRESSURE: 86 MMHG | HEIGHT: 66 IN | WEIGHT: 185 LBS | SYSTOLIC BLOOD PRESSURE: 124 MMHG

## 2021-09-28 DIAGNOSIS — K76.0 FATTY LIVER DISEASE, NONALCOHOLIC: ICD-10-CM

## 2021-09-28 DIAGNOSIS — E78.5 HYPERLIPIDEMIA, UNSPECIFIED HYPERLIPIDEMIA TYPE: Primary | ICD-10-CM

## 2021-09-28 DIAGNOSIS — E11.9 TYPE 2 DIABETES MELLITUS WITHOUT COMPLICATION, WITHOUT LONG-TERM CURRENT USE OF INSULIN (HCC): ICD-10-CM

## 2021-09-28 DIAGNOSIS — F41.9 ANXIETY: ICD-10-CM

## 2021-09-28 DIAGNOSIS — I10 HYPERTENSION, UNSPECIFIED TYPE: ICD-10-CM

## 2021-09-28 PROCEDURE — 99214 OFFICE O/P EST MOD 30 MIN: CPT | Performed by: FAMILY MEDICINE

## 2021-09-28 RX ORDER — FAMOTIDINE 20 MG/1
20 TABLET, FILM COATED ORAL DAILY
COMMUNITY

## 2021-09-28 NOTE — PATIENT INSTRUCTIONS
PLAN:  The history listed above was reviewed today and is accurate. We discuss the racing heart and how this is associated to anticipation of work the next day. She is able to bring this under control with deep breathing. She continues on sertraline but at a low dose of 25 mg daily. I recommend that she increase the sertraline to 50 mg daily to see if this is beneficial for the anxiety. We discuss her short sleep latency and how she feels she could sleep at anytime and how this could be indicative of obstructive sleep apnea. She is only getting about 6 hours of sleep/night. This sounds more like sleep deprivation and I encourage her to devote more time to sleep. She continues on the trulicity and her weight also continues to trend downward. I will see her back in 4 months. SURVEY:    You may be receiving a survey from [employer] regarding your visit today. Please complete the survey to enable us to provide the highest quality of care to you and your family. If you cannot score us a very good on any question, please call the office to discuss how we could of made your experience a very good one. Thank you.

## 2021-09-28 NOTE — PROGRESS NOTES
I, FRAN King, am scribing for and in the presence of Dr. Hipolito Samuels. 09/28/2021 9:15 am Griselda 61  [address]  [place] [id number]  Dept: [phone]    Crow Lopes is a 55 y.o. female here for Follow-up (4 month ), Hypertension, and Hyperlipidemia      HPI:  HYPERTENSION  She is not exercising and is adherent to a low carb diet. Blood pressure is not being monitored at home. Cardiac symptoms: none. Patient denies: chest pain, dyspnea and palpitations.     HYPERLIPIDEMIA   Medication compliance: n/a   Patient is following a low carb diet. She is not exercising regularly:    Prior to Admission medications    Medication Sig Start Date End Date Taking? Authorizing Provider   famotidine (PEPCID) 20 MG tablet Take 20 mg by mouth 2 times daily   Yes Historical Provider, MD   sertraline (ZOLOFT) 50 MG tablet Take 1 tablet by mouth daily 9/28/21  Yes Hipolito Samuels MD   TRULICITY 4.16 PL/7.9OR [facility] INJECT THE CONTENTS OF ONE SYRINGE UNDER THE SKIN ONCE WEEKLY 9/20/21  Yes Hipolito Samuels MD   Lancets 28G MISC 1 Device by Does not apply route 2 times daily PRODIGY LANCETS 4/7/21  Yes Hipolito Samuels MD   blood glucose monitor kit and supplies 1 kit by Other route 2 times daily Dispense prodigy meter per insurance coverage 4/5/21  Yes Hipolito Samuels MD   blood glucose monitor strips Test 2 times a day & as needed for symptoms of irregular blood glucose.  Dispense sufficient amount for indicated testing frequency plus additional to accommodate PRN testing needs also, dispense prodigy test strips per insurance coverage 4/5/21  Yes Hipolito Samuels MD   b complex vitamins capsule Take 1 capsule by mouth daily   Yes Historical Provider, MD   ezetimibe (ZETIA) 10 MG tablet Take 10 mg by mouth daily   Yes Historical Provider, MD   Cholecalciferol (VITAMIN D) 50 MCG (2000 UT) CAPS capsule Take 4,000 Units by mouth daily 12/2/20  Yes Hipolito Samuels MD   Multiple Vitamins-Minerals (EQ MULTIVITAMINS ADULT GUMMY PO) Take by mouth daily   Yes Historical Provider, MD   loratadine (CLARITIN) 10 MG tablet Take 10 mg by mouth as needed    Yes Historical Provider, MD   Spacer/Aero-Holding Chambers (OPTICHAMBER ROBBY) NATANAEL 1 Device by Does not apply route daily 10/28/15  Yes Jay Nathan MD   docusate sodium (COLACE) 100 MG capsule Take 1 capsule by mouth daily as needed for Constipation. 5/13/14  Yes Pasha Rob MD   metFORMIN (GLUCOPHAGE-XR) 500 MG extended release tablet TAKE 1 TABLET BY MOUTH ONCE DAILY WITH BREAKFAST 4/5/21   Jay Nathan MD   metFORMIN, MOD, (GLUMETZA) 500 MG extended release tablet Take 1 tablet by mouth daily (with breakfast) DX CODE: R73.9 10/27/20   Jay Nathan MD   albuterol sulfate HFA (PROAIR HFA) 108 (90 Base) MCG/ACT inhaler Inhale 2 puffs into the lungs every 6 hours as needed for Wheezing  Patient not taking: Reported on 12/2/2020 11/2/19   DYAN Hubbard CNM       ROS:  General Constitutional: Denies chills. Denies fever. Denies headache. Denies lightheadedness. Ophthalmologic: Denies blurred vision. ENT: Denies nasal congestion. Denies sore throat. Denies ear pain and pressure. Respiratory: Denies cough. Denies shortness of breath. Denies wheezing. Cardiovascular: Denies chest pain at rest. Denies irregular heartbeat. Denies palpitations. Admits intermittent episodes of tachycardia, occurs multiple times/week, Low 100s and skipped beats, occurs during times of feeling anxious, under a lot of work stress. Gastrointestinal: Denies abdominal pain. Denies blood in the stool. Denies constipation. Denies diarrhea. Denies nausea. Denies vomiting. Genitourinary: Denies blood in the urine. Denies difficulty urinating. Denies frequent urination. Denies painful urination. Denies urinary incontinence. Musculoskeletal: Denies muscle aches. Denies painful joints. Denies swollen joints.   Peripheral Vascular: Denies pain/cramping in legs after exertion. Skin: Denies dry skin. Denies itching. Denies rash. Neurologic: Denies falls. Denies dizziness. Denies fainting. Denies tingling/numbness. Psychiatric: Denies sleep disturbance. Admits anxiety. Denies depressed mood. Past Surgical History:   Procedure Laterality Date    CYSTOURETHROSCOPY  5/2014    Dr. Reyes Signs  5-13-14 [address] [facility]    Hysteroscopy novasure ablation anterior repair and urethral sling, Dr. Maynor Caro EXTRACTION         Family History   Problem Relation Age of Onset    Hypertension Mother     Diabetes Mother     Heart Disease Father 48    High Blood Pressure Father     High Cholesterol Father     High Blood Pressure Sister     Cancer Maternal Grandmother         colon    Heart Disease Maternal Grandmother     Breast Cancer Maternal Grandmother     Heart Surgery Maternal Grandfather     Heart Disease Maternal Grandfather     Cancer Paternal Grandmother     Heart Disease Paternal Grandfather        Past Medical History:   Diagnosis Date    Allergic rhinitis 1995    Asthma 2011    Elevated liver enzymes 2011    Fatty liver disease, nonalcoholic 1/98/0492    Rufus's syndrome     Hyperlipidemia 2011    Hypertension 2011    h/o now controlled    Migraine     Obesity due to excess calories 11/17/2017    PIH (pregnancy induced hypertension) 2008    Pneumonia 2009    h/o    Urinary incontinence       Social History     Tobacco Use    Smoking status: Former Smoker     Types: Cigarettes    Smokeless tobacco: Never Used    Tobacco comment: was a social smoker previously   Substance Use Topics    Alcohol use:  Yes     Alcohol/week: 0.0 standard drinks     Comment: occ      Current Outpatient Medications   Medication Sig Dispense Refill    famotidine (PEPCID) 20 MG tablet Take 20 mg by mouth 2 times daily      sertraline (ZOLOFT) 50 MG tablet Take 1 tablet by mouth daily 90 tablet 3    [facility] SOPN INJECT THE CONTENTS OF ONE SYRINGE UNDER THE SKIN ONCE WEEKLY 4 pen 3    Lancets 28G MISC 1 Device by Does not apply route 2 times daily PRODIGY LANCETS 200 each 3    blood glucose monitor kit and supplies 1 kit by Other route 2 times daily Dispense prodigy meter per insurance coverage 1 kit 0    blood glucose monitor strips Test 2 times a day & as needed for symptoms of irregular blood glucose. Dispense sufficient amount for indicated testing frequency plus additional to accommodate PRN testing needs also, dispense prodigy test strips per insurance coverage 300 strip 3    b complex vitamins capsule Take 1 capsule by mouth daily      ezetimibe (ZETIA) 10 MG tablet Take 10 mg by mouth daily      Cholecalciferol (VITAMIN D) 50 MCG (2000 UT) CAPS capsule Take 4,000 Units by mouth daily 30 capsule 0    Multiple Vitamins-Minerals (EQ MULTIVITAMINS ADULT GUMMY PO) Take by mouth daily      loratadine (CLARITIN) 10 MG tablet Take 10 mg by mouth as needed       Spacer/Aero-Holding Chambers ([facility]) NATANAEL 1 Device by Does not apply route daily 1 Device 0    docusate sodium (COLACE) 100 MG capsule Take 1 capsule by mouth daily as needed for Constipation. 30 capsule 0    metFORMIN (GLUCOPHAGE-XR) 500 MG extended release tablet TAKE 1 TABLET BY MOUTH ONCE DAILY WITH BREAKFAST 90 tablet 1    metFORMIN, MOD, (GLUMETZA) 500 MG extended release tablet Take 1 tablet by mouth daily (with breakfast) DX CODE: R73.9 90 tablet 3    albuterol sulfate HFA (PROAIR HFA) 108 (90 Base) MCG/ACT inhaler Inhale 2 puffs into the lungs every 6 hours as needed for Wheezing (Patient not taking: Reported on 12/2/2020) 1 Inhaler 2     No current facility-administered medications for this visit.      Allergies   Allergen Reactions    Latex      Redness, swelling, rash       PHYSICAL EXAM:    /86   Ht 5' 6\" (1.676 m)   Wt 185 lb (83.9 kg)   BMI 29.86 kg/m²   Wt Readings from Last 3 Encounters: 09/28/21 185 lb (83.9 kg)   05/25/21 186 lb 12.8 oz (84.7 kg)   02/03/21 195 lb 3.2 oz (88.5 kg)     BP Readings from Last 3 Encounters:   09/28/21 124/86   05/25/21 132/74   02/03/21 118/70       General Appearance: in no acute distress, well developed, well nourished. Eyes: pupils equal, round reactive to light and accommodation. Ears: normal canal and TM's. Nose: nares patent, no lesions. Oral Cavity: mucosa moist.  Throat: clear. Neck/Thyroid: neck supple, full range of motion, no cervical lymphadenopathy, no thyromegaly or carotid bruits. Skin: warm and dry. No suspicious lesions. Heart: regular rate and rhythm. No murmurs. S1, S2 normal, no gallops. Rate: 90   Lungs: clear to auscultation bilaterally. Abdomen: bowel sounds present, soft, nontender, nondistended, no masses or organomegaly. Musculoskeletal: normal, full range of motion in knees and hips, no swelling or tenderness. Extremities: no cyanosis or edema. Peripheral Pulses: 2+ throughout, symetric. Neurologic: nonfocal, motor strength normal upper and lower extremities, sensory exam intact. Psych: normal affect, speech fluent. ASSESSMENT:   Diagnosis Orders   1. Hyperlipidemia, unspecified hyperlipidemia type  ALT    AST    Basic Metabolic Panel    Hemoglobin A1C    Lipid Panel   2. Hypertension, unspecified type  ALT    AST    Basic Metabolic Panel    Hemoglobin A1C    Lipid Panel   3. Type 2 diabetes mellitus without complication, without long-term current use of insulin (HCC)  ALT    AST    Basic Metabolic Panel    Hemoglobin A1C    Lipid Panel   4. Fatty liver disease, nonalcoholic     5. Anxiety         PLAN:  The history listed above was reviewed today and is accurate. We discuss the racing heart and how this is associated to anticipation of work the next day. She is able to bring this under control with deep breathing. She continues on sertraline but at a low dose of 25 mg daily.  I recommend that she increase the sertraline to 50 mg daily to see if this is beneficial for the anxiety. We discuss her short sleep latency and how she feels she could sleep at anytime and how this could be indicative of obstructive sleep apnea. She is only getting about 6 hours of sleep/night. This sounds more like sleep deprivation and I encourage her to devote more time to sleep. She continues on the trulicity and her weight also continues to trend downward. I will see her back in 4 months. Orders Placed This Encounter   Procedures    ALT     Standing Status:   Future     Standing Expiration Date:   9/28/2022    AST     Standing Status:   Future     Standing Expiration Date:   9/28/2022    Basic Metabolic Panel     Standing Status:   Future     Standing Expiration Date:   9/28/2022    Hemoglobin A1C     Standing Status:   Future     Standing Expiration Date:   9/28/2022    Lipid Panel     Standing Status:   Future     Standing Expiration Date:   9/28/2022     Order Specific Question:   Is Patient Fasting?/# of Hours     Answer:   none     Orders Placed This Encounter   Medications    sertraline (ZOLOFT) 50 MG tablet     Sig: Take 1 tablet by mouth daily     Dispense:  90 tablet     Refill:  3   I, Dr. Ariel Ivory, personally performed the services described in this documentation as scribed/transcribed by FRAN King in my presence, and is both accurate and complete.

## 2021-10-06 ENCOUNTER — OFFICE VISIT (OUTPATIENT)
Dept: OBGYN | Age: 46
End: 2021-10-06
Payer: COMMERCIAL

## 2021-10-06 VITALS
DIASTOLIC BLOOD PRESSURE: 72 MMHG | HEIGHT: 66 IN | BODY MASS INDEX: 29.73 KG/M2 | SYSTOLIC BLOOD PRESSURE: 110 MMHG | WEIGHT: 185 LBS

## 2021-10-06 DIAGNOSIS — N81.10 CYSTOCELE WITHOUT UTERINE PROLAPSE: ICD-10-CM

## 2021-10-06 DIAGNOSIS — Z01.419 WELL WOMAN EXAM WITH ROUTINE GYNECOLOGICAL EXAM: Primary | ICD-10-CM

## 2021-10-06 PROCEDURE — 99396 PREV VISIT EST AGE 40-64: CPT | Performed by: ADVANCED PRACTICE MIDWIFE

## 2021-10-06 NOTE — PROGRESS NOTES
YEARLY PHYSICAL    Date of service: 10/6/2021    Jaren Townsend  Is a 55 y.o.   female    PT's PCP is: Bryan Bazan MD     : 1975                                             Subjective:       Patient's last menstrual period was 10/01/2021 (exact date).      Are your menses regular: yes    OB History    Para Term  AB Living   3 3 3         SAB TAB Ectopic Molar Multiple Live Births                    # Outcome Date GA Lbr Hudson/2nd Weight Sex Delivery Anes PTL Lv   3 Term  40w0d   M Vag-Spont      2 Term  40w0d   F Vag-Spont      1 Term 12 37w0d   F Vag-Spont           Social History     Tobacco Use   Smoking Status Former Smoker    Types: Cigarettes   Smokeless Tobacco Never Used   Tobacco Comment    was a social smoker previously        Social History     Substance and Sexual Activity   Alcohol Use Yes    Alcohol/week: 0.0 standard drinks    Comment: occ       Family History   Problem Relation Age of Onset    Hypertension Mother     Diabetes Mother     Heart Disease Father 48    High Blood Pressure Father     High Cholesterol Father     High Blood Pressure Sister     Cancer Maternal Grandmother         colon    Heart Disease Maternal Grandmother     Breast Cancer Maternal Grandmother     Heart Surgery Maternal Grandfather     Heart Disease Maternal Grandfather     Cancer Paternal Grandmother     Heart Disease Paternal Grandfather        Any family history of breast or ovarian cancer: Yes, breast cancer     Any family history of blood clots: No    Have you had a positive covid test: Yes    Have you had the covid immunization: Yes      Allergies: Latex      Current Outpatient Medications:     sertraline (ZOLOFT) 50 MG tablet, Take 1 tablet by mouth daily, Disp: 90 tablet, Rfl: 3    TRULICITY 5.48 UL/8.8DE SOPN, INJECT THE CONTENTS OF ONE SYRINGE UNDER THE SKIN ONCE WEEKLY, Disp: 4 pen, Rfl: 3   Lancets 28G MISC, 1 Device by Does not apply route 2 times daily PRODIGY LANCETS, Disp: 200 each, Rfl: 3    metFORMIN (GLUCOPHAGE-XR) 500 MG extended release tablet, TAKE 1 TABLET BY MOUTH ONCE DAILY WITH BREAKFAST, Disp: 90 tablet, Rfl: 1    blood glucose monitor kit and supplies, 1 kit by Other route 2 times daily Dispense prodigy meter per insurance coverage, Disp: 1 kit, Rfl: 0    blood glucose monitor strips, Test 2 times a day & as needed for symptoms of irregular blood glucose. Dispense sufficient amount for indicated testing frequency plus additional to accommodate PRN testing needs also, dispense prodigy test strips per insurance coverage, Disp: 300 strip, Rfl: 3    b complex vitamins capsule, Take 1 capsule by mouth daily, Disp: , Rfl:     ezetimibe (ZETIA) 10 MG tablet, Take 10 mg by mouth daily, Disp: , Rfl:     Cholecalciferol (VITAMIN D) 50 MCG (2000 UT) CAPS capsule, Take 4,000 Units by mouth daily, Disp: 30 capsule, Rfl: 0    Multiple Vitamins-Minerals (EQ MULTIVITAMINS ADULT GUMMY PO), Take by mouth daily, Disp: , Rfl:     loratadine (CLARITIN) 10 MG tablet, Take 10 mg by mouth as needed , Disp: , Rfl:     famotidine (PEPCID) 20 MG tablet, Take 20 mg by mouth 2 times daily (Patient not taking: Reported on 10/6/2021), Disp: , Rfl:     metFORMIN, MOD, (GLUMETZA) 500 MG extended release tablet, Take 1 tablet by mouth daily (with breakfast) DX CODE: R73.9 (Patient not taking: Reported on 10/6/2021), Disp: 90 tablet, Rfl: 3    albuterol sulfate HFA (PROAIR HFA) 108 (90 Base) MCG/ACT inhaler, Inhale 2 puffs into the lungs every 6 hours as needed for Wheezing (Patient not taking: Reported on 12/2/2020), Disp: 1 Inhaler, Rfl: 2    Spacer/Aero-Holding Chambers (OPTICHAMBER ROBBY) NATANAEL, 1 Device by Does not apply route daily (Patient not taking: Reported on 10/6/2021), Disp: 1 Device, Rfl: 0    docusate sodium (COLACE) 100 MG capsule, Take 1 capsule by mouth daily as needed for Constipation. (Patient not taking: Reported on 10/6/2021), Disp: 30 capsule, Rfl: 0    Social History     Substance and Sexual Activity   Sexual Activity Yes    Partners: Male    Comment: ablation        Any bleeding or pain with intercourse: No    Last Yearly:  2019    Last pap: 10/31/19    Last HPV: 10/31/19 neg     Last Mammogram: 08/03/21    Last Dexascan n/a    Last colorectal screen- type:n/a*  date      Do you do self breast exams: Yes    Past Medical History:   Diagnosis Date    Allergic rhinitis 1995    Asthma 2011    Elevated liver enzymes 2011    Fatty liver disease, nonalcoholic 2/70/1456    Rufus's syndrome     Hyperlipidemia 2011    Hypertension 2011    h/o now controlled    Migraine     Obesity due to excess calories 11/17/2017    PIH (pregnancy induced hypertension) 2008    Pneumonia 2009    h/o    Urinary incontinence        Past Surgical History:   Procedure Laterality Date    CYSTOURETHROSCOPY  5/2014    Dr. Cathy Gross  5-13-[facility]    Hysteroscopy novasure ablation anterior repair and urethral sling, Dr. Omar Valle EXTRACTION         Family History   Problem Relation Age of Onset    Hypertension Mother     Diabetes Mother     Heart Disease Father 48    High Blood Pressure Father     High Cholesterol Father     High Blood Pressure Sister     Cancer Maternal Grandmother         colon    Heart Disease Maternal Grandmother     Breast Cancer Maternal Grandmother     Heart Surgery Maternal Grandfather     Heart Disease Maternal Grandfather     Cancer Paternal Grandmother     Heart Disease Paternal Grandfather        Chief Complaint   Patient presents with    Gynecologic Exam     yearly. last pap 10/31/19 HPV neg. last mamm 08/03/21.      Other     pt thinks she has a prolapsed bladder           PE:  Vital Signs  Blood pressure 110/72, height 5' 6\" (1.676 m), weight 185 lb (83.9 kg), last menstrual period 10/01/2021, not currently breastfeeding. Estimated body mass index is 29.86 kg/m² as calculated from the following:    Height as of this encounter: 5' 6\" (1.676 m). Weight as of this encounter: 185 lb (83.9 kg). Labs:    No results found for this visit on 10/06/21. No data recorded    NURSE: MAXI    HPI: Patient here today for routine well woman exam.  Patient states she has a history of obtaining a bladder sling. However she does feel a lot of urgency and feels like things just are not sitting right in there. Review of Systems   Genitourinary: Positive for urgency. All other systems reviewed and are negative. Objective  Lymphatic:   no lymphadenopathy  Heent:   negative   Cor: regular rate and rhythm, no murmurs              Pul:clear to auscultation bilaterally- no wheezes, rales or rhonchi, normal air movement, no respiratory distress      GI: Abdomen soft, non-tender. BS normal. No masses,  No organomegaly           Extremities: normal strength, tone, and muscle mass   Breasts: Breast: Bilateral breast densities I did review last mammogram which was 2 months ago which is within normal limits   Pelvic Exam: GENITAL/URINARY:  External Genitalia:  General appearance; normal, Hair distribution; normal, Lesions absent  Urethra: Fullness absent, Masses absent  Bladder:  Masses absent, Tenderness absent, cystocele grade 2-3 noted  Vagina:  General appearance normal, Estrogen effect normal, Discharge absent, Lesions absent, Pelvic support normal  Cervix:  General appearance normal, Lesions absent, Discharge absent, Tenderness absent, Enlargement absent, Nodularity absent  Uterus:  Size normal, Contour normal  Adenexa: Masses absent                                    Vaginal discharge: no vaginal discharge                               Assessment and Plan          Diagnosis Orders   1. Well woman exam with routine gynecological exam     2.  Cystocele without uterine prolapse  Ambulatory referral to Physical Therapy       If PT does not help we will set patient up with Dr. Vane Golden      I am having Dagmar Boyce maintain her docusate sodium, OptiChamber Sangita, loratadine, Multiple Vitamins-Minerals (EQ MULTIVITAMINS ADULT GUMMY PO), albuterol sulfate HFA, metFORMIN (MOD), vitamin D, b complex vitamins, ezetimibe, metFORMIN, blood glucose monitor kit and supplies, blood glucose test strips, Lancets 77D, Trulicity, famotidine, and sertraline. Return in about 1 year (around 10/6/2022) for yearly referal to PT services for pelvic floor rehab and cystocele. She was also counseled on her preventative health maintenance recommendations and follow-up. Patient Instructions   SURVEY:    You may be receiving a survey regarding your visit today. Please complete the survey to enable us to provide the highest quality of care to you and your family. We strive for all 5's - thank you    If you cannot score us a very good (5) on any question, please call the office to discuss this with Nimo Patterson (our ). We would like to discuss how we could of made your experience a very good one. Nimo Patterson: [phone]    Thank you.           DYAN Harvey CNM,10/6/2021 2:06 PM

## 2021-10-06 NOTE — PATIENT INSTRUCTIONS
SURVEY:    You may be receiving a survey regarding your visit today. Please complete the survey to enable us to provide the highest quality of care to you and your family. We strive for all 5's - thank you    If you cannot score us a very good (5) on any question, please call the office to discuss this with Dominga Song (our ). We would like to discuss how we could of made your experience a very good one. Dominga Song: [phone]    Thank you.

## 2021-10-08 ENCOUNTER — PATIENT MESSAGE (OUTPATIENT)
Dept: FAMILY MEDICINE CLINIC | Age: 46
End: 2021-10-08

## 2021-10-08 RX ORDER — EZETIMIBE 10 MG/1
10 TABLET ORAL DAILY
Qty: 90 TABLET | Refills: 1 | Status: SHIPPED | OUTPATIENT
Start: 2021-10-08 | End: 2022-04-04

## 2021-10-08 RX ORDER — METFORMIN HYDROCHLORIDE 500 MG/1
TABLET, EXTENDED RELEASE ORAL
Qty: 90 TABLET | Refills: 1 | Status: SHIPPED | OUTPATIENT
Start: 2021-10-08 | End: 2022-01-17

## 2021-10-08 NOTE — TELEPHONE ENCOUNTER
Health Maintenance   Topic Date Due    Colon cancer screen colonoscopy  Never done    Flu vaccine (1) 09/01/2021    Diabetic retinal exam  05/25/2022 (Originally 2/14/1985)    Hepatitis B vaccine (1 of 3 - Risk 3-dose series) 05/25/2022 (Originally 2/14/1994)    Diabetic foot exam  02/03/2022    A1C test (Diabetic or Prediabetic)  09/27/2022    Diabetic microalbuminuria test  09/27/2022    Lipid screen  09/27/2022    Cervical cancer screen  10/31/2024    DTaP/Tdap/Td vaccine (2 - Td or Tdap) 10/03/2030    Pneumococcal 0-64 years Vaccine (2 of 2 - PPSV23) 02/14/2040    COVID-19 Vaccine  Completed    Hepatitis C screen  Completed    HIV screen  Completed    Hepatitis A vaccine  Aged Out    Hib vaccine  Aged Out    Meningococcal (ACWY) vaccine  Aged Out             (applicable per patient's age: Cancer Screenings, Depression Screening, Fall Risk Screening, Immunizations)    Hemoglobin A1C (%)   Date Value   09/27/2021 5.3   05/24/2021 5.3   02/03/2021 5.8     Microalb/Crt.  Ratio (mcg/mg creat)   Date Value   09/27/2021 CANNOT BE CALCULATED     LDL Cholesterol (mg/dL)   Date Value   09/27/2021 134 (H)     AST (U/L)   Date Value   09/27/2021 30     ALT (U/L)   Date Value   09/27/2021 34 (H)     BUN (mg/dL)   Date Value   09/27/2021 10      (goal A1C is < 7)   (goal LDL is <100) need 30-50% reduction from baseline     BP Readings from Last 3 Encounters:   10/06/21 110/72   09/28/21 124/86   05/25/21 132/74    (goal /80)      All Future Testing planned in [facility]:  Lab Frequency Next Occurrence   SHARIFA YVES DIGITAL DIAGNOSTIC UNILATERAL LEFT Once 08/04/2021   ALT Once 01/19/2022   AST Once 39/94/9632   Basic Metabolic Panel Once 41/85/7296   Hemoglobin A1C Once 01/19/2022   Lipid Panel Once 01/19/2022       Next Visit Date:  Future Appointments   Date Time Provider Shana Marin   2[facility]1/2022  8:45 [facility]   10/11/2022 11:10 AM DYAN Garner - [facility]            Patient Active Problem List:     Stress incontinence     Feeling of incomplete bladder emptying     Hypertension     Hyperlipidemia     Allergic rhinitis     Asthma     Migraine     Obesity due to excess calories     Fatty liver disease, nonalcoholic     Chronic rhinitis

## 2021-11-03 ENCOUNTER — HOSPITAL ENCOUNTER (OUTPATIENT)
Dept: PHYSICAL THERAPY | Age: 46
Setting detail: THERAPIES SERIES
Discharge: HOME | End: 2021-11-03
Payer: COMMERCIAL

## 2021-11-03 PROCEDURE — 97112 NEUROMUSCULAR REEDUCATION: CPT

## 2021-11-03 PROCEDURE — 97110 THERAPEUTIC EXERCISES: CPT

## 2021-11-03 PROCEDURE — 97161 PT EVAL LOW COMPLEX 20 MIN: CPT

## 2021-11-05 NOTE — PLAN OF CARE
Formerly [facility]           Phone: [phone]             Outpatient Physical Therapy  Fax: [phone]                                           Date: 11/3/2021  Patient: Kelly Leahy : 1975 [facility] #: [id number]   Referring Practitioner:  Daniela Johnson CNM Referral Date:  10/06/21       [x] Plan of Care   [] Updated Plan of Care    Dates of Service to Include: 11/3/2021 to 12/15/21    Diagnosis:  Cystocele without uterine prolapse, N81.10    Rehab (Treatment) Diagnosis:  Incontinence, Pelvic dysfunction             Onset Date:  21    Attendance  Total # of Visits to Date: 1 No Show: 0 Canceled Appointment: 0    Assessment  Assessment: This 55 y.o. female was referred for grade 2/3 cystocele without uterine prolapse and PMHx of Depression, asthma, allergies, DM, 3 natural child births with 1 resulting in 4th degree tear that required her vaginal canal to be packed with guaze. She has been having continence issues but presents with significant weakness and lack of coordination. Will progress patient as tolerated. Goals  Short term goals  Time Frame for Short term goals: 3 weeks  Short term goal 1: Initiate HEP  Short term goal 2: Educate and initiate use of bladder diary to better educate behaviors. Short term goal 3: Initiate use of surface EMG for neuro re-ed and strengthening to improve continence  Long term goals  Time Frame for Long term goals : 6 weeks  Long term goal 1: Pt will be independent and compliant with her HEP  Long term goal 2: Pt will report improved continence through her day especially to be less disruptive while at work and home. Long term goal 3: Pt PFM strength will be graded as at least 3 to 3+/5 with associated lift during contraction to demonstrate quality contraction.      Prognosis  Prognosis: Good    Treatment Plan   Times per week: 1  Plan weeks: 6  [x] HP/CP      [x] Electrical Stim   [x] Therapeutic Exercise      [] Gait Training  [] Aquatics   [] Ultrasound         [x] Patient Education/HEP   [] Manual Therapy  [] Traction    [x] Neuro-na        [] Soft Tissue Mobs            [] Home TENS  [] Iontophoresis    [] Orthotic casting/fitting      [] Dry Needling             Electronically signed by: Wanda Bhatti PT, DPT    Date: 11/3/2021      ______________________________________ Date: 11/3/2021   Physician Signature

## 2021-11-05 NOTE — PROGRESS NOTES
Phone: [address] [facility]          Fax: [phone]                      Outpatient Physical Therapy                                                                      Evaluation  Date: 11/3/2021  Patient: Chad Serra  : 1975  [facility] #: [id number]  Referring Practitioner: Mike Johnson CNM    Referral Date : 10/06/21     Diagnosis: Cystocele without uterine prolapse, N81.10    Treatment Diagnosis: Incontinence, Pelvic dysfunction  Onset Date: 21  PT Insurance Information: Medical [place]  Total # of Visits Approved: 6   Total # of Visits to Date: 1  No Show: 0  Canceled Appointment: 0     Subjective  Subjective: Pt states that after her initial surgery of an anterior repair/bladder sling with ablation which was about 6 or 7 years ago, she feels she has slid back and is now leaking again. She notes she was extremely \"bad\" prior to her surgery and peeing herself all the time but following surgery, she would have considered herself as 100% \"fixed\" and now she feels it is maybe 60 to 70% of what she was post op. She is not sure but when she feels she needs to have a BM, she has more of the sensation in her vagina than where she assumes she would feel it in the rectum. She now notes she definitely has frequency and now urgency issues. Pt reports she was told by the Nurse Midwife that she has a grade 3 prolapse. She also notes she feels pressure when she is standing but has never seen anything externally , she just urinates approx. every hour.   Comments: Used PFIQ - 7 scoring Bladder = 8, Bowel/rectum = 4, and 0 on vagina or pelvic section  Additional Pertinent Hx: Depression, asthma, allergies, DM, 3 natural child births with 1 resulting in 4th degree tear that required her vaginal canal to be packed with guaze             Objective     Observation/Palpation  Posture: Good      Ortho Screen  Posture: good    Abdominals  Diatasis: (-)  Abdominals: Weakness noted, TA contraction is 2/5. Pt is able to contract with cues. General strength is 3-/5    Pelvic Floor  Introitus: Functional and no pain  Perineal Descent: visible but poor  Skin Condition: no concerns  Excursion: minimal  External Clock: NT, no pain  Prolapse Test: Nothing visualized  Muscle Power: Poor, no lift with contraction and minimal to nil squeeze, 2 to 2+/5  Muscle Endurance (Seconds): 2 Seconds  Number Reps to Fatigue: 3  Quality of Contraction: poor    Pelvic Floor External Observations  Voluntary Contraction: Present  Involuntary Contraction: Present  Perineal Descent: Rest: Present  Perineal Descent Bearing Down: Present    Functional Outcome Measures  PIFQ - 7:  Scoring Bladder = 8, Bowel/rectum = 4, and 0 on vagina or pelvic section    Assessment  Assessment: This 55 y.o. female was referred for grade 2/3 cystocele without uterine prolapse and PMHx of Depression, asthma, allergies, DM, 3 natural child births with 1 resulting in 4th degree tear that required her vaginal canal to be packed with guaze. She has been having continence issues but presents with significant weakness and lack of coordination. Will progress patient as tolerated. Prognosis: Good        Decision Making: Low Complexity    Patient Education  Patient Education: Discussed bladder diary and HEP  Pt verbalized/demonstrated good understanding:     [X] Yes         [] No, pt required further clarification. Goals  Short term goals  Time Frame for Short term goals: 3 weeks  Short term goal 1: Initiate HEP  Short term goal 2: Educate and initiate use of bladder diary to better educate behaviors.   Short term goal 3: Initiate use of surface EMG for neuro re-ed and strengthening to improve continence    Long term goals  Time Frame for Long term goals : 6 weeks  Long term goal 1: Pt will be independent and compliant with her HEP  Long term goal 2: Pt will report improved continence through her day especially to be less disruptive while at work and home. Long term goal 3: Pt PFM strength will be graded as at least 3 to 3+/5 with associated lift during contraction to demonstrate quality contraction.       Patient goals : would like to have better strength for continence and also to assist with the prolapse        Minutes Tracking:  Time In: 0719  Time Out: 9646  Minutes: 85  Timed Code Treatment Minutes: 80 [address] [facility]   Date: 11/3/2021

## 2021-11-09 ENCOUNTER — HOSPITAL ENCOUNTER (OUTPATIENT)
Dept: PHYSICAL THERAPY | Age: 46
Setting detail: THERAPIES SERIES
Discharge: HOME | End: 2021-11-09
Payer: COMMERCIAL

## 2021-11-09 PROCEDURE — 97110 THERAPEUTIC EXERCISES: CPT

## 2021-11-09 PROCEDURE — 97112 NEUROMUSCULAR REEDUCATION: CPT

## 2021-11-09 NOTE — PROGRESS NOTES
Phone: Woo           Fax: [phone]                           Outpatient Physical Therapy                                                                            Daily Note    Patient: Rudy Oppenheim : 1975  CSN #: [id number]   Referring Practitioner:  Stephanie Johnson CNM    Referral Date : 10/06/21     Date: 2021    Diagnosis: Cystocele without uterine prolapse, N81.10  Treatment Diagnosis: Incontinence, Pelvic dysfunction    Onset Date: 21  PT Insurance Information: Medical [place]  Total # of Visits Approved: 6 Per Physician Order  Total # of Visits to Date: 2  No Show: 0  Canceled Appointment: 0      Pre-Treatment Pain:  0/10  Subjective: Pt states the exercises are going well. She reports she is even a little sore but she has been compliant and is doing them at least 1x/day and sometimes 2. Pt has no pain today and verbalizes she is ready to advance. Pt is in agreement with surface EMG and training with ex. Exercises:  Exercise 2: Surface EMG and blooming flower. Pre-set protocol for 5 sec work, 10 sec rest, x20 cycles      Assessment  Assessment: Instructed patient on surface EMG with 5 sec work cycle/10 sec rest cycle/ x20. Information was saved. This patient required cues to decrease compensation to the pelvic floor with work phase. Pt fatigued once approx 12 cycles were completed. Will continue to work toward strengthening and neuro re-ed. Activity Tolerance       Patient Education  Patient Education: Educated patient on [employer] and reviewed ex. Pt verbalized/demonstrated good understanding:     [x] Yes         [] No, pt required further clarification.        Post Treatment Pain:  0/10      Plan  Times per week: 1  Plan weeks: 6      Goals  (Total # of Visits to Date: 2)      Short term goals  Time Frame for Short term goals: 3 weeks  Short term goal 1: Initiate HEP - Met  Short term goal 2: Educate and initiate use of bladder diary to better educate behaviors. - Met and progressing  Short term goal 3: Initiate use of surface EMG for neuro re-ed and strengthening to improve continence - Met    Long term goals  Time Frame for Long term goals : 6 weeks  Long term goal 1: Pt will be independent and compliant with her HEP  Long term goal 2: Pt will report improved continence through her day especially to be less disruptive while at work and home. Long term goal 3: Pt PFM strength will be graded as at least 3 to 3+/5 with associated lift during contraction to demonstrate quality contraction.     Minutes Tracking:  Time In: 1030  Time Out: 2619  Minutes: 54  Timed Code Treatment Minutes: Louann 1690, [place], DPT     Date: 11/9/2021

## 2021-11-17 ENCOUNTER — HOSPITAL ENCOUNTER (OUTPATIENT)
Dept: PHYSICAL THERAPY | Age: 46
Setting detail: THERAPIES SERIES
Discharge: HOME | End: 2021-11-17
Payer: COMMERCIAL

## 2021-11-17 PROCEDURE — 97110 THERAPEUTIC EXERCISES: CPT

## 2021-11-17 PROCEDURE — 97112 NEUROMUSCULAR REEDUCATION: CPT

## 2021-11-23 NOTE — PROGRESS NOTES
Phone: Woo           Fax: [phone]                           Outpatient Physical Therapy                                                                            Daily Note    Patient: Ezequiel Galloway : 1975  CSN #: [id number]   Referring Practitioner:  French Johnson CNM    Referral Date : 10/06/21     Date: 2021    Diagnosis: Cystocele without uterine prolapse, N81.10  Treatment Diagnosis: Incontinence, Pelvic dysfunction    Onset Date: 21  PT Insurance Information: Medical [place]  Total # of Visits Approved: 6 Per Physician Order  Total # of Visits to Date: 3  No Show: 0  Canceled Appointment: 0      Pre-Treatment Pain:  0/10  Subjective: Pt reports she is pleased with her current status and HEP. Pt reports compliancy and she is having no difficulty. Pt has no pain reported. She states good understanding of HEP    Exercises:  Exercise 3: HEP progressed: 4 way T-band, monster walk, lateral monster walk, UE T-band with core    Assessment  Assessment: Pt was able to perform all instructed exercise and demonstrated with good technique. Pt contraction more visual and requires nil to min reminders to engage with ex. Activity Tolerance   Good tolerance    Patient Education  Patient Education: Pt was progressed regarding HEP with good understanding. Pt verbalized/demonstrated good understanding:     [x] Yes         [] No, pt required further clarification. Post Treatment Pain:  0/10      Plan  Times per week: 1  Plan weeks: 6      Goals  (Total # of Visits to Date: 3)      Short term goals  Time Frame for Short term goals: 3 weeks  Short term goal 1: Initiate HEP - Met  Short term goal 2: Educate and initiate use of bladder diary to better educate behaviors. - Met and progressing  Short term goal 3: Initiate use of surface EMG for neuro re-ed and strengthening to improve continence - Met    Long term goals  Time Frame for Long term goals : 6 weeks  Long term goal 1: Pt will be independent and compliant with her HEP  Long term goal 2: Pt will report improved continence through her day especially to be less disruptive while at work and home. Long term goal 3: Pt PFM strength will be graded as at least 3 to 3+/5 with associated lift during contraction to demonstrate quality contraction.     Minutes Tracking:  Time In: 1031  Time Out: 1130  Minutes: 59  Timed Code Treatment Minutes: Zoran [address], [address] [facility], [facility]       Date: 11/17/2021

## 2021-11-30 ENCOUNTER — HOSPITAL ENCOUNTER (OUTPATIENT)
Dept: PHYSICAL THERAPY | Age: 46
Setting detail: THERAPIES SERIES
Discharge: HOME | End: 2021-11-30
Payer: COMMERCIAL

## 2021-11-30 PROCEDURE — 97110 THERAPEUTIC EXERCISES: CPT

## 2021-11-30 PROCEDURE — 97112 NEUROMUSCULAR REEDUCATION: CPT

## 2021-12-06 NOTE — PROGRESS NOTES
Phone: Woo           Fax: [phone]                           Outpatient Physical Therapy                                                                            Daily Note    Patient: Chad Serra : 1975  CSN #: [id number]   Referring Practitioner:  Mike Johnson CNM    Referral Date : 10/06/21     Date: 2021    Diagnosis: Cystocele without uterine prolapse, N81.10  Treatment Diagnosis: Incontinence, Pelvic dysfunction    Onset Date: 21  PT Insurance Information: Medical [place]  Total # of Visits Approved: 6 Per Physician Order  Total # of Visits to Date: 4  No Show: 0  Canceled Appointment: 0      Pre-Treatment Pain:  0/10  Subjective: Pt states she worked a lot over the weekend and holiday. She has been working on [employer] and is doing well but also notes that she is getting some scapular and shoulder pain. Pt does feel her overall strength is improving and she is pleased with that. Pt is willing to proceed with surface EMG training plus independent HEP. Pt may look into [employer] or similar tool for home training in the future, as appropriate. Exercises:  Exercise 2: Surface EMG and blooming flower. Pre-set protocol for 5 sec work, 10 sec rest, x20 cycles  Exercise 3: HEP progressed: 4 way T-band, monster walk, lateral monster walk, UE T-band with core - reviewed      Assessment  Assessment: Pt worked on surface EMG. We did work on the baseline protocol of 5 sec hold with 5 sec rest, x20 and added increased time to a 10 sec hold, 10 sec rest, x20. Pt did fatigue and had difficulty with the flower activity at the end of her session. Will continue to advance as tolerated. Activity Tolerance   Good    Patient Education  Patient Education: Discussed some postural ex and where to  order to decrease the resistance especially when she notes fatigue. Pt understood and was issued ex.   Pt verbalized/demonstrated good understanding: [x] Yes         [] No, pt required further clarification. Post Treatment Pain:  0/10      Plan  Times per week: 1  Plan weeks: 6      Goals  (Total # of Visits to Date: 4)      Short term goals  Time Frame for Short term goals: 3 weeks  Short term goal 1: Initiate HEP - Met  Short term goal 2: Educate and initiate use of bladder diary to better educate behaviors. - Met and progressing  Short term goal 3: Initiate use of surface EMG for neuro re-ed and strengthening to improve continence - Met    Long term goals  Time Frame for Long term goals : 6 weeks  Long term goal 1: Pt will be independent and compliant with her HEP  Long term goal 2: Pt will report improved continence through her day especially to be less disruptive while at work and home. Long term goal 3: Pt PFM strength will be graded as at least 3 to 3+/5 with associated lift during contraction to demonstrate quality contraction.     Minutes Tracking:  Time In: 4116  Time Out: 1110  Minutes: 56  Timed Code Treatment Minutes: 48 Tyshawn Mcdowell DPNANCI       Date: 11/30/2021

## 2021-12-10 ENCOUNTER — HOSPITAL ENCOUNTER (OUTPATIENT)
Dept: PHYSICAL THERAPY | Age: 46
Setting detail: THERAPIES SERIES
Discharge: HOME | End: 2021-12-10
Payer: COMMERCIAL

## 2021-12-10 PROCEDURE — 97110 THERAPEUTIC EXERCISES: CPT

## 2021-12-10 PROCEDURE — 97112 NEUROMUSCULAR REEDUCATION: CPT

## 2021-12-10 RX ORDER — DULAGLUTIDE 0.75 MG/.5ML
INJECTION, SOLUTION SUBCUTANEOUS
Qty: 2 ML | Refills: 3 | Status: SHIPPED | OUTPATIENT
Start: 2021-12-10 | End: 2022-03-01

## 2021-12-10 NOTE — PROGRESS NOTES
Phone: Woo           Fax: [phone]                           Outpatient Physical Therapy                                                                            Daily Note    Patient: Bienvenido Jones : 1975  CSN #: [id number]   Referring Practitioner:  Hyacinth Johnson CNM    Referral Date : 10/06/21     Date: 12/10/2021    Diagnosis: Cystocele without uterine prolapse, N81.10  Treatment Diagnosis: Incontinence, Pelvic dysfunction    Onset Date: 21  PT Insurance Information: Medical [place]  Total # of Visits Approved: 6 Per Physician Order  Total # of Visits to Date: 5  No Show: 0  Canceled Appointment: 0      Pre-Treatment Pain:  0/10  Subjective: Pt states she is noticing less uninary frequency and urgency. Pt also states she is noticing that she works a 12 hour shift she is not also able to go without feeling \"pressure\" through 10 hours then she notes it but is also able to do her ex and get it back under control more than she did before. Pt feels much more overall control. Exercises:  Exercise 4: 12/10/21 Advanced Kegel to 6 sec hold and 12 sec hold for rest      Assessment  Assessment: This patient worked through 3 sessions with surface EMG. Individualized protocol to includin second hold and 12 sec rest, x20 reps. Initially, this patient was able to range between 20 and 30 mV on average. Pt noted min compensation toward end of 1st session, 2nd session the mV ranges from 20 to 23 with slight fatigue noted after 10 reps. During 2nd session, we worked to implement diaphragmatic breathing with the rest cycle. Pt was able to fully relax with concentration. 3rd session remained consistent however less mV. Pt advanced to HEP to include Kegel, quick flicks and Kegel endurance holds for 6 sec hold, 12 sec rest to work on over the week for PFM strengthening.     Activity Tolerance   Good    Patient Education  Patient Education: Discussed progression and continuing 2 more weeks then possible hold x1 month  Pt verbalized/demonstrated good understanding:     [x] Yes         [] No, pt required further clarification. Post Treatment Pain:  0/10      Plan  Times per week: 1  Plan weeks: 6      Goals  (Total # of Visits to Date: 5)      Short term goals  Time Frame for Short term goals: 3 weeks  Short term goal 1: Initiate HEP - Met  Short term goal 2: Educate and initiate use of bladder diary to better educate behaviors. - Met and progressing  Short term goal 3: Initiate use of surface EMG for neuro re-ed and strengthening to improve continence - Met    Long term goals  Time Frame for Long term goals : 6 weeks  Long term goal 1: Pt will be independent and compliant with her HEP  Long term goal 2: Pt will report improved continence through her day especially to be less disruptive while at work and home. Long term goal 3: Pt PFM strength will be graded as at least 3 to 3+/5 with associated lift during contraction to demonstrate quality contraction.     Minutes Tracking:  Time In: 1101  Time Out: 1200  Minutes: 59  Timed Code Treatment Minutes: Zoran Dalton, [place], DPT     Date: 12/10/2021

## 2021-12-15 ENCOUNTER — HOSPITAL ENCOUNTER (OUTPATIENT)
Dept: PHYSICAL THERAPY | Age: 46
Setting detail: THERAPIES SERIES
Discharge: HOME | End: 2021-12-15
Payer: COMMERCIAL

## 2021-12-15 PROCEDURE — 97110 THERAPEUTIC EXERCISES: CPT

## 2021-12-15 PROCEDURE — 97112 NEUROMUSCULAR REEDUCATION: CPT

## 2021-12-15 NOTE — PLAN OF CARE
[facility]           Phone: [phone]             Outpatient Physical Therapy  Fax: [phone]                                           Date: 12/15/2021  Patient: Ryan Flores : 1975 [facility] #: [id number]   Referring Practitioner:  Myron Boas APRN - CNM Referral Date:  10/06/21       [] Plan of Care   [x] Updated Plan of Care    Dates of Service to Include: 12/15/2021 to 22    Diagnosis:  Cystocele without uterine prolapse, N81.10    Rehab (Treatment) Diagnosis:  Incontinence, Pelvic dysfunction             Onset Date:  21    Attendance  Total # of Visits to Date: 6 No Show: 0 Canceled Appointment: 0    Assessment  Assessment: Patient continued with sessions using surface EMG. We increased hold time to 7 sec, rest time 14 sec. We worked through 3 sessions and the last session we used a ball squeeze to engage adductors. Pt did fatigue and had difficulty during the final set but tolerated well. She was instructed to continue with increased hold at home. Pt has improved tolerance to work shift and is noticing less pressure and ability to hold. Pt is effective now with her kegel and engages the proper muscles. We are beginning to incorporate functional progressions with her ex. Pt is progressing well. Will continue to advance then discharge to [facility] when patient is comfortable with progression. Goals  Short term goals  Time Frame for Short term goals: 3 weeks  Short term goal 1: Initiate HEP - Met  Short term goal 2: Educate and initiate use of bladder diary to better educate behaviors. - Met and progressing  Short term goal 3: Initiate use of surface EMG for neuro re-ed and strengthening to improve continence - Met  Long term goals  Time Frame for Long term goals : 6 weeks  Long term goal 1: Pt will be independent and compliant with her HEP  Long term goal 2: Pt will report improved continence through her day especially to be less disruptive while at work and home. Long term goal 3: Pt PFM strength will be graded as at least 3 to 3+/5 with associated lift during contraction to demonstrate quality contraction.      Prognosis  Prognosis: Good    Treatment Plan   Times per week: 1  Plan weeks: 6  [] HP/CP      [] Electrical Stim   [x] Therapeutic Exercise      [] Gait Training  [] Aquatics   [] Ultrasound         [x] Patient Education/HEP   [] Manual Therapy  [] Traction    [x] Neuro-na        [] Soft Tissue Mobs            [] Home TENS  [] Iontophoresis    [] Orthotic casting/fitting      [] Dry Needling             Electronically signed by: Ken Gamble PT, DPT    Date: 12/15/2021      ______________________________________ Date: 12/15/2021   Physician Signature

## 2021-12-15 NOTE — PROGRESS NOTES
Phone: [phone]                 [facility]           Fax: [phone]                           Outpatient Physical Therapy                                                                            Daily Note    Patient: Zoe Cox : 1975  CSN #: [id number]   Referring Practitioner:  Brett Johnson CNM    Referral Date : 10/06/21     Date: 12/15/2021    Diagnosis: Cystocele without uterine prolapse, N81.10  Treatment Diagnosis: Incontinence, Pelvic dysfunction    Onset Date: 21  PT Insurance Information: Medical [place]  Total # of Visits Approved: 6 Per Physician Order  Total # of Visits to Date: 6  No Show: 0  Canceled Appointment: 0      Pre-Treatment Pain:  0/10  Subjective: Pt reports exercises are going well and she has been working on 6 sec hold and 12 sec rest. Pt notes fatigue after 2 sessions but is doing well. Pt has no pain. Assessment  Assessment: Patient continued with sessions using surface EMG. We increased hold time to 7 sec, rest time 14 sec. We worked through 3 sessions and the last session we used a ball squeeze to engage adductors. Pt did fatigue and had difficulty during the final set but tolerated well. She was instructed to continue with increased hold at home. Activity Tolerance   Good    Patient Education  Patient Education: Added Kegel with ball squeeze for last trial of surface EMG. Added ball squeeze to HEP with kegel. Pt verbalized/demonstrated good understanding:     [x] Yes         [] No, pt required further clarification. Post Treatment Pain:  0/10      Plan  Times per week: 1  Plan weeks: 6      Goals  (Total # of Visits to Date: 6)      Short term goals  Time Frame for Short term goals: 3 weeks  Short term goal 1: Initiate HEP - Met  Short term goal 2: Educate and initiate use of bladder diary to better educate behaviors. - Met and progressing  Short term goal 3: Initiate use of surface EMG for neuro re-ed and strengthening to improve continence - Met    Long term goals  Time Frame for Long term goals : 6 weeks  Long term goal 1: Pt will be independent and compliant with her HEP  Long term goal 2: Pt will report improved continence through her day especially to be less disruptive while at work and home. Long term goal 3: Pt PFM strength will be graded as at least 3 to 3+/5 with associated lift during contraction to demonstrate quality contraction.     Minutes Tracking:  Time In: 0715  Time Out: 0800  Minutes: 45  Timed Code Treatment Minutes: [address] [place], [place], DPT      Date: 12/15/2021

## 2022-01-11 ENCOUNTER — HOSPITAL ENCOUNTER (OUTPATIENT)
Dept: PHYSICAL THERAPY | Age: 47
Setting detail: THERAPIES SERIES
Discharge: HOME | End: 2022-01-11
Payer: COMMERCIAL

## 2022-01-11 PROCEDURE — 97112 NEUROMUSCULAR REEDUCATION: CPT

## 2022-01-11 PROCEDURE — 97110 THERAPEUTIC EXERCISES: CPT

## 2022-01-12 NOTE — PLAN OF CARE
[facility]           Phone: [phone]             Outpatient Physical Therapy  Fax: [phone]                                           Date: 2022  Patient: Brielle Domínguez : 1975 CSN #: [id number]   Referring Practitioner:  Lizette Johnson CNM Referral Date:  10/06/21       [] Plan of Care   [x] Updated Plan of Care    Dates of Service to Include: 2022 to 22    Diagnosis:  Cystocele without uterine prolapse, N81.10    Rehab (Treatment) Diagnosis:  Incontinence, Pelvic dysfunction             Onset Date:  21    Attendance  Total # of Visits to Date: 7 No Show: 0 Canceled Appointment: 0    Assessment  Assessment: Pt continues to improve. Her mm contraction is more consistent and stable with engagement of adductors with surface EMG. Ex was given for HEP in sitting for further strengthening. Pt will continue on a 2 time per month basis until discharge. Pt is in agreement. Goals  Short term goals  Time Frame for Short term goals: 3 weeks  Short term goal 1: Initiate HEP - Met  Short term goal 2: Educate and initiate use of bladder diary to better educate behaviors. - Met  Short term goal 3: Initiate use of surface EMG for neuro re-ed and strengthening to improve continence - Met  Long term goals  Time Frame for Long term goals : 6 weeks  Long term goal 1: Pt will be independent and compliant with her HEP - Met and cont  Long term goal 2: Pt will report improved continence through her day especially to be less disruptive while at work and home. - Met  Long term goal 3: Pt PFM strength will be graded as at least 3 to 3+/5 with associated lift during contraction to demonstrate quality contraction.      Prognosis  Prognosis: Good    Treatment Plan   Times per week: 1  Plan weeks: 6  [] HP/CP      [] Electrical Stim   [x] Therapeutic Exercise      [] Gait Training  [] Aquatics   [] Ultrasound         [x] Patient Education/HEP   [] Manual Therapy  [] Traction    [x] Neuro-na        [] Soft Tissue Mobs            [] Home TENS  [] Iontophoresis    [] Orthotic casting/fitting      [] Dry Needling             Electronically signed by: Rob Novak PT, DPT    Date: 1/11/2022      ______________________________________ Date: 1/11/2022   Physician Signature

## 2022-01-12 NOTE — PROGRESS NOTES
Phone: [facility]           Fax: [phone]                           Outpatient Physical Therapy                                                                            Daily Note    Patient: Yvonne Servin : 1975  CSN #: [id number]   Referring Practitioner:  Bharath Johnson CNM    Referral Date : 10/06/21     Date: 2022    Diagnosis: Cystocele without uterine prolapse, N81.10  Treatment Diagnosis: Incontinence, Pelvic dysfunction    Onset Date: 21  PT Insurance Information: [employer] [place]  Total # of Visits Approved: 12 Per Physician Order  Total # of Visits to Date: 7  No Show: 0  Canceled Appointment: 0      Pre-Treatment Pain:  0/10  Subjective: Pt states that she has been ill and that is why she was unable to attend her visits. She is doing fine now. She has been as compliant as she could while being sick. Pt reports no pain. Exercises:  Exercise 5: 22 Added functional kegel activities in multiple positions to patient HEP. Refer to chart      Assessment  Assessment: Pt continues to improve. Her mm contraction is more consistent and stable with engagement of adductors with surface EMG. Ex was given for HEP in sitting for further strengthening. Pt will continue on a 2 time per month basis until discharge. Pt is in agreement. Activity Tolerance       Patient Education  Patient Education: Purpose of ball squeeze and Kegel. Also discussed functional positions. Pt verbalized/demonstrated good understanding:     [x] Yes         [] No, pt required further clarification. Post Treatment Pain:  0/10      Plan  Times per week: 1  Plan weeks: 6      Goals  (Total # of Visits to Date: 7)      Short term goals  Time Frame for Short term goals: 3 weeks  Short term goal 1: Initiate HEP - Met  Short term goal 2: Educate and initiate use of bladder diary to better educate behaviors. - Met  Short term goal 3: Initiate use of surface EMG for neuro re-ed and strengthening to improve continence - Met    Long term goals  Time Frame for Long term goals : 6 weeks  Long term goal 1: Pt will be independent and compliant with her HEP - Met and cont  Long term goal 2: Pt will report improved continence through her day especially to be less disruptive while at work and home. - Met  Long term goal 3: Pt PFM strength will be graded as at least 3 to 3+/5 with associated lift during contraction to demonstrate quality contraction.     Minutes Tracking:  Time In: 1017  Time Out: 1116  Minutes: 59  Timed Code Treatment Minutes: Zoran 38, [place], DPT      Date: 1/11/2022

## 2022-01-17 RX ORDER — METFORMIN HYDROCHLORIDE 500 MG/1
TABLET, EXTENDED RELEASE ORAL
Qty: 90 TABLET | Refills: 1 | Status: SHIPPED | OUTPATIENT
Start: 2022-01-17 | End: 2022-04-04

## 2022-01-28 ENCOUNTER — HOSPITAL ENCOUNTER (OUTPATIENT)
Dept: PHYSICAL THERAPY | Age: 47
Setting detail: THERAPIES SERIES
Discharge: HOME | End: 2022-01-28
Payer: COMMERCIAL

## 2022-01-28 NOTE — PROGRESS NOTES
[facility]  Inpatient/Observation/Outpatient Rehabilitation    Date: 2022  Patient Name: Avni Staton       [] Inpatient Acute/Observation       [x]  Outpatient  : 1975       [] Pt no showed for scheduled appointment    [] Pt refused/declined therapy at this time due to:           [x] Pt cancelled due to:  [] No Reason Given   [x] Sick/ill- Patient left a message to cancel, stating they were sick and would call back to reschedule.     Quincy Band Date: 2022

## 2022-01-31 ENCOUNTER — HOSPITAL ENCOUNTER (OUTPATIENT)
Age: 47
Discharge: HOME | End: 2022-01-31
Payer: COMMERCIAL

## 2022-01-31 DIAGNOSIS — E78.5 HYPERLIPIDEMIA, UNSPECIFIED HYPERLIPIDEMIA TYPE: ICD-10-CM

## 2022-01-31 DIAGNOSIS — I10 HYPERTENSION, UNSPECIFIED TYPE: ICD-10-CM

## 2022-01-31 DIAGNOSIS — E11.9 TYPE 2 DIABETES MELLITUS WITHOUT COMPLICATION, WITHOUT LONG-TERM CURRENT USE OF INSULIN (HCC): ICD-10-CM

## 2022-01-31 LAB
ALT SERPL-CCNC: 26 U/L (ref 5–33)
ANION GAP SERPL CALCULATED.3IONS-SCNC: 15 MMOL/L (ref 9–17)
AST SERPL-CCNC: 21 U/L
BUN BLDV-MCNC: 13 MG/DL (ref 6–20)
BUN/CREAT BLD: 20 (ref 9–20)
CALCIUM SERPL-MCNC: 10.2 MG/DL (ref 8.6–10.4)
CHLORIDE BLD-SCNC: 99 MMOL/L (ref 98–107)
CHOLESTEROL/HDL RATIO: 5
CHOLESTEROL: 258 MG/DL
CO2: 23 MMOL/L (ref 20–31)
CREAT SERPL-MCNC: 0.64 MG/DL (ref 0.5–0.9)
ESTIMATED AVERAGE GLUCOSE: 108 MG/DL
GFR AFRICAN AMERICAN: >60 ML/MIN
GFR NON-AFRICAN AMERICAN: >60 ML/MIN
GFR SERPL CREATININE-BSD FRML MDRD: NORMAL ML/MIN/{1.73_M2}
GFR SERPL CREATININE-BSD FRML MDRD: NORMAL ML/MIN/{1.73_M2}
GLUCOSE BLD-MCNC: 92 MG/DL (ref 70–99)
HBA1C MFR BLD: 5.4 % (ref 4–6)
HDLC SERPL-MCNC: 52 MG/DL
LDL CHOLESTEROL: 139 MG/DL (ref 0–130)
POTASSIUM SERPL-SCNC: 4.2 MMOL/L (ref 3.7–5.3)
SODIUM BLD-SCNC: 137 MMOL/L (ref 135–144)
TRIGL SERPL-MCNC: 335 MG/DL
VLDLC SERPL CALC-MCNC: ABNORMAL MG/DL (ref 1–30)

## 2022-01-31 PROCEDURE — 36415 COLL VENOUS BLD VENIPUNCTURE: CPT

## 2022-01-31 PROCEDURE — 80048 BASIC METABOLIC PNL TOTAL CA: CPT

## 2022-01-31 PROCEDURE — 80061 LIPID PANEL: CPT

## 2022-01-31 PROCEDURE — 84450 TRANSFERASE (AST) (SGOT): CPT

## 2022-01-31 PROCEDURE — 84460 ALANINE AMINO (ALT) (SGPT): CPT

## 2022-01-31 PROCEDURE — 83036 HEMOGLOBIN GLYCOSYLATED A1C: CPT

## 2022-02-01 ENCOUNTER — OFFICE VISIT (OUTPATIENT)
Dept: FAMILY MEDICINE CLINIC | Age: 47
End: 2022-02-01
Payer: COMMERCIAL

## 2022-02-01 VITALS
BODY MASS INDEX: 29.41 KG/M2 | DIASTOLIC BLOOD PRESSURE: 68 MMHG | HEIGHT: 66 IN | SYSTOLIC BLOOD PRESSURE: 116 MMHG | WEIGHT: 183 LBS | OXYGEN SATURATION: 97 %

## 2022-02-01 DIAGNOSIS — E11.9 TYPE 2 DIABETES MELLITUS WITHOUT COMPLICATION, WITHOUT LONG-TERM CURRENT USE OF INSULIN (HCC): Primary | ICD-10-CM

## 2022-02-01 DIAGNOSIS — J45.21 MILD INTERMITTENT ASTHMA WITH ACUTE EXACERBATION: ICD-10-CM

## 2022-02-01 DIAGNOSIS — R05.9 COUGH: ICD-10-CM

## 2022-02-01 DIAGNOSIS — K76.0 FATTY LIVER DISEASE, NONALCOHOLIC: ICD-10-CM

## 2022-02-01 DIAGNOSIS — I10 HYPERTENSION, UNSPECIFIED TYPE: ICD-10-CM

## 2022-02-01 DIAGNOSIS — E78.5 HYPERLIPIDEMIA, UNSPECIFIED HYPERLIPIDEMIA TYPE: ICD-10-CM

## 2022-02-01 PROCEDURE — 99214 OFFICE O/P EST MOD 30 MIN: CPT | Performed by: FAMILY MEDICINE

## 2022-02-01 RX ORDER — ALBUTEROL SULFATE 90 UG/1
2 AEROSOL, METERED RESPIRATORY (INHALATION) EVERY 6 HOURS PRN
Qty: 1 EACH | Refills: 3 | Status: SHIPPED | OUTPATIENT
Start: 2022-02-01 | End: 2022-06-22

## 2022-02-01 ASSESSMENT — PATIENT HEALTH QUESTIONNAIRE - PHQ9
SUM OF ALL RESPONSES TO PHQ QUESTIONS 1-9: 0
1. LITTLE INTEREST OR PLEASURE IN DOING THINGS: 0
SUM OF ALL RESPONSES TO PHQ9 QUESTIONS 1 & 2: 0
SUM OF ALL RESPONSES TO PHQ QUESTIONS 1-9: 0
SUM OF ALL RESPONSES TO PHQ QUESTIONS 1-9: 0
2. FEELING DOWN, DEPRESSED OR HOPELESS: 0
SUM OF ALL RESPONSES TO PHQ QUESTIONS 1-9: 0
DEPRESSION UNABLE TO ASSESS: PT REFUSES

## 2022-02-01 NOTE — PATIENT INSTRUCTIONS
SURVEY:    You may be receiving a survey from [employer] regarding your visit today. Please complete the survey to enable us to provide the highest quality of care to you and your family. If you cannot score us a very good on any question, please call the office to discuss how we could of made your experience a very good one. Thank you.

## 2022-02-01 NOTE — PROGRESS NOTES
I, Nabeel Esc[facility], am scribing for and in the presence of Dr. Viviana Santa. 2/1/22/9:10/CRF    [address]  [place] [phone]  Dept: [phone]    Pepper Kwan is a 55 y.o. female here for Follow-up, Hypertension, and Hyperlipidemia      HPI:  HYPERTENSION  She is not exercising and is adherent to a low carb diet. Blood pressure is not being monitored at home. Cardiac symptoms: none. Patient denies: chest pain, dyspnea and palpitations.     HYPERLIPIDEMIA   Medication compliance: n/a   Patient is following a low carb diet. She is not exercising regularly:  Prior to Admission medications    Medication Sig Start Date End Date Taking? Authorizing Provider   albuterol sulfate HFA (PROAIR HFA) 108 (90 Base) MCG/ACT inhaler Inhale 2 puffs into the lungs every 6 hours as needed for Wheezing 2/1/22  Yes Viviana Santa MD   metFORMIN (GLUCOPHAGE-XR) 500 MG extended release tablet TAKE 1 TABLET BY MOUTH ONE TIME A DAY WITH BREAKFAST 1/17/22  Yes Viviana Santa MD   TRULICITY 5.47 EJ/5.7[facility] INJECT THE CONTENTS OF ONE SYRINGE UNDER THE SKIN ONCE WEEKLY 12/10/21  Yes Viviana Santa MD   ezetimibe (ZETIA) 10 MG tablet Take 1 tablet by mouth daily 10/8/21  Yes Viviana Santa MD   famotidine (PEPCID) 20 MG tablet Take 20 mg by mouth 2 times daily    Yes Historical Provider, MD   sertraline (ZOLOFT) 50 MG tablet Take 1 tablet by mouth daily 9/28/21  Yes Viviana Santa MD   Lancets 28G MISC 1 Device by Does not apply route 2 times daily PRODIGY LANCETS 4/7/21  Yes Viviana Santa MD   blood glucose monitor kit and supplies 1 kit by Other route 2 times daily Dispense prodigy meter per insurance coverage 4/5/21  Yes Viviana Santa MD   blood glucose monitor strips Test 2 times a day & as needed for symptoms of irregular blood glucose.  Dispense sufficient amount for indicated testing frequency plus additional to accommodate PRN testing needs also, dispense prodigy test strips per insurance coverage 4/5/21  Yes Ella Rios MD   Cholecalciferol (VITAMIN D) 50 MCG (2000 UT) CAPS capsule Take 4,000 Units by mouth daily 12/2/20  Yes Ella Rios MD   Multiple Vitamins-Minerals (EQ MULTIVITAMINS ADULT GUMMY PO) Take by mouth daily   Yes Historical Provider, MD   loratadine (CLARITIN) 10 MG tablet Take 10 mg by mouth as needed    Yes Historical Provider, MD   metFORMIN, MOD, (GLUMETZA) 500 MG extended release tablet Take 1 tablet by mouth daily (with breakfast) DX CODE: R73.9  Patient not taking: Reported on 10/6/2021 10/27/20   Ella Rios MD   Spacer/Aero-Holding Chambers [facility] DIAMOND) [place] 1 Device by Does not apply route daily  Patient not taking: Reported on 10/6/2021 10/28/15   Ella Rios MD   docusate sodium (COLACE) 100 MG capsule Take 1 capsule by mouth daily as needed for Constipation. Patient not taking: Reported on 10/6/2021 5/13/14   Soren White MD       ROS:  General Constitutional: Denies chills. Denies fever. Denies headache. Denies lightheadedness. Ophthalmologic: Denies blurred vision. ENT: Denies nasal congestion. Denies sore throat. Denies ear pain and pressure. Respiratory: Denies cough. Denies shortness of breath. Denies wheezing. Cardiovascular: Denies chest pain at rest. Denies irregular heartbeat. Denies palpitations. Gastrointestinal: Denies abdominal pain. Denies blood in the stool. admits constipation. Denies diarrhea. Denies nausea. Denies vomiting. Genitourinary: Denies blood in the urine. Denies difficulty urinating. Denies frequent urination. Denies painful urination. Denies urinary incontinence. Musculoskeletal: Denies muscle aches. Denies painful joints. Denies swollen joints. Peripheral Vascular: Denies pain/cramping in legs after exertion. Skin: Denies dry skin. Denies itching. Denies rash. Neurologic: Denies falls. Denies dizziness. Denies fainting. Denies tingling/numbness. Psychiatric: Denies sleep disturbance. Denies anxiety. Denies depressed mood. Past Surgical History:   Procedure Laterality Date    CYSTOURETHROSCOPY  5/2014    Dr. Ed Jacome  [facility]    Hysteroscopy novasure ablation anterior repair and urethral sling, Dr. Edy Gil EXTRACTION         Family History   Problem Relation Age of Onset    Hypertension Mother     Diabetes Mother     Heart Disease Father 48    High Blood Pressure Father     High Cholesterol Father     High Blood Pressure Sister     Cancer Maternal Grandmother         colon    Heart Disease Maternal Grandmother     Breast Cancer Maternal Grandmother     Heart Surgery Maternal Grandfather     Heart Disease Maternal Grandfather     Cancer Paternal Grandmother     Heart Disease Paternal Grandfather        Past Medical History:   Diagnosis Date    Allergic rhinitis 1995    Asthma 2011    Elevated liver enzymes 2011    Fatty liver disease, nonalcoholic 7/21/7225    Rufus's syndrome     Hyperlipidemia 2011    Hypertension 2011    h/o now controlled    Migraine     Obesity due to excess calories 11/17/2017    PIH (pregnancy induced hypertension) 2008    Pneumonia 2009    h/o    Urinary incontinence       Social History     Tobacco Use    Smoking status: Former Smoker     Types: Cigarettes    Smokeless tobacco: Never Used    Tobacco comment: was a social smoker previously   Substance Use Topics    Alcohol use:  Yes     Alcohol/week: 0.0 standard drinks     Comment: occ      Current Outpatient Medications   Medication Sig Dispense Refill    albuterol sulfate HFA (PROAIR HFA) 108 (90 Base) MCG/ACT inhaler Inhale 2 puffs into the lungs every 6 hours as needed for Wheezing 1 each 3    metFORMIN (GLUCOPHAGE-XR) 500 MG extended release tablet TAKE 1 TABLET BY MOUTH ONE TIME A DAY WITH BREAKFAST 90 tablet 1    TRULICITY 5.23 QI/2.0FS SOPN INJECT THE CONTENTS OF ONE SYRINGE UNDER THE SKIN ONCE 110/72   09/28/21 124/86       General Appearance: in no acute distress, well developed, well nourished. Eyes: pupils equal, round reactive to light and accommodation. Ears: normal canal and TM's. Nose: nares patent, no lesions. Oral Cavity: mucosa moist.  Throat: clear. Neck/Thyroid: neck supple, full range of motion, no cervical lymphadenopathy, no thyromegaly or carotid bruits. Skin: warm and dry. No suspicious lesions. Heart: regular rate and rhythm. No murmurs. S1, S2 normal, no gallops. Lungs: clear to auscultation bilaterally. Abdomen: bowel sounds present, soft, nontender, nondistended, no masses or organomegaly. Musculoskeletal: normal, full range of motion in knees and hips, no swelling or tenderness. Extremities: no cyanosis or edema. Peripheral Pulses: 2+ throughout, symetric. Neurologic: nonfocal, motor strength normal upper and lower extremities, sensory exam intact. Psych: normal affect, speech fluent. ASSESSMENT:   Diagnosis Orders   1. Type 2 diabetes mellitus without complication, without long-term current use of insulin ([facility])  CBC Auto Differential    ALT    AST    Basic Metabolic Panel    Hemoglobin A1C    Lipid Panel   2. Cough  albuterol sulfate HFA (PROAIR HFA) 108 (90 Base) MCG/ACT inhaler   3. Mild intermittent asthma with acute exacerbation  albuterol sulfate HFA (PROAIR HFA) 108 (90 Base) MCG/ACT inhaler   4. Hyperlipidemia, unspecified hyperlipidemia type  CBC Auto Differential    ALT    AST    Basic Metabolic Panel    Hemoglobin A1C    Lipid Panel   5. Hypertension, unspecified type  CBC Auto Differential    ALT    AST    Basic Metabolic Panel    Hemoglobin A1C    Lipid Panel   6. Fatty liver disease, nonalcoholic      improved       PLAN:  We review that her labs overall look great. Her A1C is great at 5.4. Her cholesterol is the only one that is slightly elevated. She discusses her vandana of having her first grandson.  She mentions that her daughter had a traumatic birthing experience in [place]. Preeclampsia     This is the healthiest she has been in a while. She mentions she is doing PT for her prolapsed bladder. I will see her back in 6 months with labs prior. I, Dr. Mariposa Plaza, personally performed the services described in this documentation as scribed/transcribed by NATHALIA Bojorquez in my presence, and is both accurate and complete.

## 2022-03-01 RX ORDER — DULAGLUTIDE 0.75 MG/.5ML
INJECTION, SOLUTION SUBCUTANEOUS
Qty: 2 ML | Refills: 3 | Status: SHIPPED | OUTPATIENT
Start: 2022-03-01 | End: 2022-05-20

## 2022-04-04 RX ORDER — METFORMIN HYDROCHLORIDE 500 MG/1
TABLET, EXTENDED RELEASE ORAL
Qty: 90 TABLET | Refills: 1 | Status: SHIPPED | OUTPATIENT
Start: 2022-04-04 | End: 2022-08-29 | Stop reason: SDUPTHER

## 2022-04-04 RX ORDER — EZETIMIBE 10 MG/1
TABLET ORAL
Qty: 90 TABLET | Refills: 1 | Status: SHIPPED | OUTPATIENT
Start: 2022-04-04 | End: 2022-08-29 | Stop reason: SDUPTHER

## 2022-05-20 RX ORDER — DULAGLUTIDE 0.75 MG/.5ML
INJECTION, SOLUTION SUBCUTANEOUS
Qty: 2 ML | Refills: 3 | Status: SHIPPED | OUTPATIENT
Start: 2022-05-20 | End: 2022-08-09

## 2022-05-28 ENCOUNTER — APPOINTMENT (OUTPATIENT)
Dept: CT IMAGING | Age: 47
End: 2022-05-28
Payer: COMMERCIAL

## 2022-05-28 ENCOUNTER — HOSPITAL ENCOUNTER (EMERGENCY)
Age: 47
Discharge: HOME | End: 2022-05-28
Attending: EMERGENCY MEDICINE
Payer: COMMERCIAL

## 2022-05-28 VITALS
SYSTOLIC BLOOD PRESSURE: 142 MMHG | DIASTOLIC BLOOD PRESSURE: 94 MMHG | HEART RATE: 97 BPM | RESPIRATION RATE: 18 BRPM | TEMPERATURE: 97.7 F | OXYGEN SATURATION: 96 %

## 2022-05-28 DIAGNOSIS — H54.7 ALTERATION IN VISION: Primary | ICD-10-CM

## 2022-05-28 DIAGNOSIS — H43.391 FLOATERS IN VISUAL FIELD, RIGHT: ICD-10-CM

## 2022-05-28 DIAGNOSIS — R00.2 PALPITATIONS: ICD-10-CM

## 2022-05-28 LAB
ABSOLUTE EOS #: 0.14 K/UL (ref 0–0.44)
ABSOLUTE IMMATURE GRANULOCYTE: <0.03 K/UL (ref 0–0.3)
ABSOLUTE LYMPH #: 1.8 K/UL (ref 1.1–3.7)
ABSOLUTE MONO #: 0.56 K/UL (ref 0.1–1.2)
ANION GAP SERPL CALCULATED.3IONS-SCNC: 12 MMOL/L (ref 9–17)
BASOPHILS # BLD: 0 % (ref 0–2)
BASOPHILS ABSOLUTE: <0.03 K/UL (ref 0–0.2)
BUN BLDV-MCNC: 10 MG/DL (ref 6–20)
BUN/CREAT BLD: 16 (ref 9–20)
CALCIUM SERPL-MCNC: 10 MG/DL (ref 8.6–10.4)
CHLORIDE BLD-SCNC: 102 MMOL/L (ref 98–107)
CO2: 22 MMOL/L (ref 20–31)
CREAT SERPL-MCNC: 0.64 MG/DL (ref 0.5–0.9)
EKG ATRIAL RATE: 86 BPM
EKG P AXIS: 21 DEGREES
EKG P-R INTERVAL: 136 MS
EKG Q-T INTERVAL: 366 MS
EKG QRS DURATION: 74 MS
EKG QTC CALCULATION (BAZETT): 437 MS
EKG R AXIS: 29 DEGREES
EKG T AXIS: 56 DEGREES
EKG VENTRICULAR RATE: 86 BPM
EOSINOPHILS RELATIVE PERCENT: 2 % (ref 1–4)
GFR AFRICAN AMERICAN: >60 ML/MIN
GFR NON-AFRICAN AMERICAN: >60 ML/MIN
GFR SERPL CREATININE-BSD FRML MDRD: ABNORMAL ML/MIN/{1.73_M2}
GFR SERPL CREATININE-BSD FRML MDRD: ABNORMAL ML/MIN/{1.73_M2}
GLUCOSE BLD-MCNC: 102 MG/DL (ref 70–99)
HCT VFR BLD CALC: 42.3 % (ref 36.3–47.1)
HEMOGLOBIN: 14.5 G/DL (ref 11.9–15.1)
IMMATURE GRANULOCYTES: 0 %
LYMPHOCYTES # BLD: 31 % (ref 24–43)
MAGNESIUM: 2.3 MG/DL (ref 1.6–2.6)
MCH RBC QN AUTO: 32.9 PG (ref 25.2–33.5)
MCHC RBC AUTO-ENTMCNC: 34.3 G/DL (ref 28.4–34.8)
MCV RBC AUTO: 95.9 FL (ref 82.6–102.9)
MONOCYTES # BLD: 10 % (ref 3–12)
NRBC AUTOMATED: 0 PER 100 WBC
PDW BLD-RTO: 12.6 % (ref 11.8–14.4)
PLATELET # BLD: 194 K/UL (ref 138–453)
PMV BLD AUTO: 9.7 FL (ref 8.1–13.5)
POTASSIUM SERPL-SCNC: 4.4 MMOL/L (ref 3.7–5.3)
RBC # BLD: 4.41 M/UL (ref 3.95–5.11)
SEG NEUTROPHILS: 57 % (ref 36–65)
SEGMENTED NEUTROPHILS ABSOLUTE COUNT: 3.23 K/UL (ref 1.5–8.1)
SODIUM BLD-SCNC: 136 MMOL/L (ref 135–144)
TSH SERPL DL<=0.05 MIU/L-ACNC: 2 UIU/ML (ref 0.3–5)
WBC # BLD: 5.8 K/UL (ref 3.5–11.3)

## 2022-05-28 PROCEDURE — 70496 CT ANGIOGRAPHY HEAD: CPT

## 2022-05-28 PROCEDURE — 93243 EXT ECG>48HR<7D SCAN A/R: CPT

## 2022-05-28 PROCEDURE — 93010 ELECTROCARDIOGRAM REPORT: CPT | Performed by: INTERNAL MEDICINE

## 2022-05-28 PROCEDURE — 93005 ELECTROCARDIOGRAM TRACING: CPT | Performed by: EMERGENCY MEDICINE

## 2022-05-28 PROCEDURE — 6360000004 HC RX CONTRAST MEDICATION: Performed by: EMERGENCY MEDICINE

## 2022-05-28 PROCEDURE — 85025 COMPLETE CBC W/AUTO DIFF WBC: CPT

## 2022-05-28 PROCEDURE — 6370000000 HC RX 637 (ALT 250 FOR IP): Performed by: EMERGENCY MEDICINE

## 2022-05-28 PROCEDURE — 83735 ASSAY OF MAGNESIUM: CPT

## 2022-05-28 PROCEDURE — 93242 EXT ECG>48HR<7D RECORDING: CPT

## 2022-05-28 PROCEDURE — 70450 CT HEAD/BRAIN W/O DYE: CPT

## 2022-05-28 PROCEDURE — 80048 BASIC METABOLIC PNL TOTAL CA: CPT

## 2022-05-28 PROCEDURE — 93272 ECG/REVIEW INTERPRET ONLY: CPT | Performed by: INTERNAL MEDICINE

## 2022-05-28 PROCEDURE — 99285 EMERGENCY DEPT VISIT HI MDM: CPT

## 2022-05-28 PROCEDURE — 84443 ASSAY THYROID STIM HORMONE: CPT

## 2022-05-28 PROCEDURE — 36415 COLL VENOUS BLD VENIPUNCTURE: CPT

## 2022-05-28 RX ORDER — TETRACAINE HYDROCHLORIDE 5 MG/ML
1 SOLUTION OPHTHALMIC ONCE
Status: COMPLETED | OUTPATIENT
Start: 2022-05-28 | End: 2022-05-28

## 2022-05-28 RX ADMIN — TETRACAINE HYDROCHLORIDE 1 DROP: 5 SOLUTION OPHTHALMIC at 07:44

## 2022-05-28 RX ADMIN — IOPAMIDOL 75 ML: 755 INJECTION, SOLUTION INTRAVENOUS at 08:53

## 2022-05-28 NOTE — ED PROVIDER NOTES
[address] [facility] [place] ED  EMERGENCY DEPARTMENT ENCOUNTER      Pt Name: Jock Hodgkin  MRN: [id number]  [place] 1975  Date of evaluation: 5/28/2022  Provider: Shelli Polo MD    CHIEF COMPLAINT       Chief Complaint   Patient presents with    Eye Problem     right eye--states she has floaters ongoing for 2 weeks and has a dark ring that is bright on the outside of it and can see through that ring. 2 weeks ago she had an occular migrain and had a large blind spot         HISTORY OF PRESENT ILLNESS   (Location/Symptom, Timing/Onset, Context/Setting, Quality, Duration, Modifying Factors, Severity)  Note limiting factors. Jock Hodgkin is a 52 y.o. female who presents to the emergency department      80-year-old female presents to the emergency department for evaluation of eye problems for 2 weeks. Patient states the symptoms began approximately 2 weeks ago. She was at work and developed a mild frontal headache. Did take some ibuprofen and the headache improved. She reports shortly following the headache that she noticed dark spot in her vision. States in her right eye she had an area where she could not seen in the upper medial aspect of her visual field. Left eye was unaffected. This resolved after an hour or so. Since then the patient reports that she has had some floaters. Has not had any headaches since then. She did not have any other visual disturbances until this morning. Patient states that this morning she is noted flashes and halos with movement of her right eye. Denies any eye pain. She no longer has any visual loss other than the first episode that resolved 2 weeks ago. Denies any slurred speech. She has not had any headaches since her headache 2 weeks ago. No weakness numbness tingling or other focal neurological deficits. She does report that is having COVID she has been having intermittent episodes of palpitations several times a week. Not been evaluated for this.   Denies any history of syncope or presyncope. She is denying any chest pain. Noted to be hypertensive in the ED. States that she has not had any problems with hypertension before. She is under a significant amount of stress at this time since her daughter is getting  and she has a family member who is hospitalized in the ICU with COVID. Blood pressure is improving with rest.  Does have a history of asthma and type 2 diabetes. Nursing Notes were reviewed. REVIEW OF SYSTEMS    (2-9 systems for level 4, 10 or more for level 5)     Review of Systems   All other systems reviewed and are negative. Except as noted above the remainder of the review of systems was reviewed and negative.        PAST MEDICAL HISTORY     Past Medical History:   Diagnosis Date    Allergic rhinitis 1995    Asthma 2011    Elevated liver enzymes 2011    Fatty liver disease, nonalcoholic 8/91/8351    Rufus's syndrome     Hyperlipidemia 2011    Hypertension 2011    h/o now controlled    Migraine     Obesity due to excess calories 11/17/2017    PIH (pregnancy induced hypertension) 2008    Pneumonia 2009    h/o    Urinary incontinence          SURGICAL HISTORY       Past Surgical History:   Procedure Laterality Date    CYSTOURETHROSCOPY  5/2014    Dr. Ashby Boas  [facility]    Hysteroscopy novasure ablation anterior repair and urethral sling, Dr. Braga Kim       Discharge Medication List as of 5/28/2022 10:05 AM      CONTINUE these medications which have NOT CHANGED    Details   TRULICITY 1.91 HR/8.3AW SOPN INJECT THE CONTENTS OF ONE SYRINGE UNDER THE SKIN ONCE WEEKLY, Disp-2 mL, R-3Normal      metFORMIN (GLUCOPHAGE-XR) 500 MG extended release tablet TAKE 1 TABLET BY MOUTH ONE TIME A DAY WITH BREAKFAST, Disp-90 tablet, R-1Normal      ezetimibe (ZETIA) 10 MG tablet TAKE 1 TABLET BY MOUTH ONE TIME A DAY, Disp-90 tablet, R-1Normal      albuterol sulfate HFA (PROAIR HFA) 108 (90 Base) MCG/ACT inhaler Inhale 2 puffs into the lungs every 6 hours as needed for Wheezing, Disp-1 each, R-3Normal      famotidine (PEPCID) 20 MG tablet Take 20 mg by mouth 2 times daily Historical Med      sertraline (ZOLOFT) 50 MG tablet Take 1 tablet by mouth daily, Disp-90 tablet, R-3Normal      Lancets 28G MISC 2 TIMES DAILY Starting Wed 4/7/2021, Disp-200 each, R-3, NormalPRODIGY LANCETS      blood glucose monitor kit and supplies 1 kit by Other route 2 times daily Dispense prodigy meter per insurance coverage, Other, 2 TIMES DAILY Starting Mon 4/5/2021, Disp-1 kit, R-0, Normal      blood glucose monitor strips Test 2 times a day & as needed for symptoms of irregular blood glucose. Dispense sufficient amount for indicated testing frequency plus additional to accommodate PRN testing needs also, dispense prodigy test strips per insurance coverage, Disp-300 strip,  R-3, Normal      Cholecalciferol (VITAMIN D) 50 MCG (2000 UT) CAPS capsule Take 4,000 Units by mouth daily, Disp-30 capsule,R-0Normal      Multiple Vitamins-Minerals (EQ MULTIVITAMINS ADULT GUMMY PO) Take by mouth dailyHistorical Med      loratadine (CLARITIN) 10 MG tablet Take 10 mg by mouth as needed       Spacer/Aero-Holding Chambers (OPTICHAMBER ROBBY) NATANAEL DAILY Starting 10/28/2015, Until Discontinued, Disp-1 Device, R-0, Normal      docusate sodium (COLACE) 100 MG capsule Take 1 capsule by mouth daily as needed for Constipation. , Disp-30 capsule, R-0             ALLERGIES     Latex    FAMILY HISTORY       Family History   Problem Relation Age of Onset    Hypertension Mother     Diabetes Mother     Heart Disease Father 48    High Blood Pressure Father     High Cholesterol Father     High Blood Pressure Sister     Cancer Maternal Grandmother         colon    Heart Disease Maternal Grandmother     Breast Cancer Maternal Grandmother     Heart Surgery Maternal Grandfather     Heart Disease Maternal Grandfather     Cancer Paternal Grandmother     Heart Disease Paternal Grandfather           SOCIAL HISTORY       Social History     Socioeconomic History    Marital status:      Spouse name: None    Number of children: None    Years of education: None    Highest education level: None   Occupational History    None   Tobacco Use    Smoking status: Former Smoker     Types: Cigarettes    Smokeless tobacco: Never Used    Tobacco comment: was a social smoker previously   Vaping Use    Vaping Use: Never used   Substance and Sexual Activity    Alcohol use: Yes     Alcohol/week: 0.0 standard drinks     Comment: occ    Drug use: No    Sexual activity: Yes     Partners: Male     Comment: ablation    Other Topics Concern    None   Social History Narrative    None     Social Determinants of Health     Financial Resource Strain:     Difficulty of Paying Living Expenses: Not on file   Food Insecurity:     Worried About Running Out of Food in the Last Year: Not on file    Marco of Food in the Last Year: Not on file   Transportation Needs:     Lack of Transportation (Medical): Not on file    Lack of Transportation (Non-Medical):  Not on file   Physical Activity:     Days of Exercise per Week: Not on file    Minutes of Exercise per Session: Not on file   Stress:     Feeling of Stress : Not on file   Social Connections:     Frequency of Communication with Friends and Family: Not on file    Frequency of Social Gatherings with Friends and Family: Not on file    Attends [employer] Services: Not on file    Active Member of Clubs or Organizations: Not on file    Attends Club or Organization Meetings: Not on file    Marital Status: Not on file   Intimate Partner Violence:     Fear of Current or Ex-Partner: Not on file    Emotionally Abused: Not on file    Physically Abused: Not on file    Sexually Abused: Not on file   Housing Stability:     Unable to Pay for Housing in the Last Year: Not on file    Number of Places Lived in the Last Year: Not on file    Unstable Housing in the Last Year: Not on file       SCREENINGS        [place] Coma Scale  Eye Opening: Spontaneous  Best Verbal Response: Oriented  Best Motor Response: Obeys commands  [place] Coma Scale Score: 15               PHYSICAL EXAM    (up to 7 for level 4, 8 or more for level 5)     ED Triage Vitals   BP Temp Temp Source Heart Rate Resp SpO2 Height Weight   05/28/22 0722 05/28/22 0725 05/28/22 0725 05/28/22 0725 05/28/22 0725 05/28/22 0725 -- --   (!) 191/110 97.7 °F (36.5 °C) Tympanic 97 18 98 %         Physical Exam  Vitals and nursing note reviewed. Constitutional:       General: She is not in acute distress. Appearance: She is not toxic-appearing. Eyes:      Extraocular Movements: Extraocular movements intact. Conjunctiva/sclera: Conjunctivae normal.      Pupils: Pupils are equal, round, and reactive to light. Comments: Intraocular pressure 9 with Hay-Pen  Limited to nondilated funduscopic exam without significant findings  No visual field defects noted on exam   Neurological:      Mental Status: She is alert. DIAGNOSTIC RESULTS     EKG: All EKG's are interpreted by the Emergency Department Physician who either signs or Co-signs this chart in the absence of a cardiologist.    Sinus rhythm rate of 86 bpm.  Normal conduction. Normal intervals. No acute ST segment or T wave findings. No acute findings of ischemia or infarction    RADIOLOGY:   Non-plain film images such as CT, Ultrasound and MRI are read by the radiologist. Plain radiographic images are visualized and preliminarily interpreted by the emergency physician with the below findings:        Interpretation per the Radiologist below, if available at the time of this note:    CT Head WO Contrast   Preliminary Result   1. No acute intracranial abnormality. 2. Unremarkable CTA of the head and neck.          CTA HEAD NECK W CONTRAST   Preliminary Result   1. No acute intracranial abnormality. 2. Unremarkable CTA of the head and neck. ED BEDSIDE ULTRASOUND:   Performed by ED Physician - none    LABS:  Labs Reviewed   BASIC METABOLIC PANEL W/ REFLEX TO MG FOR LOW K - Abnormal; Notable for the following components:       Result Value    Glucose 102 (*)     All other components within normal limits   CBC WITH AUTO DIFFERENTIAL   TSH WITH REFLEX   MAGNESIUM       All other labs were within normal range or not returned as of this dictation. EMERGENCY DEPARTMENT COURSE and DIFFERENTIAL DIAGNOSIS/MDM:   Vitals:    Vitals:    05/28/22 0747 05/28/22 0800 05/28/22 1026 05/28/22 1028   BP: (!) 165/107 (!) 141/80 (!) 152/98 (!) 142/94   Pulse:       Resp:       Temp:       TempSrc:       SpO2: 97% 96%             MDM  Number of Diagnoses or Management Options  Alteration in vision  Palpitations  Diagnosis management comments: 57-year-old female presented to emergency department for evaluation of recent visual disturbances brief episode of partial visual field loss 2 weeks ago which resolved now having floaters in today flashers in addition to her floaters. No eye pain. No other focal neurological deficits. Has had frequent intermittent episodes of heart palpitations since having COVID. She has not been evaluated by cardiology or ophthalmology. Laboratory studies EKG and CT scans unremarkable. Patient will be set up with a [facility] today and will follow up with cardiology. Ophthalmology follow-up contact info given and she is instructed to call Tuesday morning to schedule the earliest available appointment. ED return and follow-up instructions discussed prior to discharge. MIPS       REASSESSMENT          CRITICAL CARE TIME   Total Critical Care time was  minutes, excluding separately reportable procedures.   There was a high probability of clinically significant/life threatening deterioration in the patient's condition which required my urgent intervention. CONSULTS:  None    PROCEDURES:  Unless otherwise noted below, none     Procedures        FINAL IMPRESSION      1. Alteration in vision    2. Floaters in visual field, right    3. Palpitations          DISPOSITION/PLAN   DISPOSITION Decision To Discharge 05/28/2022 09:53:38 AM      PATIENT REFERRED TO:  Tulio Silva DO  [address] [place] [id number]  [phone]      Call Tuesday morning to schedule the earliest available appointment    Ashli Pascual MD  [address] [place] [address] [address] Linnette Garvey [place] [address] [phone]      Call Tuesday to schedule the earliest available appointment    [address]  [phone]          DISCHARGE MEDICATIONS:  Discharge Medication List as of 5/28/2022 10:05 AM        Controlled Substances Monitoring:     No flowsheet data found.     (Please note that portions of this note were completed with a voice recognition program.  Efforts were made to edit the dictations but occasionally words are mis-transcribed.)    Leander Villarreal MD (electronically signed)  Attending Emergency Physician            Leander Villarreal MD  05/28/22 1934

## 2022-05-28 NOTE — Clinical Note
Shahana Ibarra was seen and treated in our emergency department on 5/28/2022. She may return to work on 05/29/2022. If you have any questions or concerns, please don't hesitate to call.       Anne Marie Webb MD

## 2022-06-09 NOTE — PROCEDURES
[address] [place], [place] [address]                                 EVENT MONITOR    PATIENT NAME: Niyah Martinez                     :        1975  MED REC NO:   [id number]                              ROOM:         ACCOUNT NO:   [de-identified]                           ADMIT DATE: 2022  PROVIDER:     Heather Edgar MD    CARDIOVASCULAR DIAGNOSTIC DEPARTMENT    DATE OF STUDY:  2022    ORDERING PROVIDER:  Nay Gillespie MD    PRIMARY CARE PROVIDER:  Marissa Hernandez MD    INTERPRETING PHYSICIAN:  Heather Edgar MD    DIAGNOSIS:  Palpitations. PHYSICIAN INTERPRETATION:  1.  4 days and 22 hours recorded. 2.  Baseline rhythm is sinus with average heart rate of 87 bpm, ranging  between 58 and 141 bpm.  3.  Sinus tachycardia represented 12% of the study duration. 4.  Atrial tachycardia:  2 episodes. Longest/fastest, 3 beats at  average 112 bpm up to 126 bpm.  5.  PAC 0.02%. 6.  PVC 0.47%. 7.  Patient-activated events correlated with isolated PVCs.         Berhane Gordon MD    D: 2022 12:52:42       T: 2022 12:53:49     LUDMILA/DYLAN_VANITA  Job#: [id number]     Doc#: Unknown    CC:  Marissa Hernandez

## 2022-06-20 ENCOUNTER — OFFICE VISIT (OUTPATIENT)
Dept: CARDIOLOGY | Age: 47
End: 2022-06-20
Payer: COMMERCIAL

## 2022-06-20 VITALS
RESPIRATION RATE: 18 BRPM | HEART RATE: 84 BPM | SYSTOLIC BLOOD PRESSURE: 124 MMHG | HEIGHT: 66 IN | WEIGHT: 189 LBS | OXYGEN SATURATION: 98 % | DIASTOLIC BLOOD PRESSURE: 86 MMHG | BODY MASS INDEX: 30.37 KG/M2

## 2022-06-20 DIAGNOSIS — I10 PRIMARY HYPERTENSION: Primary | ICD-10-CM

## 2022-06-20 DIAGNOSIS — Z86.16 HISTORY OF COVID-19: ICD-10-CM

## 2022-06-20 DIAGNOSIS — R07.89 NON-CARDIAC CHEST PAIN: ICD-10-CM

## 2022-06-20 DIAGNOSIS — E78.2 MIXED HYPERLIPIDEMIA: ICD-10-CM

## 2022-06-20 DIAGNOSIS — Z82.49 FAMILY HISTORY OF HEART DISEASE: ICD-10-CM

## 2022-06-20 DIAGNOSIS — E11.9 DIABETES MELLITUS TYPE 2 WITHOUT RETINOPATHY (HCC): ICD-10-CM

## 2022-06-20 DIAGNOSIS — I49.3 FREQUENT PVCS: ICD-10-CM

## 2022-06-20 DIAGNOSIS — H43.393 VITREOUS FLOATERS OF BOTH EYES: ICD-10-CM

## 2022-06-20 DIAGNOSIS — R00.2 PALPITATION: ICD-10-CM

## 2022-06-20 PROCEDURE — 99204 OFFICE O/P NEW MOD 45 MIN: CPT | Performed by: PHYSICIAN ASSISTANT

## 2022-06-20 PROCEDURE — 3044F HG A1C LEVEL LT 7.0%: CPT | Performed by: PHYSICIAN ASSISTANT

## 2022-06-20 RX ORDER — ATORVASTATIN CALCIUM 10 MG/1
10 TABLET, FILM COATED ORAL DAILY
Qty: 90 TABLET | Refills: 3 | Status: SHIPPED | OUTPATIENT
Start: 2022-06-20

## 2022-06-20 NOTE — PROGRESS NOTES
I, Yuniortaina Magaña am scribing for and in the presence of Clay Johnson PA-C. Patient: Tani Edwards  : 1975  Date of Visit: 2022    REASON FOR VISIT / CONSULTATION: Establish Cardiologist (HX:palp Pt was in ER on  for palp and floaters that started with an occular migraine a couple weeks before. She now seeing giant floaters and flashes was told it was vitural detachment BP was high she was under a lot of stress daughter was getting , work and grandparent dying. Had COVID in ) and Palpitations (seen eye Dr. she sees black dots she does have musckular pain with touch.lightheaded/dizziness with palp  Denies:SOB )      History of Present Illness:        Dear Sandra Jeffers MD    I had the pleasure of seeing Tani Edwards in my office today. Ms. Dania Mancia is a 52 y.o. female with a history of palpitations. Her father has history of stents in  in his late 42's and a mild heart attack when he was in his 63's requiring additional stent placement. Her mother and sister also has hyperlipemia and hypertension. Ms. Dania Mancia was diagnosed over 5 years with hypertension and hyperlipidemia. She is also prediabetic for about a year, she is now on metformin and trulicity. She had 3 children and had hypertension with her pregnancy. She did have some intolerance to some statins in the past. She has been an occasional social smoker on the weekends, never routine. EKG done 2022: Normal sinus rhythm    CAM monitor done on 2022:  1.  4 days and 22 hours recorded. 2.  Baseline rhythm is sinus with average heart rate of 87 bpm, ranging  between 58 and 141 bpm.  3.  Sinus tachycardia represented 12% of the study duration. 4.  Atrial tachycardia:  2 episodes. Longest/fastest, 3 beats at  average 112 bpm up to 126 bpm.  5.  PAC 0.02%. 6.  PVC 0.47%. 7.  Patient-activated events correlated with isolated PVCs. Ms. Dania Mancia is here to establish care following an ER visit on 2022. She states she was having flashes and migraine that started May 9. She woke up 5/28 with black spot and flashes felt palpitations and high blood pressure. She did go see opthamatrist that cleared her, no retina detachment at that time. She reports she has chest muscular discomfort, she describes it as a tightness. She states if she gets anxious she feels tightness, that lasts minutes. The chest tightness does not travel, denies worsening with exertion. She does mention when she has heartburn she does have pain in jaw, Tums and antiacids do help and the pain goes away. She does feel off when she has palpitations, she states its not a lightheaded or dizziness but she feels spacey. She states her palpitations happen daily, randomly, and are mildly bothersome but does not limit her. Denies ever passing out or near passing out. She had COVID 12/2020. Denies cough, fever or chills. Denies any blood in urine or stools. Ms. Arturo Petit denies any chest pain now, increased shortness of breath, abdominal pain, bleeding problems, problems with her medications or any other concerns at this time. Exercise Tolerance: Ms. Arturo Petit reports that she has a fair exercise tolerance. Her says that she could walk 1 mile without developing chest discomfort or significant shortness of breath. PAST MEDICAL HISTORY:        Past Medical History:   Diagnosis Date    Allergic rhinitis 01/01/1995    Asthma 01/01/2011    COVID-19 12/2020    Elevated liver enzymes 01/01/2011    Fatty liver disease, nonalcoholic 87/31/3197    Rufus's syndrome     Hyperlipidemia 01/01/2011    Hypertension 01/01/2011    h/o now controlled    Migraine     Obesity due to excess calories 11/17/2017    PIH (pregnancy induced hypertension) 01/01/2008    Pneumonia 01/01/2009    h/o    Urinary incontinence        CURRENT ALLERGIES: Latex REVIEW OF SYSTEMS: 14 systems were reviewed. Pertinent positives and negatives as above, all else negative. Past Surgical History:   Procedure Laterality Date    CYSTOURETHROSCOPY  5/2014    Dr. Abhijeet Donis  5-13-14 [facility]    Hysteroscopy novasure ablation anterior repair and urethral sling, Dr. Rincon Stain EXTRACTION      Social History:  Social History     Tobacco Use    Smoking status: Former Smoker     Types: Cigarettes    Smokeless tobacco: Never Used    Tobacco comment: was a social smoker previously   Vaping Use    Vaping Use: Never used   Substance Use Topics    Alcohol use: Yes     Alcohol/week: 0.0 standard drinks     Comment: occ    Drug use: No        CURRENT MEDICATIONS:        Outpatient Medications Marked as Taking for the 6/20/22 encounter (Office Visit) with Yogi Paulino PA-C   Medication Sig Dispense Refill    TRULICITY 7.52 IV/3.3TS SOPN INJECT THE CONTENTS OF ONE SYRINGE UNDER THE SKIN ONCE WEEKLY 2 mL 3    metFORMIN (GLUCOPHAGE-XR) 500 MG extended release tablet TAKE 1 TABLET BY MOUTH ONE TIME A DAY WITH BREAKFAST 90 tablet 1    ezetimibe (ZETIA) 10 MG tablet TAKE 1 TABLET BY MOUTH ONE TIME A DAY 90 tablet 1    albuterol sulfate HFA (PROAIR HFA) 108 (90 Base) MCG/ACT inhaler Inhale 2 puffs into the lungs every 6 hours as needed for Wheezing 1 each 3    famotidine (PEPCID) 20 MG tablet Take 20 mg by mouth 2 times daily       sertraline (ZOLOFT) 50 MG tablet Take 1 tablet by mouth daily 90 tablet 3    Lancets 28G MISC 1 Device by Does not apply route 2 times daily PRODIGY LANCETS 200 each 3    Cholecalciferol (VITAMIN D) 50 MCG (2000 UT) CAPS capsule Take 4,000 Units by mouth daily 30 capsule 0    Multiple Vitamins-Minerals (EQ MULTIVITAMINS ADULT GUMMY PO) Take by mouth daily      loratadine (CLARITIN) 10 MG tablet Take 10 mg by mouth as needed       docusate sodium (COLACE) 100 MG capsule Take 1 capsule by mouth daily as needed for Constipation.  30 capsule 0       FAMILY HISTORY: family history includes Breast Cancer in her maternal grandmother; Cancer in her maternal grandmother and paternal grandmother; Diabetes in her mother; Heart Disease in her maternal grandfather, maternal grandmother, and paternal grandfather; Heart Disease (age of onset: 48) in her father; Heart Surgery in her maternal grandfather; High Blood Pressure in her father and sister; High Cholesterol in her father, mother, and sister; Hypertension in her father, mother, and sister. Physical Examination:      BP (!) 138/91 (Site: Right Upper Arm, Position: Sitting, Cuff Size: Medium Adult)   Pulse 89   Resp 18   Ht 5' 6\" (1.676 m)   Wt 189 lb (85.7 kg)   SpO2 98%   BMI 30.51 kg/m²  Body mass index is 30.51 kg/m². Constitutional: She is oriented to person. She appears well-developed and well-nourished. In no acute distress. HEENT: Normocephalic and atraumatic. No JVD present. Carotid bruit is not present. No mass and no thyromegaly present. No lymphadenopathy present. Cardiovascular: Normal rate, regular rhythm, normal heart sounds. Exam reveals no gallop and no friction rubs. No murmur was heard. Pulmonary/Chest: Effort normal and breath sounds normal. No respiratory distress. She has no wheezes, rhonchi or rales. Abdominal: Soft, non-tender. Bowel sounds and aorta are normal. She exhibits no organomegaly, mass or bruit. Extremities: None. No cyanosis or clubbing. 2+ radial and carotid pulses. Distal extremity pulses: 2+ bilaterally. Neurological: She is alert and oriented to person. No evidence of gross cranial nerve deficit. Coordination appeared normal.   Skin: Skin is warm and dry. There is no rash or diaphoresis. Psychiatric: She has a normal mood and affect.  Her speech is normal and behavior is normal.      MOST RECENT LABS ON RECORD:   Lab Results   Component Value Date    WBC 5.8 05/28/2022    HGB 14.5 05/28/2022    HCT 42.3 05/28/2022     05/28/2022    CHOL 258 (H) 01/31/2022    TRIG 335 (H) 01/31/2022    HDL 52 01/31/2022    LDLDIRECT 200 (H) 09/28/2015    ALT 26 01/31/2022    AST 21 01/31/2022     05/28/2022    K 4.4 05/28/2022     05/28/2022    CREATININE 0.64 05/28/2022    BUN 10 05/28/2022    CO2 22 05/28/2022    TSH 2.00 05/28/2022    LABA1C 5.4 01/31/2022    LABMICR CANNOT BE CALCULATED 09/27/2021       ASSESSMENT:     1. Primary hypertension    2. Palpitation    3. Mixed hyperlipidemia    4. History of COVID-19    5. Vitreous floaters of both eyes    6. Frequent PVCs    7. Family history of heart disease    8. Non-cardiac chest pain       PLAN:         Essential Hypertension: Borderline controlled Recheck today was within normal range, I did ask her to continue monitoring her symptoms. · ACE Inibitor/ARB: Not indicated at this time. · Diuretics: Not indicated at this time. · Calcium Channel Blocker: Not indicated at this time. · Beta Blocker: Not indicated at this time. · Additional testing: Additional Testing List: I ordered a echocardiogram to better assess for the etiology of this problem and to help guide future management and I ordered a treadmill stress test WITHOUT imaging to try and rule out this possibility.   Recurrent intermittent palpitations: Rate Control Symptomatic    Reviewed her CAM monitor in detail today, average heart rate 84 bpm. Symptoms associated with PVC's. · Beta Blocker: Not indicated at this time. · Calcium Channel Blocker: Not indicated at this time. · Not indicated at this time. · Additional Testing List: I ordered a echocardiogram to better assess for the etiology of this problem and to help guide future management and I ordered a treadmill stress test WITHOUT imaging to try and rule out this possibility. · Hyperlipidemia: Mixed  · Cholesterol Reduction Therapy: Continue ezetimide (Zetia) 10 mg daily. ·  on 1/31/2022.   · Cholesterol Reduction Therapy: START Atorvastatin (Lipitor) 10 mg daily I discussed the potential benefits of statin therapy as well as the potential risks including myalgia as well as the rare but potentially serious complication of liver or kidney damage. Although rare, I told them that this could be serious and therefore told them to stop the medication immediately and call if they developed any severe muscle aches or pains and they agreed to do so. · I would like to repeat a lipid panel in 3 months, if cholesterol is well controlled we will consider stopping the Zetia. · Vitreous floaters and visual disturbances:  · Continue to follow up with Dr Karan Frankel as scheduled. · Educated her to monitor blood pressure and call if it is elevated. · Frequent PVC's:  · On CAM monitor  · Testing as above, palpitations are only mildly bothersome will monitor for now. · Family history of heart disease  · Father stens placed in his 42's and 63's  The 10-year ASCVD risk score (Antoinette Cui., et al., 2013) is: 2.8%    Values used to calculate the score:      Age: 52 years      Sex: Female      Is Non- : No      Diabetic: Yes      Tobacco smoker: No      Systolic Blood Pressure: 309 mmHg      Is BP treated: No      HDL Cholesterol: 52 mg/dL  ·     Total Cholesterol: 258 mg/dL    Most likely non cardiac chest pain, possibly GERD:  Despite the patient's intermittent chest discomfort, I believe these symptoms are relatively atypical in nature and therefore likely noncardiac. Given their relatively normal stress test, I honestly do not think that further workup for a cardiac source of their symptoms is likely indicated now and would suggest that a workup for an alternative cause of their symptoms be considered if clinically indicated.  Having said this, I did reiterate the reality that no test is 100% sensitive and therefore if symptoms persist, worsen and/or clinical suspicion for significant ischemic coronary artery disease remains high, further testing including the possibility of cardiac catheterization with coronary angiography may be appropriate to reconsider. Continue famotidine (Pepcid)    · Diabetes:  · Continue current treatment. · History of COVID-19  · Continue to monitor, she continues to have palpitations. · Echo as ordered above. In the meantime, I encouraged Ms. Melchor to continue to take her other medications. FOLLOW UP:   I told Ms. Melchor to call my office if she had any problems, but otherwise I asked her to Return in about 3 months (around 9/20/2022). However, I would be happy to see her sooner should the need arise. Sincerely,  Caroline Bay PA-C  [facility] Cardiology Specialist    [address], [address] [facility]  Phone: [phone], Fax: [phone]     I believe that the risk of significant morbidity and mortality related to the patient's current medical conditions are: intermediate-high. Approximately 50 minutes were spent during prep work, discussion and exam of the patient, and follow up documentation and all of their questions were answered. The documentation recorded by the scribe, accurately and completely reflects the services I personally performed and the decisions made by me.  Caroline Bay PA-C June 20, 2022

## 2022-06-20 NOTE — PATIENT INSTRUCTIONS
SURVEY:    You may be receiving a survey from [employer] regarding your visit today. Please complete the survey to enable us to provide the highest quality of care to you and your family. If you cannot score us a very good on any question, please call the office to discuss how we could have made your experience a very good one. Thank you.

## 2022-06-22 ENCOUNTER — OFFICE VISIT (OUTPATIENT)
Dept: PRIMARY CARE CLINIC | Age: 47
End: 2022-06-22
Payer: COMMERCIAL

## 2022-06-22 VITALS
BODY MASS INDEX: 29.41 KG/M2 | DIASTOLIC BLOOD PRESSURE: 74 MMHG | SYSTOLIC BLOOD PRESSURE: 126 MMHG | HEIGHT: 66 IN | WEIGHT: 183 LBS

## 2022-06-22 DIAGNOSIS — F41.9 ANXIETY: ICD-10-CM

## 2022-06-22 DIAGNOSIS — I10 PRIMARY HYPERTENSION: ICD-10-CM

## 2022-06-22 DIAGNOSIS — R00.2 PALPITATIONS: Primary | ICD-10-CM

## 2022-06-22 DIAGNOSIS — E11.9 TYPE 2 DIABETES MELLITUS WITHOUT COMPLICATION, WITHOUT LONG-TERM CURRENT USE OF INSULIN (HCC): ICD-10-CM

## 2022-06-22 PROCEDURE — 99214 OFFICE O/P EST MOD 30 MIN: CPT | Performed by: STUDENT IN AN ORGANIZED HEALTH CARE EDUCATION/TRAINING PROGRAM

## 2022-06-22 PROCEDURE — 3044F HG A1C LEVEL LT 7.0%: CPT | Performed by: STUDENT IN AN ORGANIZED HEALTH CARE EDUCATION/TRAINING PROGRAM

## 2022-06-22 RX ORDER — ALBUTEROL SULFATE 90 UG/1
2 AEROSOL, METERED RESPIRATORY (INHALATION) EVERY 6 HOURS PRN
COMMUNITY

## 2022-06-22 ASSESSMENT — PATIENT HEALTH QUESTIONNAIRE - PHQ9
2. FEELING DOWN, DEPRESSED OR HOPELESS: 0
SUM OF ALL RESPONSES TO PHQ QUESTIONS 1-9: 0
1. LITTLE INTEREST OR PLEASURE IN DOING THINGS: 0
SUM OF ALL RESPONSES TO PHQ QUESTIONS 1-9: 0
SUM OF ALL RESPONSES TO PHQ9 QUESTIONS 1 & 2: 0

## 2022-06-22 NOTE — PROGRESS NOTES
medication compliance addressed. Patient given educational materials: see patient instructions. HM - HM items completed today as per orders. Outstanding HM items though not limited to immunizations were discussed with the patient today, including risks, benefits and alternatives. The patient will discuss these during the next appointment per their preference. If there are any worsening or concerning signs or symptoms, patient will report to the ED and/or contact [id number] for immediate evaluation. Teach back method was used. All patient questions answered. Pt voiced understanding. Subjective    SUBJECTIVE/OBJECTIVE:    HPI     Establish Care, Diabetes, Hyperlipidemia, and Hypertension    HTN (chronic in the past) Palpitations  Patient is here for evaluation for a history of elevated blood pressures. Age at onset of elevated blood pressure: years ago (pre-eclampsia in the past, bp meds 1 month after that). Cardiac symptoms palpitations, dyspnea. Patient denies chest pain, chest pressure/discomfort, claudication, exertional chest pressure/discomfort, fatigue, lower extremity edema, near-syncope, syncope and tachypnea. Cardiovascular risk factors: diabetes mellitus, dyslipidemia, family history of premature cardiovascular disease and hypertension. Use of agents associated with hypertension: none. History of target organ damage: none. The patient is not on any medications at this time. The patient is following with Cardiology, Stress, ECHO. The patient is on ASA. The patient had attributed her recent elevated BP to increased amounts of stress. \"PHYSICIAN INTERPRETATION:  1.  4 days and 22 hours recorded. 2.  Baseline rhythm is sinus with average heart rate of 87 bpm, ranging  between 58 and 141 bpm.  3.  Sinus tachycardia represented 12% of the study duration. 4.  Atrial tachycardia:  2 episodes. Longest/fastest, 3 beats at  average 112 bpm up to 126 bpm.  5.  PAC 0.02%. 6.  PVC 0.47%.   7. Patient-activated events correlated with isolated PVCs. \"    Right Eye Problem  - Pt sees /Opthalmology. She had a dilated retina/procedure and things are ok at this time - f/u is scheduled in July 2022 - pt does not want any additional w/u at this time as she already has follow-up scheduled for management. Hyperlipidemia:  No new myalgias or GI upset on atorvastatin (Lipitor) (not started yet) and eztemibe (Zetia) (about 5 years). Medication compliance: compliant all of the time. Patient is  following a low fat, low cholesterol diet. The patient had tried fish oil supplementation in the past but it causes GI symptoms and stopped taking it. The patient did not tolerate statins in the past.    Lab Results   Component Value Date    CHOL 258 (H) 01/31/2022    TRIG 335 (H) 01/31/2022    HDL 52 01/31/2022    LDLDIRECT 200 (H) 09/28/2015     Lab Results   Component Value Date    ALT 26 01/31/2022    AST 21 01/31/2022        The 10-year ASCVD risk score (Cori Cobian, et al., 2013) is: 2.9%    Values used to calculate the score:      Age: 52 years      Sex: Female      Is Non- : No      Diabetic: Yes      Tobacco smoker: No      Systolic Blood Pressure: 566 mmHg      Is BP treated: No      HDL Cholesterol: 52 mg/dL      Total Cholesterol: 258 mg/dL    DIABETES MELLITUS:  Medication compliance:  compliant all of the time  Diabetic diet compliance:  compliant most of the time  Current exercise: no regular exercise  How long are you exercising during the week?  Work keeps her busy   Frequency of testing: none  Home blood sugar records: n/a  Any episodes of hypoglycemia? no  Eye exam current (within one year): unknown  Diabetic foot check in the past year No:   Meds - metformin and trulicity  Home blood sugar records: patient does not test  Any episodes of hypoglycemia? no  Fluctuating blood sugars?no  Weight trend: stable  Current diet: in general, a \"healthy\" diet  , well balanced  Current exercise: none/walking-while at work    Hgb A1c -   Lab Results   Component Value Date    LABA1C 5.4 01/31/2022     BP -   BP Readings from Last 1 Encounters:   06/22/22 126/74     Lipid Panel -   Lab Results   Component Value Date    HDL 52 01/31/2022    LDLDIRECT 200 09/28/2015    TRIG 335 01/31/2022     Is She on Statin? Yes  Is She on ACE inhibitor or angiotensin II receptor blocker? No  Is She on Aspirin? Yes  Eye exam current (within one year): yes  Foot exam current (within one year): no  Is She Smoker? No    Anxiety/Depression  Cooper Ruiz is a 52 y.o. female who presents for follow up of anxiety disorder and depression. She has the following anxiety symptoms: difficulty concentrating and racing thoughts. Symptoms have been stable since that time. She denies current suicidal and homicidal ideation. Family history significant for alcoholism, anxiety, depression and substance abuse. She complains of the following medication side effects: constipation (every other day is normal). The patient had been on Lexapro. The patient had been on Zoloft for a few years now. The patient had increased stressors recently which have resolved. reports that she has quit smoking. Her smoking use included cigarettes.  She has never used smokeless tobacco.         Family History   Problem Relation Age of Onset    High Cholesterol Mother     Hypertension Mother     Diabetes Mother     Hypertension Father     Heart Disease Father 48    High Blood Pressure Father     High Cholesterol Father     High Cholesterol Sister     Hypertension Sister     High Blood Pressure Sister     Cancer Maternal Grandmother         colon    Heart Disease Maternal Grandmother     Breast Cancer Maternal Grandmother     Heart Surgery Maternal Grandfather     Heart Disease Maternal Grandfather     Cancer Paternal Grandmother     Heart Disease Paternal Grandfather        Health Maintenance   Alcohol/Substance use History - None/Minimal  Smoking History    Tobacco Use      Smoking status: Former Smoker        Types: Cigarettes      Smokeless tobacco: Never Used      Tobacco comment: was a social smoker previously      Health Maintenance   Topic Date Due    Diabetic retinal exam  Never done    Hepatitis B vaccine (1 of 3 - Risk 3-dose series) Never done    Pneumococcal 0-64 years Vaccine (2 - PCV) 12/20/2012    Diabetic microalbuminuria test  09/27/2022    A1C test (Diabetic or Prediabetic)  01/31/2023    Lipids  01/31/2023    Diabetic foot exam  06/22/2023    Depression Screen  06/22/2023    Cervical cancer screen  10/31/2024    Colorectal Cancer Screen  02/14/2026    DTaP/Tdap/Td vaccine (2 - Td or Tdap) 10/03/2030    Flu vaccine  Completed    COVID-19 Vaccine  Completed    Hepatitis C screen  Completed    HIV screen  Completed    Hepatitis A vaccine  Aged Out    Hib vaccine  Aged Out    Meningococcal (ACWY) vaccine  Aged Out      Depression Screening  PHQ Scores 6/22/2022 2/1/2022 2/3/2021 3/9/2020 7/19/2019 5/18/2018 5/2/2017   PHQ2 Score 0 0 0 0 0 0 0   PHQ9 Score 0 0 0 0 0 0 0     Interpretation of Total Score Depression Severity: 1-4 = Minimal depression, 5-9 = Mild depression, 10-14 = Moderate depression, 15-19 = Moderately severe depression, 20-27 = Severe depression      Review of Systems   Constitutional: Negative for activity change, appetite change, chills, diaphoresis, fatigue, fever and unexpected weight change. HENT: Negative for sinus pressure, sinus pain, sore throat and trouble swallowing. Respiratory: Negative for cough, shortness of breath and wheezing. Cardiovascular: Negative for chest pain, palpitations and leg swelling. Gastrointestinal: Negative for abdominal pain, diarrhea, nausea and vomiting. Endocrine: Negative for cold intolerance, polydipsia, polyphagia and polyuria. Genitourinary: Negative for difficulty urinating, flank pain and frequency.    Musculoskeletal: Negative for gait problem and joint swelling. Negative for back pain, neck pain and neck stiffness. Skin: Negative for color change and wound. Negative for pallor and rash. Allergic/Immunologic: Negative for environmental allergies and food allergies. Neurological: Negative for light-headedness, numbness and headaches. Psychiatric/Behavioral: Negative for sleep disturbance. Hx of anxiety. Negative for confusion and suicidal ideas. Objective    Physical Exam   Constitutional: Patient is oriented to person, place, and time. Patient appears well-developed and well-nourished. No distress. HENT: Head: Normocephalic and atraumatic. Eyes: Pupils are equal, round, and reactive to light. Conjunctivae are normal. Right eye exhibits no discharge. Left eye exhibits no discharge. Cardiovascular: Normal rate, regular rhythm and normal heart sounds. Pulmonary/Chest: Effort normal and breath sounds normal. No respiratory distress. Patient has no wheezes. Abdominal: Soft. Bowel sounds are normal. Patient exhibits no distension. There is no tenderness. Musculoskeletal:  Patient exhibits no edema and tenderness. Patient exhibits no deformity. Neurological: Patient is alert and oriented to person, place, and time. Skin: Skin is warm and dry. Patient is not diaphoretic. Diabetic Foot Exam  Visual inspection:  Deformity/amputation: absent  Skin lesions/pre-ulcerative calluses: present, left foot sole and also right lateral foot  Edema: right- negative, left- negative    Sensory exam:  Monofilament sensation: normal  (minimum of 5 random plantar locations tested, avoiding callused areas - > 1 area with absence of sensation is + for neuropathy)    Plus at least one of the following:  Pulses: normal,   Pinprick: Intact  Proprioception: Intact  Vibration (128 Hz):  Intact    Psychiatric: Patient's speech is normal and behavior is normal. Thought content normal.   Mental Status: appearance:  appropriately dressed and healthy looking, behavior:  normal, attitude:  cooperative, speech:  appropriate, mood:  euthymic, affect:  congruent with mood, thought content:  no evidence of psychosis, thought process:  logical and coherent, orientation:  oriented in all spheres, memory:  recent:  good and remote:  good, insight:  good , judgment:  good  and cognitive:  intact  Vitals reviewed.     /74   Ht 5' 6\" (1.676 m)   Wt 183 lb (83 kg)   BMI 29.54 kg/m²   BP Readings from Last 3 Encounters:   06/22/22 126/74   06/20/22 124/86   05/28/22 (!) 142/94     Lab Results   Component Value Date    WBC 5.8 05/28/2022    HGB 14.5 05/28/2022    HCT 42.3 05/28/2022     05/28/2022    CHOL 258 (H) 01/31/2022    TRIG 335 (H) 01/31/2022    HDL 52 01/31/2022    LDLDIRECT 200 (H) 09/28/2015    ALT 26 01/31/2022    AST 21 01/31/2022     05/28/2022    K 4.4 05/28/2022     05/28/2022    CREATININE 0.64 05/28/2022    BUN 10 05/28/2022    CO2 22 05/28/2022    TSH 2.00 05/28/2022    LABA1C 5.4 01/31/2022    LABMICR CANNOT BE CALCULATED 09/27/2021     Lab Results   Component Value Date    CALCIUM 10.0 05/28/2022     Lab Results   Component Value Date    LDLCHOLESTEROL 139 (H) 01/31/2022    LDLDIRECT 200 (H) 09/28/2015       CURRENT MEDS W/ ASSOC DIAG         Start Date End Date     albuterol sulfate HFA (PROAIR HFA) 108 (90 Base) MCG/ACT inhaler  02/01/22  --     Inhale 2 puffs into the lungs every 6 hours as needed for Wheezing     Associated Diagnoses:  Cough, Mild intermittent asthma with acute exacerbation     atorvastatin (LIPITOR) 10 MG tablet  06/20/22  --     Take 1 tablet by mouth daily     Associated Diagnoses:  Mixed hyperlipidemia     blood glucose monitor kit and supplies  04/05/21  --     1 kit by Other route 2 times daily Dispense prodigy meter per insurance coverage     Associated Diagnoses:  Type 2 diabetes mellitus without complication, without long-term current use of insulin (HCC)     blood glucose monitor strips  04/05/21 --     Test 2 times a day & as needed for symptoms of irregular blood glucose. Dispense sufficient amount for indicated testing frequency plus additional to accommodate PRN testing needs also, dispense prodigy test strips per insurance coverage     Associated Diagnoses:  Type 2 diabetes mellitus without complication, without long-term current use of insulin ([facility])     Cholecalciferol (VITAMIN D) 50 MCG (2000 UT) CAPS capsule  12/02/20  --     Take 4,000 Units by mouth daily     Associated Diagnoses:  --     docusate sodium (COLACE) 100 MG capsule  05/13/14  --     Take 1 capsule by mouth daily as needed for Constipation.      Associated Diagnoses:  --     ezetimibe (ZETIA) 10 MG tablet  04/04/22  --     TAKE 1 TABLET BY MOUTH ONE TIME A DAY     Associated Diagnoses:  --     famotidine (PEPCID) 20 MG tablet  --  --     Associated Diagnoses:  --     Lancets 28G MISC  04/07/21  --     1 Device by Does not apply route 2 times daily PRODIGY LANCETS     Associated Diagnoses:  Type 2 diabetes mellitus without complication, without long-term current use of insulin ([facility])     loratadine (CLARITIN) 10 MG tablet  --  --     Associated Diagnoses:  --     metFORMIN (GLUCOPHAGE-XR) 500 MG extended release tablet  04/04/22  --     TAKE 1 TABLET BY MOUTH ONE TIME A DAY WITH BREAKFAST     Associated Diagnoses:  --     Multiple Vitamins-Minerals (EQ MULTIVITAMINS ADULT GUMMY PO)  --  --     Associated Diagnoses:  --     sertraline (ZOLOFT) 50 MG tablet  09/28/21  --     Take 1 tablet by mouth daily     Associated Diagnoses:  --     Spacer/Aero-Holding Chambers ([facility]) [address]  10/28/15  --     1 Device by Does not apply route daily     Patient not taking: Reported on 10/6/2021     Associated Diagnoses:  --     TRULICITY 1.74 TX/4.1CF SOPN  05/20/22  --     INJECT THE CONTENTS OF ONE SYRINGE UNDER THE SKIN ONCE WEEKLY     Associated Diagnoses:  --            Please note that this chart was generated using voice recognition Dragon dictation software. Although every effort was made to ensure the accuracy of this automated transcription, some errors in transcription may have occurred.      Electronically signed by Mercedes Mireles MD on 6/22/2022 at 8:54 AM

## 2022-08-08 ENCOUNTER — HOSPITAL ENCOUNTER (OUTPATIENT)
Dept: NON INVASIVE DIAGNOSTICS | Age: 47
Discharge: HOME | End: 2022-08-08
Payer: COMMERCIAL

## 2022-08-08 DIAGNOSIS — Z82.49 FAMILY HISTORY OF HEART DISEASE: ICD-10-CM

## 2022-08-08 DIAGNOSIS — E78.2 MIXED HYPERLIPIDEMIA: ICD-10-CM

## 2022-08-08 DIAGNOSIS — R00.2 PALPITATION: ICD-10-CM

## 2022-08-08 DIAGNOSIS — H43.393 VITREOUS FLOATERS OF BOTH EYES: ICD-10-CM

## 2022-08-08 DIAGNOSIS — I10 PRIMARY HYPERTENSION: ICD-10-CM

## 2022-08-08 DIAGNOSIS — I49.3 FREQUENT PVCS: ICD-10-CM

## 2022-08-08 DIAGNOSIS — Z86.16 HISTORY OF COVID-19: ICD-10-CM

## 2022-08-08 LAB
LV EF: 55 %
LVEF MODALITY: NORMAL

## 2022-08-08 PROCEDURE — 93306 TTE W/DOPPLER COMPLETE: CPT

## 2022-08-08 PROCEDURE — 93017 CV STRESS TEST TRACING ONLY: CPT

## 2022-08-09 ENCOUNTER — TELEPHONE (OUTPATIENT)
Dept: CARDIOLOGY | Age: 47
End: 2022-08-09

## 2022-08-09 DIAGNOSIS — R00.2 PALPITATION: ICD-10-CM

## 2022-08-09 DIAGNOSIS — R07.9 CHEST PAIN, UNSPECIFIED TYPE: ICD-10-CM

## 2022-08-09 DIAGNOSIS — E78.2 MIXED HYPERLIPIDEMIA: ICD-10-CM

## 2022-08-09 DIAGNOSIS — I10 PRIMARY HYPERTENSION: Primary | ICD-10-CM

## 2022-08-09 DIAGNOSIS — R94.31 ABNORMAL ECG DURING EXERCISE STRESS TEST: ICD-10-CM

## 2022-08-09 NOTE — PROCEDURES
[facility], [address] [place] [address]                              CARDIAC STRESS TEST    PATIENT NAME: Maico Shore                     :        1975  MED REC NO:   [id number]                              ROOM:  ACCOUNT NO:   [de-identified]                           ADMIT DATE: 2022  PROVIDER:     Sammie Franks MD    CARDIOVASCULAR DIAGNOSTIC DEPARTMENT    DATE OF STUDY:  2022    ORDERING PROVIDER:  Lazarus Grace, PA-C    PRIMARY CARE PROVIDER:  Huber Patterson MD    INTERPRETING PHYSICIAN:  Sammie Franks MD    EXERCISE STRESS TEST REPORT    Stress, exercise stress. INDICATIONS:  Assessment of a cardiac cause of palpitations. CLINICAL HISTORY:  The patient is a 42-year-old woman with no known  coronary artery disease. Previous cardiac history:  None. Other previous history includes chest pain, palpitations, family history  of coronary artery disease in father under age 61. Symptoms just prior to testing:  None. Relevant medications:  None. PROCEDURE:  The patient performed treadmill exercise using a Frederick  protocol, completing 9:44 minutes and completing an estimated workload  of 77.79 metabolic equivalents (METS). The test was terminated due to fatigue and shortness of breath. The heart rate was 96 beats per minute at baseline and increased to 157  beats at peak exercise, which was 90% of the maximum predicted heart  rate. The rest blood pressure was 136/70 mm/Hg and increased to 198/80  mm/Hg, which is a normal response. During the procedure, the patient  developed fatigue, shortness of breath and leg fatigue, but denied chest  discomfort. STRESS ECG RESULTS:  The resting electrocardiogram demonstrated normal  sinus rhythm without definitive ST-segment abnormalities suggestive of  myocardial ischemia.     At peak exercise and during recovery, the patient developed:    Upsloping ST segment

## 2022-08-09 NOTE — RESULT ENCOUNTER NOTE
Please let them know that their stress test was abnormal, I would like to have her do a stress test with imaging. Order has been placed please call to schedule. Please make follow up in next 1 to 2 weeks. Thanks.

## 2022-08-10 ENCOUNTER — TELEPHONE (OUTPATIENT)
Dept: CARDIOLOGY | Age: 47
End: 2022-08-10

## 2022-08-10 NOTE — TELEPHONE ENCOUNTER
----- Message from Dustin Skelton PA-C sent at 8/9/2022  4:37 PM EDT -----  Please let them know that their stress test was abnormal, I would like to have her do a stress test with imaging. Order has been placed please call to schedule. Please make follow up in next 1 to 2 weeks. Thanks.

## 2022-08-29 ENCOUNTER — HOSPITAL ENCOUNTER (OUTPATIENT)
Dept: NON INVASIVE DIAGNOSTICS | Age: 47
Discharge: HOME | End: 2022-08-29
Payer: COMMERCIAL

## 2022-08-29 DIAGNOSIS — E78.2 MIXED HYPERLIPIDEMIA: ICD-10-CM

## 2022-08-29 DIAGNOSIS — R00.2 PALPITATION: ICD-10-CM

## 2022-08-29 DIAGNOSIS — R94.31 ABNORMAL ECG DURING EXERCISE STRESS TEST: ICD-10-CM

## 2022-08-29 DIAGNOSIS — R07.9 CHEST PAIN, UNSPECIFIED TYPE: ICD-10-CM

## 2022-08-29 DIAGNOSIS — I10 PRIMARY HYPERTENSION: ICD-10-CM

## 2022-08-29 PROCEDURE — 93017 CV STRESS TEST TRACING ONLY: CPT

## 2022-08-29 PROCEDURE — 3430000000 HC RX DIAGNOSTIC RADIOPHARMACEUTICAL: Performed by: PHYSICIAN ASSISTANT

## 2022-08-29 PROCEDURE — A9500 TC99M SESTAMIBI: HCPCS | Performed by: PHYSICIAN ASSISTANT

## 2022-08-29 RX ORDER — EZETIMIBE 10 MG/1
10 TABLET ORAL DAILY
Qty: 90 TABLET | Refills: 1 | OUTPATIENT
Start: 2022-08-29

## 2022-08-29 RX ORDER — EZETIMIBE 10 MG/1
TABLET ORAL
Qty: 90 TABLET | Refills: 1 | Status: SHIPPED | OUTPATIENT
Start: 2022-08-29 | End: 2022-09-22

## 2022-08-29 RX ORDER — METFORMIN HYDROCHLORIDE 500 MG/1
TABLET, EXTENDED RELEASE ORAL
Qty: 90 TABLET | Refills: 1 | OUTPATIENT
Start: 2022-08-29

## 2022-08-29 RX ORDER — METFORMIN HYDROCHLORIDE 500 MG/1
500 TABLET, EXTENDED RELEASE ORAL
Qty: 90 TABLET | Refills: 1 | Status: SHIPPED | OUTPATIENT
Start: 2022-08-29

## 2022-08-29 RX ADMIN — Medication 30 MILLICURIE: at 09:31

## 2022-08-30 ENCOUNTER — HOSPITAL ENCOUNTER (OUTPATIENT)
Dept: NON INVASIVE DIAGNOSTICS | Age: 47
Discharge: HOME | End: 2022-08-30
Payer: COMMERCIAL

## 2022-08-30 PROCEDURE — 78452 HT MUSCLE IMAGE SPECT MULT: CPT

## 2022-08-30 PROCEDURE — 3430000000 HC RX DIAGNOSTIC RADIOPHARMACEUTICAL: Performed by: PHYSICIAN ASSISTANT

## 2022-08-30 PROCEDURE — A9500 TC99M SESTAMIBI: HCPCS | Performed by: PHYSICIAN ASSISTANT

## 2022-08-30 RX ADMIN — Medication 30 MILLICURIE: at 15:12

## 2022-08-31 ENCOUNTER — TELEPHONE (OUTPATIENT)
Dept: CARDIOLOGY | Age: 47
End: 2022-08-31

## 2022-08-31 NOTE — PROCEDURES
[address] [address]                              CARDIAC STRESS TEST    PATIENT NAME: Destinee Marmolejo                     :        1975  MED REC NO:   [id number]                              ROOM:  ACCOUNT NO:   [de-identified]                           ADMIT DATE: 2022  PROVIDER:     Cherelle Browne MD    CARDIOVASCULAR DIAGNOSTIC DEPARTMENT    DATE OF STUDY:  2022    ORDERING PROVIDER:  Toby Aceves PA-C    PRIMARY CARE PROVIDER:  Marialuisa Valenzuela MD    INTERPRETING PHYSICIAN:  Cherelle Browne MD    EXERCISE MYOCARDIAL PERFUSION STRESS TEST REPORT    Stress/rest single-isotope SPECT imaging with exercise stress and gated  SPECT imaging. INDICATION:  Assessment of recent chest pain and/or discomfort. Assessment of a cardiac cause of abnormal stress. CLINICAL HISTORY:  The patient is a 80-year-old woman with no known  coronary artery disease. Previous cardiac history includes stress test.    Other previous history includes chest pain, diabetes mellitus,  hypertension, family history of coronary artery disease in father under  age 61. Symptoms just prior to testing included back discomfort. Relevant medications:  None. PROCEDURE:  The patient performed treadmill exercise using a Frederick  protocol, completing 10:16 minutes and completing an estimated workload  of 13.36 metabolic equivalents (METS). The test was terminated due to fatigue and shortness of breath. The heart rate was 80 beats per minute at baseline and increased to 162  beats per minute at peak exercise, which was 93% of the maximum  predicted heart rate. The rest blood pressure was 128/76 mmHg and  increased to 200/80 mmHg, which is a normal response. During the  procedure, the patient developed fatigue, shortness of breath and leg  fatigue, but denied any chest discomfort.     Myocardial perfusion imaging: Imaging was performed at rest 30-45  minutes following the injection of 30 mCi of sestamibi. At peak  exercise, the patient was injected with 30 mCi of sestamibi and exercise  was continued for 1 minute. Gating post-stress tomographic imaging was  performed 30-45 minutes after stress. STRESS ECG RESULTS:  The resting electrocardiogram demonstrated normal  sinus rhythm without definitive ST-segment abnormalities suggestive of  myocardial ischemia. At peak exercise and during recovery, the patient developed:    Upsloping ST segment changes in lead(s) II, III, aVF, V4, V5 and V6,  which did meet diagnostic criteria for myocardial ischemia with no  premature atrial contractions (PACs) and no premature ventricular  contractions (PVCs). NUCLEAR IMAGING RESULTS:  The overall quality of the study is fair. No  significant attenuation artifact was seen. There is no evidence of  abnormal lung uptake. Additionally, the right ventricle appears normal.  The left ventricular cavity is noted to be normal in size on the stress  images. There is no evidence of transient ischemic dilatation (TID) of  the left ventricle. Gated SPECT imaging reveals normal myocardial thickening and wall motion  with a calculated left ventricular ejection fraction of 77%. The rest images demonstrate homogeneous tracer distribution throughout  the myocardium. SPECT images demonstrate homogeneous tracer distribution throughout the  myocardium. IMPRESSION:  1. Normal myocardial perfusion imaging without significant evidence of  myocardial ischemia or infarction. 2.  Global left ventricular systolic function was normal with an EF of  77% without regional wall motion abnormalities. 3.  Significant electrocardiographic evidence of myocardial ischemia  during EKG monitoring without significant associated arrhythmias. The patient's Duke Treadmill score is 5, which correlates with a low  risk for significant coronary artery disease.     Overall, these results are most consistent with a low risk scan. Although the patient's results were not completely normal, unless  clinical suspicion for significant ongoing coronary artery ischemia is  high, I would not suggest pursuing additional testing by coronary  angiography.       Lurdes Mclean MD    D: 08/31/2022 11:30:29       T: 08/31/2022 11:32:40     LUDMILA/[email or web address]_[email or web address]  Job#: [id number]     Doc#: Unknown    CC:  NICOLA Quinn MD

## 2022-08-31 NOTE — TELEPHONE ENCOUNTER
----- Message from Magnolia Wright PA-C sent at 8/31/2022 12:33 PM EDT -----  Please let them know their stress test looked good, it was low risk. Will discuss at follow up. Thanks.

## 2022-09-19 ENCOUNTER — HOSPITAL ENCOUNTER (OUTPATIENT)
Age: 47
Discharge: HOME | End: 2022-09-19
Payer: COMMERCIAL

## 2022-09-19 DIAGNOSIS — I10 HYPERTENSION, UNSPECIFIED TYPE: ICD-10-CM

## 2022-09-19 DIAGNOSIS — E11.9 TYPE 2 DIABETES MELLITUS WITHOUT COMPLICATION, WITHOUT LONG-TERM CURRENT USE OF INSULIN (HCC): ICD-10-CM

## 2022-09-19 DIAGNOSIS — E78.5 HYPERLIPIDEMIA, UNSPECIFIED HYPERLIPIDEMIA TYPE: ICD-10-CM

## 2022-09-19 LAB
ABSOLUTE EOS #: 0.14 K/UL (ref 0–0.44)
ABSOLUTE IMMATURE GRANULOCYTE: <0.03 K/UL (ref 0–0.3)
ABSOLUTE LYMPH #: 2.43 K/UL (ref 1.1–3.7)
ABSOLUTE MONO #: 0.62 K/UL (ref 0.1–1.2)
ALT SERPL-CCNC: 82 U/L (ref 5–33)
ANION GAP SERPL CALCULATED.3IONS-SCNC: 12 MMOL/L (ref 9–17)
AST SERPL-CCNC: 77 U/L
BASOPHILS # BLD: 0 % (ref 0–2)
BASOPHILS ABSOLUTE: <0.03 K/UL (ref 0–0.2)
BUN BLDV-MCNC: 12 MG/DL (ref 6–20)
BUN/CREAT BLD: 21 (ref 9–20)
CALCIUM SERPL-MCNC: 10.1 MG/DL (ref 8.6–10.4)
CHLORIDE BLD-SCNC: 100 MMOL/L (ref 98–107)
CHOLESTEROL/HDL RATIO: 3.8
CHOLESTEROL: 203 MG/DL
CO2: 25 MMOL/L (ref 20–31)
CREAT SERPL-MCNC: 0.58 MG/DL (ref 0.5–0.9)
EOSINOPHILS RELATIVE PERCENT: 2 % (ref 1–4)
GFR AFRICAN AMERICAN: >60 ML/MIN
GFR NON-AFRICAN AMERICAN: >60 ML/MIN
GFR SERPL CREATININE-BSD FRML MDRD: ABNORMAL ML/MIN/{1.73_M2}
GFR SERPL CREATININE-BSD FRML MDRD: ABNORMAL ML/MIN/{1.73_M2}
GLUCOSE BLD-MCNC: 102 MG/DL (ref 70–99)
HCT VFR BLD CALC: 43.9 % (ref 36.3–47.1)
HDLC SERPL-MCNC: 54 MG/DL
HEMOGLOBIN: 15 G/DL (ref 11.9–15.1)
IMMATURE GRANULOCYTES: 0 %
LDL CHOLESTEROL: 96 MG/DL (ref 0–130)
LYMPHOCYTES # BLD: 35 % (ref 24–43)
MCH RBC QN AUTO: 33.2 PG (ref 25.2–33.5)
MCHC RBC AUTO-ENTMCNC: 34.2 G/DL (ref 28.4–34.8)
MCV RBC AUTO: 97.1 FL (ref 82.6–102.9)
MONOCYTES # BLD: 9 % (ref 3–12)
NRBC AUTOMATED: 0 PER 100 WBC
PDW BLD-RTO: 12.4 % (ref 11.8–14.4)
PLATELET # BLD: 200 K/UL (ref 138–453)
PMV BLD AUTO: 10.1 FL (ref 8.1–13.5)
POTASSIUM SERPL-SCNC: 4.3 MMOL/L (ref 3.7–5.3)
RBC # BLD: 4.52 M/UL (ref 3.95–5.11)
SEG NEUTROPHILS: 54 % (ref 36–65)
SEGMENTED NEUTROPHILS ABSOLUTE COUNT: 3.75 K/UL (ref 1.5–8.1)
SODIUM BLD-SCNC: 137 MMOL/L (ref 135–144)
TRIGL SERPL-MCNC: 267 MG/DL
WBC # BLD: 7 K/UL (ref 3.5–11.3)

## 2022-09-19 PROCEDURE — 80061 LIPID PANEL: CPT

## 2022-09-19 PROCEDURE — 36415 COLL VENOUS BLD VENIPUNCTURE: CPT

## 2022-09-19 PROCEDURE — 84460 ALANINE AMINO (ALT) (SGPT): CPT

## 2022-09-19 PROCEDURE — 85025 COMPLETE CBC W/AUTO DIFF WBC: CPT

## 2022-09-19 PROCEDURE — 84450 TRANSFERASE (AST) (SGOT): CPT

## 2022-09-19 PROCEDURE — 83036 HEMOGLOBIN GLYCOSYLATED A1C: CPT

## 2022-09-19 PROCEDURE — 80048 BASIC METABOLIC PNL TOTAL CA: CPT

## 2022-09-20 LAB
ESTIMATED AVERAGE GLUCOSE: 105 MG/DL
HBA1C MFR BLD: 5.3 % (ref 4–6)

## 2022-09-21 ENCOUNTER — OFFICE VISIT (OUTPATIENT)
Dept: CARDIOLOGY | Age: 47
End: 2022-09-21
Payer: COMMERCIAL

## 2022-09-21 VITALS
SYSTOLIC BLOOD PRESSURE: 112 MMHG | BODY MASS INDEX: 30.53 KG/M2 | OXYGEN SATURATION: 96 % | WEIGHT: 190 LBS | HEART RATE: 91 BPM | RESPIRATION RATE: 18 BRPM | DIASTOLIC BLOOD PRESSURE: 77 MMHG | HEIGHT: 66 IN

## 2022-09-21 DIAGNOSIS — I49.3 FREQUENT PVCS: ICD-10-CM

## 2022-09-21 DIAGNOSIS — I10 PRIMARY HYPERTENSION: ICD-10-CM

## 2022-09-21 DIAGNOSIS — R07.89 NON-CARDIAC CHEST PAIN: ICD-10-CM

## 2022-09-21 DIAGNOSIS — H43.393 VITREOUS FLOATERS OF BOTH EYES: ICD-10-CM

## 2022-09-21 DIAGNOSIS — Z86.16 HISTORY OF COVID-19: ICD-10-CM

## 2022-09-21 DIAGNOSIS — E11.9 DIABETES MELLITUS TYPE 2 WITHOUT RETINOPATHY (HCC): ICD-10-CM

## 2022-09-21 DIAGNOSIS — E78.2 MIXED HYPERLIPIDEMIA: ICD-10-CM

## 2022-09-21 DIAGNOSIS — Z82.49 FAMILY HISTORY OF HEART DISEASE: ICD-10-CM

## 2022-09-21 DIAGNOSIS — R00.2 PALPITATION: Primary | ICD-10-CM

## 2022-09-21 PROCEDURE — 3044F HG A1C LEVEL LT 7.0%: CPT | Performed by: FAMILY MEDICINE

## 2022-09-21 PROCEDURE — 99214 OFFICE O/P EST MOD 30 MIN: CPT | Performed by: FAMILY MEDICINE

## 2022-09-21 RX ORDER — METOPROLOL SUCCINATE 25 MG/1
25 TABLET, EXTENDED RELEASE ORAL DAILY
Qty: 90 TABLET | Refills: 3 | Status: CANCELLED | OUTPATIENT
Start: 2022-09-21

## 2022-09-21 RX ORDER — METOPROLOL SUCCINATE 25 MG/1
25 TABLET, EXTENDED RELEASE ORAL DAILY
Qty: 90 TABLET | Refills: 3 | Status: SHIPPED | OUTPATIENT
Start: 2022-09-21

## 2022-09-21 NOTE — PROGRESS NOTES
Isis Alicea am scribing for and in the presence of Cindy Garcia MD, MS, F.A.C.C..    Patient: Ryan Flores  : 1975  Date of Visit: 2022    REASON FOR VISIT / CONSULTATION: Follow-up (HX: HTN, palp, HLD, PVC, non cardiac CP. Pt is here for a 3 month follow up. Pt says she is feeling good. Palpitations daily, She denies CP, SOB, light headed/dizziness. )    History of Present Illness:        Dear Cher Montoya MD,    I had the pleasure of seeing Ryan Flores in my office today. Ms. Rogers Solares is a 52 y.o. female with a history of palpitations. Her father has history of stents in  in his late 42's and a mild heart attack when he was in his 63's requiring additional stent placement. Her mother and sister also has hyperlipemia and hypertension. Ms. Rogers Solares was diagnosed over 5 years with hypertension and hyperlipidemia. She is also prediabetic for about a year, she is now on metformin and trulicity. She had 3 children and had hypertension with her pregnancy. She did have some intolerance to some statins in the past. She has been an occasional social smoker on the weekends, never routine. EKG done 2022: Normal sinus rhythm    CAM monitor done on 2022:  1.  4 days and 22 hours recorded. 2.  Baseline rhythm is sinus with average heart rate of 87 bpm, ranging  between 58 and 141 bpm.  3.  Sinus tachycardia represented 12% of the study duration. 4.  Atrial tachycardia:  2 episodes. Longest/fastest, 3 beats at  average 112 bpm up to 126 bpm.  5.  PAC 0.02%. 6.  PVC 0.47%. 7.  Patient-activated events correlated with isolated PVCs. Echo done on 22: Global left ventricular systolic function appears preserved with an  estimated ejection fraction of 55%. The left ventricular cavity size is within normal limits and the left  ventricular wall thickness is within normal limits. No definite specific wall motion abnormalities were identified.   No significant valvular abnormalities. No clear evidence of diastolic dysfunction. Stress test done on 8/29/22: Normal myocardial perfusion imaging without significant evidence of myocardial ischemia or infarction. Global left ventricular systolic function was normal with an EF of 77% without regional wall motion abnormalities. Significant electrocardiographic evidence of myocardial ischemia during EKG monitoring without significant associated arrhythmias. The patient's Duke Treadmill score is 5, which correlates with a low risk for significant coronary artery disease. Overall, these results are most consistent with a low risk scan. Ms. Say Mc is here today for a 3 month follow up. She says she is doing good today. She denies any chest pains, pressure or tightness. She denies any shortness of breath or any light headed/dizziness. She does have palpitations daily that moderately bother her. She said she has has these palpitations since she had Covid in 2020. She says they are moderately bothersome to her and worse during times of anxiety and stress. She denies cough, fever or chills. Denies any blood in urine or stools. Ms. Say Mc denies any chest pain now, increased shortness of breath, abdominal pain, bleeding problems, problems with her medications or any other concerns at this time. Exercise Tolerance: Ms. Say Mc reports that she has a fair exercise tolerance. Her says that she could walk 1 mile without developing chest discomfort or significant shortness of breath.     PAST MEDICAL HISTORY:        Past Medical History:   Diagnosis Date    Allergic rhinitis 01/01/1995    Anxiety 6/22/2022    Asthma 01/01/2011    COVID-19 12/2020    Elevated liver enzymes 01/01/2011    Fatty liver disease, nonalcoholic 99/98/4935    Rufus's syndrome     Hyperlipidemia 01/01/2011    Hypertension 01/01/2011    h/o now controlled    Migraine     Obesity due to excess calories 11/17/2017    PIH (pregnancy induced hypertension) 01/01/2008 Pneumonia 01/01/2009    h/o    Urinary incontinence        CURRENT ALLERGIES: Latex REVIEW OF SYSTEMS: 14 systems were reviewed. Pertinent positives and negatives as above, all else negative. Past Surgical History:   Procedure Laterality Date    CYSTOURETHROSCOPY  5/2014    Dr. Winston Reed  5-13-14 [facility]    Hysteroscopy novasure ablation anterior repair and urethral sling, Dr. Kaela Zepeda History:  Social History     Tobacco Use    Smoking status: Former     Types: Cigarettes    Smokeless tobacco: Never    Tobacco comments:     was a social smoker previously   Vaping Use    Vaping Use: Never used   Substance Use Topics    Alcohol use:  Yes     Alcohol/week: 0.0 standard drinks     Comment: occ    Drug use: No        CURRENT MEDICATIONS:        Outpatient Medications Marked as Taking for the 9/21/22 encounter (Office Visit) with Barbara Welch MD   Medication Sig Dispense Refill    ezetimibe (ZETIA) 10 MG tablet TAKE 1 TABLET BY MOUTH ONE TIME A DAY 90 tablet 1    metFORMIN (GLUCOPHAGE-XR) 500 MG extended release tablet Take 1 tablet by mouth daily (with breakfast) 90 tablet 1    sertraline (ZOLOFT) 50 MG tablet Take 1 tablet by mouth daily 90 tablet 1    TRULICITY 2.93 TD/5.5TK SOPN INJECT THE CONTENTS OF ONE SYRINGE UNDER THE SKIN ONCE WEEKLY 2 mL 3    ASPIRIN LOW DOSE PO Take by mouth      albuterol sulfate HFA (PROVENTIL;VENTOLIN;PROAIR) 108 (90 Base) MCG/ACT inhaler Inhale 2 puffs into the lungs every 6 hours as needed      atorvastatin (LIPITOR) 10 MG tablet Take 1 tablet by mouth daily 90 tablet 3    famotidine (PEPCID) 20 MG tablet Take 20 mg by mouth daily      Lancets 28G MISC 1 Device by Does not apply route 2 times daily PRODIGY LANCETS 200 each 3    blood glucose monitor kit and supplies 1 kit by Other route 2 times daily Dispense prodigy meter per insurance coverage 1 kit 0    blood glucose monitor strips Test 2 times a day & as needed for symptoms of irregular blood glucose. Dispense sufficient amount for indicated testing frequency plus additional to accommodate PRN testing needs also, dispense prodigy test strips per insurance coverage 300 strip 3    Cholecalciferol (VITAMIN D) 50 MCG (2000 UT) CAPS capsule Take 4,000 Units by mouth daily 30 capsule 0    Multiple Vitamins-Minerals (EQ MULTIVITAMINS ADULT GUMMY PO) Take by mouth daily      loratadine (CLARITIN) 10 MG tablet Take 10 mg by mouth as needed       Spacer/Aero-Holding Chambers (OPTICHAMBER ROBBY) NATANAEL 1 Device by Does not apply route daily 1 Device 0    docusate sodium (COLACE) 100 MG capsule Take 1 capsule by mouth daily as needed for Constipation. 30 capsule 0       FAMILY HISTORY: family history includes Breast Cancer in her maternal grandmother; Cancer in her maternal grandmother and paternal grandmother; Diabetes in her mother; Heart Disease in her maternal grandfather, maternal grandmother, and paternal grandfather; Heart Disease (age of onset: 48) in her father; Heart Surgery in her maternal grandfather; High Blood Pressure in her father and sister; High Cholesterol in her father, mother, and sister; Hypertension in her father, mother, and sister. Physical Examination:      /77 (Site: Right Upper Arm, Position: Sitting, Cuff Size: Large Adult)   Pulse 91   Resp 18   Ht 5' 6\" (1.676 m)   Wt 190 lb (86.2 kg)   SpO2 96%   BMI 30.67 kg/m²  Body mass index is 30.67 kg/m². Constitutional: She is oriented to person. She appears well-developed and well-nourished. In no acute distress. HEENT: Normocephalic and atraumatic. No JVD present. Carotid bruit is not present. No mass and no thyromegaly present. No lymphadenopathy present. Cardiovascular: Normal rate, regular rhythm, normal heart sounds. Exam reveals no gallop and no friction rubs.  2/6 systolic murmur, 2nd intercostal space on the LEFT just lateral to the and instructed them to stop the medication of this occurs and call our office if this occurs. I sent this to her pharmacy and told her to pick this up if she thinks she needs this. Additional testing: Additional Testing List: None      Recurrent intermittent palpitations: Rate Control Symptomatic with moderate daily symptoms. CAM monitor, average heart rate 84 bpm. Symptoms associated with PVC's. Beta Blocker: START Metoprolol succinate (Toprol XL) 25 mg daily. I also discussed the potential side effects of this medication including lightheadedness and dizziness and instructed them to stop the medication of this occurs and call our office if this occurs. I sent this into her pharmacy and I told her to pick this up if she needs it. Calcium Channel Blocker: Not indicated at this time. Not indicated at this time. Additional Testing List: None    Hyperlipidemia: Mixed  Cholesterol Reduction Therapy: Continue ezetimide (Zetia) 10 mg daily.  on 1/31/2022. Cholesterol Reduction Therapy: Continue Atorvastatin (Lipitor) 10 mg daily. Vitreous floaters and visual disturbances:  Continue to follow up with Dr Jose Lewis as scheduled. Educated her to monitor blood pressure and call if it is elevated. Frequent PVC's as well as sinus tachycardia for 12% of test duration on recent Holter: On CAM monitor  Testing as above, palpitations are moderately bothersome will monitor for now. I did send in Toprol XL to her pharmacy if she would like to pick this up and start it as needed she can.      Family history of heart disease  Father stens placed in his 42's and 63's  The 10-year ASCVD risk score (Antoine Espino, et al., 2013) is: 1.5%    Values used to calculate the score:      Age: 52 years      Sex: Female      Is Non- : No      Diabetic: Yes      Tobacco smoker: No      Systolic Blood Pressure: 505 mmHg      Is BP treated: No      HDL Cholesterol: 54 mg/dL      Total Cholesterol: 203 mg/dL    Most likely non cardiac chest pain, possibly GERD:  Despite the patient's intermittent chest discomfort, I believe these symptoms are relatively atypical in nature and therefore likely noncardiac. Given their relatively normal stress test, I honestly do not think that further workup for a cardiac source of their symptoms is likely indicated now and would suggest that a workup for an alternative cause of their symptoms be considered if clinically indicated. Having said this, I did reiterate the reality that no test is 100% sensitive and therefore if symptoms persist, worsen and/or clinical suspicion for significant ischemic coronary artery disease remains high, further testing including the possibility of cardiac catheterization with coronary angiography may be appropriate to reconsider. Continue famotidine (Pepcid)    Diabetes:  Continue current treatment. History of COVID-19  Continue to monitor, she continues to have palpitations. In the meantime, I encouraged Ms. Melchor to continue to take her other medications. FOLLOW UP:   I told Ms. Melchor to call my office if she had any problems, but otherwise I asked her to Return if symptoms worsen or fail to improve. However, I would be happy to see her sooner should the need arise. Sincerely,  Cora Martinez MD, MS, F.A.C.C. [facility]    [facility], [address] [place] [address]  Phone: [phone], Fax: [phone]     I believe that the risk of significant morbidity and mortality related to the patient's current medical conditions are: Intermediate. Approximately 35 minutes were spent during prep work, discussion and exam of the patient, and follow up documentation and all of their questions were answered. The documentation recorded by the scribe, accurately and completely reflects the services I personally performed and the decisions made by me. Cora Martinez MD, MS, F.A.C.C.  September 21, 2022

## 2022-10-11 ENCOUNTER — OFFICE VISIT (OUTPATIENT)
Dept: OBGYN | Age: 47
End: 2022-10-11
Payer: COMMERCIAL

## 2022-10-11 ENCOUNTER — HOSPITAL ENCOUNTER (OUTPATIENT)
Age: 47
Setting detail: SPECIMEN
Discharge: HOME | End: 2022-10-11
Payer: COMMERCIAL

## 2022-10-11 VITALS
HEIGHT: 66 IN | DIASTOLIC BLOOD PRESSURE: 64 MMHG | WEIGHT: 194 LBS | BODY MASS INDEX: 31.18 KG/M2 | SYSTOLIC BLOOD PRESSURE: 118 MMHG

## 2022-10-11 DIAGNOSIS — Z01.419 WELL WOMAN EXAM WITH ROUTINE GYNECOLOGICAL EXAM: Primary | ICD-10-CM

## 2022-10-11 DIAGNOSIS — Z12.31 ENCOUNTER FOR SCREENING MAMMOGRAM FOR MALIGNANT NEOPLASM OF BREAST: ICD-10-CM

## 2022-10-11 DIAGNOSIS — N81.11 MIDLINE CYSTOCELE: ICD-10-CM

## 2022-10-11 DIAGNOSIS — Z01.419 WELL WOMAN EXAM WITH ROUTINE GYNECOLOGICAL EXAM: ICD-10-CM

## 2022-10-11 PROCEDURE — G0145 SCR C/V CYTO,THINLAYER,RESCR: HCPCS

## 2022-10-11 PROCEDURE — 99396 PREV VISIT EST AGE 40-64: CPT | Performed by: ADVANCED PRACTICE MIDWIFE

## 2022-10-11 NOTE — PROGRESS NOTES
YEARLY PHYSICAL    Date of service: 10/11/2022    Severiano Lynch  Is a 52 y.o.   female    PT's PCP is: Alyssa Childs MD     : 1975                                             Subjective:       Patient's last menstrual period was 2022 (approximate).      Are your menses regular: no    OB History    Para Term  AB Living   3 3 3         SAB IAB Ectopic Molar Multiple Live Births                    # Outcome Date GA Lbr Hudson/2nd Weight Sex Delivery Anes PTL Lv   3 Term  40w0d   M Vag-Spont      2 Term  40w0d   F Vag-Spont      1 Term 12 37w0d   F Vag-Spont           Social History     Tobacco Use   Smoking Status Former    Types: Cigarettes   Smokeless Tobacco Never   Tobacco Comments    was a social smoker previously        Social History     Substance and Sexual Activity   Alcohol Use Yes    Alcohol/week: 0.0 standard drinks    Comment: occ       Family History   Problem Relation Age of Onset    High Cholesterol Mother     Hypertension Mother     Diabetes Mother     Hypertension Father     Heart Disease Father 48    High Blood Pressure Father     High Cholesterol Father     High Cholesterol Sister     Hypertension Sister     High Blood Pressure Sister     Cancer Maternal Grandmother         colon    Heart Disease Maternal Grandmother     Breast Cancer Maternal Grandmother     Heart Surgery Maternal Grandfather     Heart Disease Maternal Grandfather     Cancer Paternal Grandmother     Heart Disease Paternal Grandfather        Any family history of breast or ovarian cancer: Yes-Mgma-Breast    Any family history of blood clots: No    Have you had a positive covid test: Yes    Have you had the covid immunization: Yes      Allergies: Latex      Current Outpatient Medications:     TRULICITY 8.69 SO/0.3OS SOPN, INJECT THE CONTENTS OF ONE SYRINGE UNDER THE SKIN ONCE WEEKLY, Disp: 2 mL, Rfl: 3    ezetimibe (Kareem Butler) 10 MG tablet, TAKE 1 TABLET BY MOUTH ONE TIME A DAY, Disp: 90 tablet, Rfl: 1    metoprolol succinate (TOPROL XL) 25 MG extended release tablet, Take 1 tablet by mouth daily (Patient taking differently: Take 25 mg by mouth daily Pt states she takes as needed.), Disp: 90 tablet, Rfl: 3    metFORMIN (GLUCOPHAGE-XR) 500 MG extended release tablet, Take 1 tablet by mouth daily (with breakfast), Disp: 90 tablet, Rfl: 1    sertraline (ZOLOFT) 50 MG tablet, Take 1 tablet by mouth daily, Disp: 90 tablet, Rfl: 1    ASPIRIN LOW DOSE PO, Take by mouth, Disp: , Rfl:     albuterol sulfate HFA (PROVENTIL;VENTOLIN;PROAIR) 108 (90 Base) MCG/ACT inhaler, Inhale 2 puffs into the lungs every 6 hours as needed, Disp: , Rfl:     atorvastatin (LIPITOR) 10 MG tablet, Take 1 tablet by mouth daily, Disp: 90 tablet, Rfl: 3    famotidine (PEPCID) 20 MG tablet, Take 20 mg by mouth daily, Disp: , Rfl:     Cholecalciferol (VITAMIN D) 50 MCG (2000 UT) CAPS capsule, Take 4,000 Units by mouth daily, Disp: 30 capsule, Rfl: 0    Multiple Vitamins-Minerals (EQ MULTIVITAMINS ADULT GUMMY PO), Take by mouth daily, Disp: , Rfl:     loratadine (CLARITIN) 10 MG tablet, Take 10 mg by mouth as needed , Disp: , Rfl:     docusate sodium (COLACE) 100 MG capsule, Take 1 capsule by mouth daily as needed for Constipation. , Disp: 30 capsule, Rfl: 0    Lancets 28G MISC, 1 Device by Does not apply route 2 times daily PRODIGY LANCETS, Disp: 200 each, Rfl: 3    blood glucose monitor kit and supplies, 1 kit by Other route 2 times daily Dispense prodigy meter per insurance coverage, Disp: 1 kit, Rfl: 0    blood glucose monitor strips, Test 2 times a day & as needed for symptoms of irregular blood glucose.  Dispense sufficient amount for indicated testing frequency plus additional to accommodate PRN testing needs also, dispense prodigy test strips per insurance coverage, Disp: 300 strip, Rfl: 3    Spacer/Aero-Holding Chambers (OPT[facility] Center[facility]ENIC BUSTAMANTE) NATANAEL, 1 Device by Does not apply route daily (Patient not taking: Reported on 10/11/2022), Disp: 1 Device, Rfl: 0    Social History     Substance and Sexual Activity   Sexual Activity Yes    Partners: Male    Comment: ablation        Any bleeding or pain with intercourse: No    Last Yearly:  10/31/19    Last pap: 10/31/19    Last HPV: 10/31/19    Last Mammogram: 8/3/21    Last Dexascan never    Last colorectal screen- type:never    Do you do self breast exams: Yes    Past Medical History:   Diagnosis Date    Allergic rhinitis 01/01/1995    Anxiety 6/22/2022    Asthma 01/01/2011    COVID-19 12/2020    Elevated liver enzymes 01/01/2011    Fatty liver disease, nonalcoholic 06/56/4432    Rufus's syndrome     Hyperlipidemia 01/01/2011    Hypertension 01/01/2011    h/o now controlled    Migraine     Obesity due to excess calories 11/17/2017    PIH (pregnancy induced hypertension) 01/01/2008    Pneumonia 01/01/2009    h/o    Urinary incontinence        Past Surgical History:   Procedure Laterality Date    CYSTOURETHROSCOPY  5/2014    Dr. Bella Russo  5-13-14 [facility]    Hysteroscopy novasure ablation anterior repair and urethral sling, Dr. Deleon Comp History   Problem Relation Age of Onset    High Cholesterol Mother     Hypertension Mother     Diabetes Mother     Hypertension Father     Heart Disease Father 48    High Blood Pressure Father     High Cholesterol Father     High Cholesterol Sister     Hypertension Sister     High Blood Pressure Sister     Cancer Maternal Grandmother         colon    Heart Disease Maternal Grandmother     Breast Cancer Maternal Grandmother     Heart Surgery Maternal Grandfather     Heart Disease Maternal Grandfather     Cancer Paternal Grandmother     Heart Disease Paternal Grandfather        Chief Complaint   Patient presents with    Gynecologic Exam     Pt is here today for routine yearly pelvic and breast exam. Pt last pap was 10/31/19 (-), hpv (-). In the month of September pt states she had two cycles. PE:  Vital Signs  Blood pressure 118/64, height 5' 6\" (1.676 m), weight 194 lb (88 kg), last menstrual period 09/26/2022, not currently breastfeeding. Estimated body mass index is 31.31 kg/m² as calculated from the following:    Height as of this encounter: 5' 6\" (1.676 m). Weight as of this encounter: 194 lb (88 kg). Labs:    No results found for this visit on 10/11/22. No data recorded    NURSE: LMD    HPI: For routine well woman exam.  Patient states she does have an irregular heart rate when she is stressed and does have medication for this. Patient states she has had feeling some pelvic pressure but no urinary leaking    Review of Systems   All other systems reviewed and are negative. Objective  Lymphatic:   no lymphadenopathy  Heent:   negative   Cor: regular rate and rhythm, no murmurs              Pul:clear to auscultation bilaterally- no wheezes, rales or rhonchi, normal air movement, no respiratory distress      GI: Abdomen soft, non-tender. BS normal. No masses,  No organomegaly           Extremities: normal strength, tone, and muscle mass   Breasts: Breast:normal appearance, no masses or tenderness   Pelvic Exam: GENITAL/URINARY:  External Genitalia:  General appearance; normal, Hair distribution; normal, Lesions absent  Urethra: Fullness absent, Masses absent  Bladder:  Fullness absent, Masses absent, Tenderness absent, Cystocele noted  Vagina:  General appearance normal, Estrogen effect normal, Discharge absent, Lesions absent, Pelvic support normal  Cervix:  General appearance normal, Lesions absent, Discharge absent, Tenderness absent, Enlargement absent, Nodularity absent  Uterus:  Size normal, Contour normal, Position normal  Adenexa:   Masses absent                                    Vaginal discharge: no vaginal discharge                               Assessment and Plan          Diagnosis Orders   1. Well woman exam with routine gynecological exam  PAP SMEAR      2. Encounter for screening mammogram for malignant neoplasm of breast  SHARIFA YVES DIGITAL SCREEN BILATERAL      3. Midline cystocele                  I am having Pilar Eaton maintain her docusate sodium, OptiChamber Sangita, loratadine, Multiple Vitamins-Minerals (EQ MULTIVITAMINS ADULT GUMMY PO), vitamin D, blood glucose monitor kit and supplies, blood glucose test strips, Lancets 28G, famotidine, atorvastatin, ASPIRIN LOW DOSE PO, albuterol sulfate HFA, metFORMIN, sertraline, metoprolol succinate, Trulicity, and ezetimibe. Return in about 1 year (around 10/11/2023) for yearly, set up routine mammo. She was also counseled on her preventative health maintenance recommendations and follow-up. There are no Patient Instructions on file for this visit.     DYAN García CNM,10/11/2022 12:46 PM

## 2022-10-18 LAB — CYTOLOGY REPORT: NORMAL

## 2022-11-09 ENCOUNTER — HOSPITAL ENCOUNTER (OUTPATIENT)
Dept: WOMENS IMAGING | Age: 47
Discharge: HOME | End: 2022-11-11
Payer: COMMERCIAL

## 2022-11-09 DIAGNOSIS — Z12.31 ENCOUNTER FOR SCREENING MAMMOGRAM FOR MALIGNANT NEOPLASM OF BREAST: ICD-10-CM

## 2022-11-09 PROCEDURE — 77067 SCR MAMMO BI INCL CAD: CPT

## 2023-01-06 ENCOUNTER — OFFICE VISIT (OUTPATIENT)
Dept: PRIMARY CARE CLINIC | Age: 48
End: 2023-01-06
Payer: COMMERCIAL

## 2023-01-06 VITALS
HEART RATE: 98 BPM | SYSTOLIC BLOOD PRESSURE: 126 MMHG | DIASTOLIC BLOOD PRESSURE: 82 MMHG | HEIGHT: 66 IN | OXYGEN SATURATION: 98 % | WEIGHT: 195 LBS | BODY MASS INDEX: 31.34 KG/M2

## 2023-01-06 DIAGNOSIS — E78.5 HYPERLIPIDEMIA, UNSPECIFIED HYPERLIPIDEMIA TYPE: ICD-10-CM

## 2023-01-06 DIAGNOSIS — E11.9 TYPE 2 DIABETES MELLITUS WITHOUT COMPLICATION, WITHOUT LONG-TERM CURRENT USE OF INSULIN (HCC): ICD-10-CM

## 2023-01-06 DIAGNOSIS — I10 PRIMARY HYPERTENSION: Primary | ICD-10-CM

## 2023-01-06 DIAGNOSIS — F41.9 ANXIETY: ICD-10-CM

## 2023-01-06 PROCEDURE — 3079F DIAST BP 80-89 MM HG: CPT | Performed by: STUDENT IN AN ORGANIZED HEALTH CARE EDUCATION/TRAINING PROGRAM

## 2023-01-06 PROCEDURE — 99214 OFFICE O/P EST MOD 30 MIN: CPT | Performed by: STUDENT IN AN ORGANIZED HEALTH CARE EDUCATION/TRAINING PROGRAM

## 2023-01-06 PROCEDURE — 3074F SYST BP LT 130 MM HG: CPT | Performed by: STUDENT IN AN ORGANIZED HEALTH CARE EDUCATION/TRAINING PROGRAM

## 2023-01-06 ASSESSMENT — PATIENT HEALTH QUESTIONNAIRE - PHQ9
DEPRESSION UNABLE TO ASSESS: PT REFUSES
SUM OF ALL RESPONSES TO PHQ QUESTIONS 1-9: 0
SUM OF ALL RESPONSES TO PHQ9 QUESTIONS 1 & 2: 0
SUM OF ALL RESPONSES TO PHQ QUESTIONS 1-9: 0
2. FEELING DOWN, DEPRESSED OR HOPELESS: 0
1. LITTLE INTEREST OR PLEASURE IN DOING THINGS: 0

## 2023-01-06 NOTE — PROGRESS NOTES
Owen [address], [address] , [facility], [address] [place] [address] Drive is a 52 y.o. female with  has a past medical history of Allergic rhinitis, Anxiety, Asthma, COVID-19, Elevated liver enzymes, Fatty liver disease, nonalcoholic, Rufus's syndrome, Hyperlipidemia, Hypertension, Migraine, Obesity due to excess calories, PIH (pregnancy induced hypertension), Pneumonia, and Urinary incontinence. Presented to the office today for:  Chief Complaint   Patient presents with    6 Month Follow-Up    Hypertension    Anxiety    Hyperlipidemia    Diabetes       Assessment/Plan   1. Primary hypertension  -     CBC with Auto Differential; Future  -     Comprehensive Metabolic Panel; Future  2. Type 2 diabetes mellitus without complication, without long-term current use of insulin (HCC)  -     CBC with Auto Differential; Future  -     Comprehensive Metabolic Panel; Future  -     Hemoglobin A1C; Future  3. Hyperlipidemia, unspecified hyperlipidemia type  -     Lipid Panel; Future  4. Anxiety    Return in about 6 months (around 7/6/2023) for F/U Meds/HTN. HTN - stable at this time, at goal, continue w/ Toprol XL 25mg PO daily  T2DM -at goal and stable, continue w/ metformin 500mg   Hyperlipidemia - continue w/ statin and Zetia  Medications: Zoloft, 50mg and consider to adjust this if needed; and/or switch to an alternative such as Wellbutrin/Cymbalta. Reviewed concept of anxiety as biochemical imbalance of neurotransmitters and rationale for treatment. Instructed patient to contact office or on-call physician promptly should condition worsen or any new symptoms appear and provided on-call telephone numbers. IF THE PATIENT HAS ANY SUICIDAL OR HOMICIDAL IDEATIONS, CALL THE OFFICE, DISCUSS WITH A SUPPORT MEMBER, OR GO TO THE ER IMMEDIATELY. Patient was agreeable with this plan. All patient questions answered. Pt voiced understanding.    There are no discontinued medications. Patient received counseling on the following healthy behaviors: nutrition, exercise and medication adherence. I encouraged and discussed lifestyle modifications including diet and exercise and the patient was agreeable to making positive/beneficial changes to both to help improve their overall health. Discussed use, benefit, and side effects of prescribed medications. Barriers to medication compliance addressed. Patient given educational materials: see patient instructions. HM - HM items completed today as per orders. Outstanding HM items though not limited to immunizations were discussed with the patient today, including risks, benefits and alternatives. The patient will discuss these during the next appointment per their preference. If there are any worsening or concerning signs or symptoms, patient will report to the ED and/or contact [id number] for immediate evaluation. Teach back method was used. Subjective:    HPI  Chief Complaint   Patient presents with    6 Month Follow-Up    Hypertension    Anxiety    Hyperlipidemia    Diabetes     Anxiety: The patient presents for follow up of anxiety disorder. She has the following anxiety symptoms: palpitations at this time. Current treatment includes Zoloft 50 mg po qd and tolerating it well. Hypertension: Patient here for follow-up of elevated blood pressure. She is not exercising and is adherent to low salt diet. Blood pressure is well controlled at home. Cardiac symptoms palpitations. Patient denies chest pain, chest pressure/discomfort, claudication, dyspnea, exertional chest pressure/discomfort, fatigue, irregular heart beat, lower extremity edema, near-syncope, orthopnea, paroxysmal nocturnal dyspnea, syncope, and tachypnea. Cardiovascular risk factors: diabetes mellitus. Use of agents associated with hypertension: none. History of target organ damage: none. The patient has been taking Toprol XL 25mg PO daily.     DIABETES MELLITUS:  Medication compliance:  compliant all of the time  Diabetic diet compliance:  compliant most of the time  Current exercise: no regular exercise  How long are you exercising during the week? Work keeps her busy   Frequency of testing: none  Home blood sugar records: n/a  Any episodes of hypoglycemia? no  Eye exam current (within one year): unknown  Diabetic foot check in the past year No:   Meds - metformin and trulicity  Home blood sugar records: patient does not test at home  Any episodes of hypoglycemia? no  Fluctuating blood sugars?no  Weight trend: stable  Current diet: in general, a \"healthy\" diet  , well balanced  Current exercise: none/walking-while at work mostly  Hgb A1c - 5.3 (9/19/22)    Hyperlipidemia:  No new myalgias or GI upset on atorvastatin (Lipitor) (10mg) and eztemibe (Zetia) (about 5 years). Medication compliance: compliant all of the time. Patient is following a low fat, low cholesterol diet.    Lab Results   Component Value Date    CHOL 203 (H) 09/19/2022    TRIG 267 (H) 09/19/2022    HDL 54 09/19/2022    LDLDIRECT 200 (H) 09/28/2015     Lab Results   Component Value Date    ALT 82 (H) 09/19/2022    AST 77 (H) 09/19/2022          Health Maintenance -   Alcohol/Substance use History - None    Tobacco Use      Smoking status: Former        Types: Cigarettes      Smokeless tobacco: Never      Tobacco comments: was a social smoker previously    Family History   Problem Relation Age of Onset    High Cholesterol Mother     Hypertension Mother     Diabetes Mother     Hypertension Father     Heart Disease Father 48    High Blood Pressure Father     High Cholesterol Father     High Cholesterol Sister     Hypertension Sister     High Blood Pressure Sister     Cancer Maternal Grandmother         colon    Heart Disease Maternal Grandmother     Breast Cancer Maternal Grandmother     Heart Surgery Maternal Grandfather     Heart Disease Maternal Grandfather     Cancer Paternal Grandmother     Heart Disease Paternal Grandfather        [facility] Scores 1/6/2023 6/22/2022 2/1/2022 2/3/2021 3/9/2020 7/19/2019 5/18/2018   PHQ2 Score 0 0 0 0 0 0 0   PHQ9 Score 0 0 0 0 0 0 0     Interpretation of Total Score Depression Severity: 1-4 = Minimal depression, 5-9 = Mild depression, 10-14 = Moderate depression, 15-19 = Moderately severe depression, 20-27 = Severe depression    Review of Systems  Constitutional: Negative for activity change, appetite change, chills, diaphoresis, fatigue, fever and unexpected weight change. HENT: Negative for sinus pressure, sinus pain, sore throat and trouble swallowing. Respiratory: Negative for cough, shortness of breath and wheezing. Cardiovascular: Negative for chest pain, palpitations and leg swelling. Gastrointestinal: Negative for abdominal pain, diarrhea, nausea and vomiting. Endocrine: Negative for cold intolerance, polydipsia, polyphagia and polyuria. Genitourinary: Negative for difficulty urinating, flank pain and frequency. Musculoskeletal: Negative for gait problem and joint swelling. Negative for back pain, neck pain and neck stiffness. Skin: Negative for color change and wound. Negative for pallor and rash. Allergic/Immunologic: Negative for environmental allergies and food allergies. Neurological: Negative for light-headedness, numbness and headaches. Psychiatric/Behavioral: Negative for sleep disturbance. Negative for confusion and suicidal ideas. Objective:    /82   Pulse 98   Ht 5' 6\" (1.676 m)   Wt 195 lb (88.5 kg)   SpO2 98%   BMI 31.47 kg/m²    BP Readings from Last 3 Encounters:   01/06/23 126/82   10/11/22 118/64   09/21/22 112/77     Physical Exam  Constitutional: Patient is oriented to person, place, and time. Patient appears well-developed and well-nourished. No distress. HENT: Head: Normocephalic and atraumatic. Eyes: Pupils are equal, round, and reactive to light. Conjunctivae are normal. Right eye exhibits no discharge.  Left eye exhibits no discharge. Cardiovascular: Normal rate, regular rhythm and normal heart sounds. Pulmonary/Chest: Effort normal and breath sounds normal. No respiratory distress. Patient has no wheezes. Abdominal: Soft. Bowel sounds are normal. Patient exhibits no distension. There is no tenderness. Musculoskeletal:  Patient exhibits no edema and tenderness. Patient exhibits no deformity. Neurological: Patient is alert and oriented to person, place, and time. Skin: Skin is warm and dry. Patient is not diaphoretic. Psychiatric: Patient's speech is normal and behavior is normal. Thought content normal.   Mental Status: appearance:  appropriately dressed and healthy looking, behavior:  normal, attitude:  cooperative, speech:  appropriate, mood:  euthymic, affect:  congruent with mood, thought content:  no evidence of psychosis, thought process:  logical and coherent, orientation:  oriented in all spheres, memory:  recent:  good and remote:  good, insight:  good , judgment:  good , and cognitive:  intact  Vitals reviewed. Lab Results   Component Value Date    WBC 7.0 09/19/2022    HGB 15.0 09/19/2022    HCT 43.9 09/19/2022     09/19/2022    CHOL 203 (H) 09/19/2022    TRIG 267 (H) 09/19/2022    HDL 54 09/19/2022    LDLDIRECT 200 (H) 09/28/2015    ALT 82 (H) 09/19/2022    AST 77 (H) 09/19/2022     09/19/2022    K 4.3 09/19/2022     09/19/2022    CREATININE 0.58 09/19/2022    BUN 12 09/19/2022    CO2 25 09/19/2022    TSH 2.00 05/28/2022    LABA1C 5.3 09/19/2022    LABMICR CANNOT BE CALCULATED 09/27/2021     Lab Results   Component Value Date    CALCIUM 10.1 09/19/2022     Lab Results   Component Value Date    LDLCHOLESTEROL 96 09/19/2022    LDLDIRECT 200 (H) 09/28/2015       Please note that this chart was generated using voice recognition Dragon dictation software.  Although every effort was made to ensure the accuracy of this automated transcription, some errors in transcription may have occurred.     Electronically signed by Dr. Ivet Monte MD on 1/6/2023 at 8:30 AM

## 2023-01-26 ENCOUNTER — OFFICE VISIT (OUTPATIENT)
Dept: PRIMARY CARE CLINIC | Age: 48
End: 2023-01-26
Payer: COMMERCIAL

## 2023-01-26 VITALS
DIASTOLIC BLOOD PRESSURE: 82 MMHG | BODY MASS INDEX: 31.18 KG/M2 | RESPIRATION RATE: 18 BRPM | HEIGHT: 66 IN | SYSTOLIC BLOOD PRESSURE: 128 MMHG | WEIGHT: 194 LBS

## 2023-01-26 DIAGNOSIS — R10.31 RLQ ABDOMINAL PAIN: Primary | ICD-10-CM

## 2023-01-26 DIAGNOSIS — R11.0 NAUSEA: ICD-10-CM

## 2023-01-26 PROCEDURE — 99214 OFFICE O/P EST MOD 30 MIN: CPT | Performed by: STUDENT IN AN ORGANIZED HEALTH CARE EDUCATION/TRAINING PROGRAM

## 2023-01-26 PROCEDURE — 3079F DIAST BP 80-89 MM HG: CPT | Performed by: STUDENT IN AN ORGANIZED HEALTH CARE EDUCATION/TRAINING PROGRAM

## 2023-01-26 PROCEDURE — 3074F SYST BP LT 130 MM HG: CPT | Performed by: STUDENT IN AN ORGANIZED HEALTH CARE EDUCATION/TRAINING PROGRAM

## 2023-01-26 RX ORDER — ONDANSETRON 4 MG/1
4 TABLET, ORALLY DISINTEGRATING ORAL 3 TIMES DAILY PRN
Qty: 21 TABLET | Refills: 1 | Status: SHIPPED | OUTPATIENT
Start: 2023-01-26

## 2023-01-26 NOTE — PROGRESS NOTES
Emilee Marcus Dr, [address] , [facility], [address] [place] [address] Drive is a 52 y.o. female with  has a past medical history of Allergic rhinitis, Anxiety, Asthma, COVID-19, Elevated liver enzymes, Fatty liver disease, nonalcoholic, Rufus's syndrome, Hyperlipidemia, Hypertension, Migraine, Obesity due to excess calories, PIH (pregnancy induced hypertension), Pneumonia, and Urinary incontinence. Presented to the office today for:  Chief Complaint   Patient presents with    Abdominal Pain       Assessment/Plan   1. RLQ abdominal pain  -     CBC with Auto Differential; Future  -     Comprehensive Metabolic Panel; Future  -     C-Reactive Protein; Future  -     CT ABDOMEN PELVIS W IV CONTRAST Additional Contrast? None; Future  -     Urinalysis with Reflex to Culture; Future  2. Nausea  -     ondansetron (ZOFRAN-ODT) 4 MG disintegrating tablet; Take 1 tablet by mouth 3 times daily as needed for Nausea or Vomiting, Disp-21 tablet, R-1Normal    Return if symptoms worsen or fail to improve. Given the acuity of the pain, plan for imaging and labs STAT to be completed today. Discussed OP vs Emergent ED follow-up today with the patient. Risks/benefits/alternatives were discussed. All patient questions answered. Pt voiced understanding. There are no discontinued medications. Patient received counseling on the following healthy behaviors: nutrition, exercise and medication adherence. I encouraged and discussed lifestyle modifications including diet and exercise and the patient was agreeable to making positive/beneficial changes to both to help improve their overall health. Discussed use, benefit, and side effects of prescribed medications. Barriers to medication compliance addressed. Patient given educational materials: see patient instructions. HM - HM items completed today as per orders.  Outstanding HM items though not limited to immunizations were discussed with the patient today, including risks, benefits and alternatives. The patient will discuss these during the next appointment per their preference. If there are any worsening or concerning signs or symptoms, patient will report to the ED and/or contact EMS-[id number] for immediate evaluation. Teach back method was used. Subjective:    HPI  Chief Complaint   Patient presents with    Abdominal Pain     Abdominal Pain for 2 days  Nature of Onset and Mechanism -  gradual, worsening  Location - All over abdomen and worse in the right lower quadrant. Characteristics/Radiation/Quality - Dull ache that is constant. Patient reports having normal BM. She started menses 2 days ago. Patient states she has had increased gas and belching. No fever/chills. The patient's son has a sinus infection. No prior abdominal surgeries. No changes to her diet recently. No changes to medication. Hx of ovarian cysts in the past. No known kidney stones. Severity (0-10) - 7/10 (day 1) and currently a 3-4/10  Aggravating Factors - nothing in particular  Relieving Factors/Treatment tried - Tylenol, NSAID's with minimal relief.     Health Maintenance -   Alcohol/Substance use History - None    Tobacco Use      Smoking status: Former        Types: Cigarettes      Smokeless tobacco: Never      Tobacco comments: was a social smoker previously    Family History   Problem Relation Age of Onset    High Cholesterol Mother     Hypertension Mother     Diabetes Mother     Hypertension Father     Heart Disease Father 48    High Blood Pressure Father     High Cholesterol Father     High Cholesterol Sister     Hypertension Sister     High Blood Pressure Sister     Cancer Maternal Grandmother         colon    Heart Disease Maternal Grandmother     Breast Cancer Maternal Grandmother     Heart Surgery Maternal Grandfather     Heart Disease Maternal Grandfather     Cancer Paternal Grandmother     Heart Disease Paternal Grandfather        [facility] Scores 1/6/2023 6/22/2022 2/1/2022 2/3/2021 3/9/2020 7/19/2019 5/18/2018   PHQ2 Score 0 0 0 0 0 0 0   PHQ9 Score 0 0 0 0 0 0 0     Interpretation of Total Score Depression Severity: 1-4 = Minimal depression, 5-9 = Mild depression, 10-14 = Moderate depression, 15-19 = Moderately severe depression, 20-27 = Severe depression    Review of Systems  Constitutional: Negative for activity change, appetite change, chills, diaphoresis, fatigue, fever and unexpected weight change. HENT: Negative for sinus pressure, sinus pain, sore throat and trouble swallowing. Respiratory: Negative for cough, shortness of breath and wheezing. Cardiovascular: Negative for chest pain, palpitations and leg swelling. Gastrointestinal: Positive for abdominal pain, no diarrhea, nausea and vomiting. Endocrine: Negative for cold intolerance, polydipsia, polyphagia and polyuria. Genitourinary: Negative for difficulty urinating, flank pain and frequency. Musculoskeletal: Negative for gait problem and joint swelling. Negative for back pain, neck pain and neck stiffness. Skin: Negative for color change and wound. Negative for pallor and rash. Allergic/Immunologic: Negative for environmental allergies and food allergies. Neurological: Negative for light-headedness, numbness and headaches. Psychiatric/Behavioral: Negative for sleep disturbance. Negative for confusion and suicidal ideas. Objective:    /82   Resp 18   Ht 5' 6\" (1.676 m)   Wt 194 lb (88 kg)   BMI 31.31 kg/m²    BP Readings from Last 3 Encounters:   01/26/23 128/82   01/06/23 126/82   10/11/22 118/64     Physical Exam  Constitutional: Patient is oriented to person, place, and time. Patient appears well-developed and well-nourished. No distress. HENT: Head: Normocephalic and atraumatic. Eyes: Pupils are equal, round, and reactive to light. Conjunctivae are normal. Right eye exhibits no discharge. Left eye exhibits no discharge.    Cardiovascular: Normal rate, regular rhythm and normal heart sounds. Pulmonary/Chest: Effort normal and breath sounds normal. No respiratory distress. Patient has no wheezes. Abdominal: Soft. Bowel sounds are normal. Patient exhibits no distension. There is some tenderness in the RLQ. Musculoskeletal:  Patient exhibits no edema and tenderness. Patient exhibits no deformity. Neurological: Patient is alert and oriented to person, place, and time. Skin: Skin is warm and dry. Patient is not diaphoretic. Psychiatric: Patient's speech is normal and behavior is normal. Thought content normal.   Vitals reviewed. Lab Results   Component Value Date    WBC 7.0 09/19/2022    HGB 15.0 09/19/2022    HCT 43.9 09/19/2022     09/19/2022    CHOL 203 (H) 09/19/2022    TRIG 267 (H) 09/19/2022    HDL 54 09/19/2022    LDLDIRECT 200 (H) 09/28/2015    ALT 82 (H) 09/19/2022    AST 77 (H) 09/19/2022     09/19/2022    K 4.3 09/19/2022     09/19/2022    CREATININE 0.58 09/19/2022    BUN 12 09/19/2022    CO2 25 09/19/2022    TSH 2.00 05/28/2022    LABA1C 5.3 09/19/2022    LABMICR CANNOT BE CALCULATED 09/27/2021     Lab Results   Component Value Date    CALCIUM 10.1 09/19/2022     Lab Results   Component Value Date    LDLCHOLESTEROL 96 09/19/2022    LDLDIRECT 200 (H) 09/28/2015       Please note that this chart was generated using voice recognition Dragon dictation software. Although every effort was made to ensure the accuracy of this automated transcription, some errors in transcription may have occurred.     Electronically signed by Dr. Nithya Spangler MD on 1/26/2023 at 4:08 PM

## 2023-01-27 ENCOUNTER — HOSPITAL ENCOUNTER (OUTPATIENT)
Dept: CT IMAGING | Age: 48
Discharge: HOME | End: 2023-01-27
Payer: COMMERCIAL

## 2023-01-27 ENCOUNTER — HOSPITAL ENCOUNTER (OUTPATIENT)
Age: 48
End: 2023-01-27
Payer: COMMERCIAL

## 2023-01-27 ENCOUNTER — HOSPITAL ENCOUNTER (OUTPATIENT)
Age: 48
Discharge: HOME | End: 2023-01-27
Payer: COMMERCIAL

## 2023-01-27 DIAGNOSIS — R10.31 RLQ ABDOMINAL PAIN: ICD-10-CM

## 2023-01-27 LAB
ABSOLUTE EOS #: 0.12 K/UL (ref 0–0.44)
ABSOLUTE IMMATURE GRANULOCYTE: <0.03 K/UL (ref 0–0.3)
ABSOLUTE LYMPH #: 2.4 K/UL (ref 1.1–3.7)
ABSOLUTE MONO #: 0.54 K/UL (ref 0.1–1.2)
ALBUMIN SERPL-MCNC: 4.5 G/DL (ref 3.5–5.2)
ALBUMIN/GLOBULIN RATIO: 1.5 (ref 1–2.5)
ALP BLD-CCNC: 72 U/L (ref 35–104)
ALT SERPL-CCNC: 79 U/L (ref 5–33)
ANION GAP SERPL CALCULATED.3IONS-SCNC: 13 MMOL/L (ref 9–17)
AST SERPL-CCNC: 73 U/L
BACTERIA: ABNORMAL
BASOPHILS # BLD: 0 % (ref 0–2)
BASOPHILS ABSOLUTE: <0.03 K/UL (ref 0–0.2)
BILIRUB SERPL-MCNC: 0.4 MG/DL (ref 0.3–1.2)
BILIRUBIN URINE: NEGATIVE
BUN BLDV-MCNC: 11 MG/DL (ref 6–20)
BUN/CREAT BLD: 18 (ref 9–20)
C-REACTIVE PROTEIN: 15 MG/L (ref 0–5)
CALCIUM SERPL-MCNC: 10.3 MG/DL (ref 8.6–10.4)
CHLORIDE BLD-SCNC: 100 MMOL/L (ref 98–107)
CO2: 24 MMOL/L (ref 20–31)
COLOR: YELLOW
CREAT SERPL-MCNC: 0.6 MG/DL (ref 0.5–0.9)
EOSINOPHILS RELATIVE PERCENT: 2 % (ref 1–4)
EPITHELIAL CELLS UA: ABNORMAL /HPF (ref 0–25)
GFR SERPL CREATININE-BSD FRML MDRD: >60 ML/MIN/1.73M2
GLUCOSE BLD-MCNC: 92 MG/DL (ref 70–99)
GLUCOSE URINE: NEGATIVE
HCT VFR BLD CALC: 44.3 % (ref 36.3–47.1)
HEMOGLOBIN: 15.2 G/DL (ref 11.9–15.1)
IMMATURE GRANULOCYTES: 0 %
KETONES, URINE: NEGATIVE
LEUKOCYTE ESTERASE, URINE: NEGATIVE
LYMPHOCYTES # BLD: 40 % (ref 24–43)
MCH RBC QN AUTO: 33.6 PG (ref 25.2–33.5)
MCHC RBC AUTO-ENTMCNC: 34.3 G/DL (ref 28.4–34.8)
MCV RBC AUTO: 97.8 FL (ref 82.6–102.9)
MONOCYTES # BLD: 9 % (ref 3–12)
NITRITE, URINE: NEGATIVE
NRBC AUTOMATED: 0 PER 100 WBC
PDW BLD-RTO: 12.1 % (ref 11.8–14.4)
PH UA: 6 (ref 5–9)
PLATELET # BLD: 211 K/UL (ref 138–453)
PMV BLD AUTO: 9.5 FL (ref 8.1–13.5)
POTASSIUM SERPL-SCNC: 4.4 MMOL/L (ref 3.7–5.3)
PROTEIN UA: NEGATIVE
RBC # BLD: 4.53 M/UL (ref 3.95–5.11)
RBC UA: ABNORMAL /HPF (ref 0–2)
SEG NEUTROPHILS: 49 % (ref 36–65)
SEGMENTED NEUTROPHILS ABSOLUTE COUNT: 2.9 K/UL (ref 1.5–8.1)
SODIUM BLD-SCNC: 137 MMOL/L (ref 135–144)
SPECIFIC GRAVITY UA: 1.02 (ref 1.01–1.02)
TOTAL PROTEIN: 7.5 G/DL (ref 6.4–8.3)
TURBIDITY: CLEAR
URINE HGB: ABNORMAL
UROBILINOGEN, URINE: NORMAL
WBC # BLD: 6 K/UL (ref 3.5–11.3)
WBC UA: ABNORMAL /HPF (ref 0–5)

## 2023-01-27 PROCEDURE — 86140 C-REACTIVE PROTEIN: CPT

## 2023-01-27 PROCEDURE — 36415 COLL VENOUS BLD VENIPUNCTURE: CPT

## 2023-01-27 PROCEDURE — 85025 COMPLETE CBC W/AUTO DIFF WBC: CPT

## 2023-01-27 PROCEDURE — 6360000004 HC RX CONTRAST MEDICATION: Performed by: STUDENT IN AN ORGANIZED HEALTH CARE EDUCATION/TRAINING PROGRAM

## 2023-01-27 PROCEDURE — 80053 COMPREHEN METABOLIC PANEL: CPT

## 2023-01-27 PROCEDURE — 81001 URINALYSIS AUTO W/SCOPE: CPT

## 2023-01-27 PROCEDURE — 74177 CT ABD & PELVIS W/CONTRAST: CPT

## 2023-01-27 RX ORDER — CIPROFLOXACIN 500 MG/1
500 TABLET, FILM COATED ORAL 2 TIMES DAILY
Qty: 20 TABLET | Refills: 0 | Status: SHIPPED | OUTPATIENT
Start: 2023-01-27 | End: 2023-02-06

## 2023-01-27 RX ORDER — METRONIDAZOLE 500 MG/1
500 TABLET ORAL 3 TIMES DAILY
Qty: 30 TABLET | Refills: 0 | Status: SHIPPED | OUTPATIENT
Start: 2023-01-27 | End: 2023-02-06

## 2023-01-27 RX ADMIN — IOPAMIDOL 75 ML: 755 INJECTION, SOLUTION INTRAVENOUS at 11:59

## 2023-01-27 NOTE — TELEPHONE ENCOUNTER
Ntfd pt of CT results of \"mild diverticulosis with suggestion pf early diverticulitis along with ascending colon\"    Pt agrees to start or keep on hand cirpo and flagyl     [facility] [place]   Meds pended please double check dosing

## 2023-04-26 RX ORDER — EZETIMIBE 10 MG/1
TABLET ORAL
Qty: 90 TABLET | Refills: 1 | Status: SHIPPED | OUTPATIENT
Start: 2023-04-26

## 2023-04-26 RX ORDER — METFORMIN HYDROCHLORIDE 500 MG/1
TABLET, EXTENDED RELEASE ORAL
Qty: 90 TABLET | Refills: 1 | Status: SHIPPED | OUTPATIENT
Start: 2023-04-26

## 2023-06-22 DIAGNOSIS — E78.2 MIXED HYPERLIPIDEMIA: ICD-10-CM

## 2023-06-23 RX ORDER — ATORVASTATIN CALCIUM 10 MG/1
10 TABLET, FILM COATED ORAL DAILY
Qty: 90 TABLET | Refills: 3 | OUTPATIENT
Start: 2023-06-23

## 2023-07-12 ENCOUNTER — HOSPITAL ENCOUNTER (OUTPATIENT)
Age: 48
Discharge: HOME | End: 2023-07-12
Payer: COMMERCIAL

## 2023-07-12 DIAGNOSIS — E78.5 HYPERLIPIDEMIA, UNSPECIFIED HYPERLIPIDEMIA TYPE: ICD-10-CM

## 2023-07-12 DIAGNOSIS — E11.9 TYPE 2 DIABETES MELLITUS WITHOUT COMPLICATION, WITHOUT LONG-TERM CURRENT USE OF INSULIN (HCC): ICD-10-CM

## 2023-07-12 DIAGNOSIS — I10 PRIMARY HYPERTENSION: ICD-10-CM

## 2023-07-12 LAB
ALBUMIN SERPL-MCNC: 4.7 G/DL (ref 3.5–5.2)
ALBUMIN/GLOB SERPL: 1.6 {RATIO} (ref 1–2.5)
ALP SERPL-CCNC: 65 U/L (ref 35–104)
ALT SERPL-CCNC: 126 U/L (ref 5–33)
ANION GAP SERPL CALCULATED.3IONS-SCNC: 15 MMOL/L (ref 9–17)
AST SERPL-CCNC: 106 U/L
BASOPHILS # BLD: <0.03 K/UL (ref 0–0.2)
BASOPHILS NFR BLD: 0 % (ref 0–2)
BILIRUB SERPL-MCNC: 0.4 MG/DL (ref 0.3–1.2)
BUN SERPL-MCNC: 15 MG/DL (ref 6–20)
BUN/CREAT SERPL: 25 (ref 9–20)
CALCIUM SERPL-MCNC: 10.2 MG/DL (ref 8.6–10.4)
CHLORIDE SERPL-SCNC: 98 MMOL/L (ref 98–107)
CHOLEST SERPL-MCNC: 293 MG/DL
CHOLESTEROL/HDL RATIO: 5.9
CO2 SERPL-SCNC: 21 MMOL/L (ref 20–31)
CREAT SERPL-MCNC: 0.6 MG/DL (ref 0.5–0.9)
CREAT UR-MCNC: 148.4 MG/DL (ref 28–217)
EOSINOPHIL # BLD: 0.09 K/UL (ref 0–0.44)
EOSINOPHILS RELATIVE PERCENT: 2 % (ref 1–4)
ERYTHROCYTE [DISTWIDTH] IN BLOOD BY AUTOMATED COUNT: 12.3 % (ref 11.8–14.4)
GFR SERPL CREATININE-BSD FRML MDRD: >60 ML/MIN/1.73M2
GLUCOSE SERPL-MCNC: 126 MG/DL (ref 70–99)
HCT VFR BLD AUTO: 43.9 % (ref 36.3–47.1)
HDLC SERPL-MCNC: 50 MG/DL
HGB BLD-MCNC: 15.6 G/DL (ref 11.9–15.1)
IMM GRANULOCYTES # BLD AUTO: <0.03 K/UL (ref 0–0.3)
IMM GRANULOCYTES NFR BLD: 0 %
LDLC SERPL CALC-MCNC: ABNORMAL MG/DL (ref 0–130)
LDLC SERPL DIRECT ASSAY-MCNC: 176 MG/DL
LYMPHOCYTES # BLD: 34 % (ref 24–43)
LYMPHOCYTES NFR BLD: 2.09 K/UL (ref 1.1–3.7)
MCH RBC QN AUTO: 33.5 PG (ref 25.2–33.5)
MCHC RBC AUTO-ENTMCNC: 35.5 G/DL (ref 28.4–34.8)
MCV RBC AUTO: 94.2 FL (ref 82.6–102.9)
MICROALBUMIN UR-MCNC: 14 MG/L
MICROALBUMIN/CREAT UR-RTO: 9 MCG/MG CREAT
MONOCYTES NFR BLD: 0.47 K/UL (ref 0.1–1.2)
MONOCYTES NFR BLD: 8 % (ref 3–12)
NEUTROPHILS NFR BLD: 56 % (ref 36–65)
NEUTS SEG NFR BLD: 3.4 K/UL (ref 1.5–8.1)
NRBC BLD-RTO: 0 PER 100 WBC
PLATELET # BLD AUTO: 230 K/UL (ref 138–453)
PMV BLD AUTO: 9.7 FL (ref 8.1–13.5)
POTASSIUM SERPL-SCNC: 4.5 MMOL/L (ref 3.7–5.3)
PROT SERPL-MCNC: 7.6 G/DL (ref 6.4–8.3)
RBC # BLD AUTO: 4.66 M/UL (ref 3.95–5.11)
SODIUM SERPL-SCNC: 134 MMOL/L (ref 135–144)
TRIGL SERPL-MCNC: 575 MG/DL
WBC OTHER # BLD: 6.1 K/UL (ref 3.5–11.3)

## 2023-07-12 PROCEDURE — 80061 LIPID PANEL: CPT

## 2023-07-12 PROCEDURE — 80053 COMPREHEN METABOLIC PANEL: CPT

## 2023-07-12 PROCEDURE — 82570 ASSAY OF URINE CREATININE: CPT

## 2023-07-12 PROCEDURE — 85027 COMPLETE CBC AUTOMATED: CPT

## 2023-07-12 PROCEDURE — 83036 HEMOGLOBIN GLYCOSYLATED A1C: CPT

## 2023-07-12 PROCEDURE — 82043 UR ALBUMIN QUANTITATIVE: CPT

## 2023-07-12 PROCEDURE — 83721 ASSAY OF BLOOD LIPOPROTEIN: CPT

## 2023-07-13 ENCOUNTER — OFFICE VISIT (OUTPATIENT)
Dept: PRIMARY CARE CLINIC | Age: 48
End: 2023-07-13
Payer: COMMERCIAL

## 2023-07-13 VITALS
DIASTOLIC BLOOD PRESSURE: 80 MMHG | SYSTOLIC BLOOD PRESSURE: 118 MMHG | HEIGHT: 66 IN | OXYGEN SATURATION: 95 % | RESPIRATION RATE: 18 BRPM | WEIGHT: 197 LBS | HEART RATE: 85 BPM | BODY MASS INDEX: 31.66 KG/M2

## 2023-07-13 DIAGNOSIS — E11.9 TYPE 2 DIABETES MELLITUS WITHOUT COMPLICATION, WITHOUT LONG-TERM CURRENT USE OF INSULIN (HCC): Primary | ICD-10-CM

## 2023-07-13 DIAGNOSIS — E78.2 MIXED HYPERLIPIDEMIA: ICD-10-CM

## 2023-07-13 DIAGNOSIS — K59.00 CONSTIPATION, UNSPECIFIED CONSTIPATION TYPE: ICD-10-CM

## 2023-07-13 DIAGNOSIS — I10 PRIMARY HYPERTENSION: ICD-10-CM

## 2023-07-13 DIAGNOSIS — R10.9 ABDOMINAL PAIN, UNSPECIFIED ABDOMINAL LOCATION: ICD-10-CM

## 2023-07-13 LAB
EST. AVERAGE GLUCOSE BLD GHB EST-MCNC: 111 MG/DL
HBA1C MFR BLD: 5.5 % (ref 4–6)

## 2023-07-13 PROCEDURE — 3074F SYST BP LT 130 MM HG: CPT | Performed by: STUDENT IN AN ORGANIZED HEALTH CARE EDUCATION/TRAINING PROGRAM

## 2023-07-13 PROCEDURE — 99214 OFFICE O/P EST MOD 30 MIN: CPT | Performed by: STUDENT IN AN ORGANIZED HEALTH CARE EDUCATION/TRAINING PROGRAM

## 2023-07-13 PROCEDURE — 3079F DIAST BP 80-89 MM HG: CPT | Performed by: STUDENT IN AN ORGANIZED HEALTH CARE EDUCATION/TRAINING PROGRAM

## 2023-07-13 PROCEDURE — 3044F HG A1C LEVEL LT 7.0%: CPT | Performed by: STUDENT IN AN ORGANIZED HEALTH CARE EDUCATION/TRAINING PROGRAM

## 2023-07-13 RX ORDER — ATORVASTATIN CALCIUM 40 MG/1
40 TABLET, FILM COATED ORAL DAILY
Qty: 90 TABLET | Refills: 0 | Status: SHIPPED | OUTPATIENT
Start: 2023-07-13

## 2023-07-24 DIAGNOSIS — E11.9 TYPE 2 DIABETES MELLITUS WITHOUT COMPLICATION, WITHOUT LONG-TERM CURRENT USE OF INSULIN (HCC): Primary | ICD-10-CM

## 2023-07-24 RX ORDER — DULAGLUTIDE 0.75 MG/.5ML
0.75 INJECTION, SOLUTION SUBCUTANEOUS WEEKLY
Qty: 4 ADJUSTABLE DOSE PRE-FILLED PEN SYRINGE | Refills: 2 | Status: SHIPPED | OUTPATIENT
Start: 2023-07-24

## 2023-07-24 RX ORDER — DULAGLUTIDE 0.75 MG/.5ML
0.75 INJECTION, SOLUTION SUBCUTANEOUS WEEKLY
Qty: 2 ML | Refills: 3 | Status: CANCELLED | OUTPATIENT
Start: 2023-07-24

## 2023-07-24 NOTE — TELEPHONE ENCOUNTER
Patient needs her medication to be linked with the diagnosis of diabetes, as she got a letter from insurance stating it was not covered. Medication pended for physician.

## 2023-10-03 DIAGNOSIS — E78.2 MIXED HYPERLIPIDEMIA: ICD-10-CM

## 2023-10-03 RX ORDER — ATORVASTATIN CALCIUM 40 MG/1
40 TABLET, FILM COATED ORAL DAILY
Qty: 90 TABLET | Refills: 0 | Status: SHIPPED | OUTPATIENT
Start: 2023-10-03

## 2023-10-06 ENCOUNTER — HOSPITAL ENCOUNTER (OUTPATIENT)
Dept: ULTRASOUND IMAGING | Age: 48
Discharge: HOME | End: 2023-10-06
Payer: COMMERCIAL

## 2023-10-06 DIAGNOSIS — R10.9 ABDOMINAL PAIN, UNSPECIFIED ABDOMINAL LOCATION: ICD-10-CM

## 2023-10-06 PROCEDURE — 76830 TRANSVAGINAL US NON-OB: CPT

## 2023-10-16 ENCOUNTER — OFFICE VISIT (OUTPATIENT)
Dept: OBGYN | Age: 48
End: 2023-10-16
Payer: COMMERCIAL

## 2023-10-16 VITALS
BODY MASS INDEX: 32.14 KG/M2 | DIASTOLIC BLOOD PRESSURE: 82 MMHG | SYSTOLIC BLOOD PRESSURE: 124 MMHG | WEIGHT: 200 LBS | HEIGHT: 66 IN

## 2023-10-16 DIAGNOSIS — N95.1 PERIMENOPAUSAL: ICD-10-CM

## 2023-10-16 DIAGNOSIS — N81.11 MIDLINE CYSTOCELE: ICD-10-CM

## 2023-10-16 DIAGNOSIS — Z01.419 WELL WOMAN EXAM WITH ROUTINE GYNECOLOGICAL EXAM: Primary | ICD-10-CM

## 2023-10-16 DIAGNOSIS — Z12.31 ENCOUNTER FOR SCREENING MAMMOGRAM FOR MALIGNANT NEOPLASM OF BREAST: ICD-10-CM

## 2023-10-16 PROCEDURE — 99396 PREV VISIT EST AGE 40-64: CPT | Performed by: ADVANCED PRACTICE MIDWIFE

## 2023-10-16 PROCEDURE — 3074F SYST BP LT 130 MM HG: CPT | Performed by: ADVANCED PRACTICE MIDWIFE

## 2023-10-16 PROCEDURE — 3079F DIAST BP 80-89 MM HG: CPT | Performed by: ADVANCED PRACTICE MIDWIFE

## 2023-10-16 NOTE — PROGRESS NOTES
the left  fundus. Small simple appearing nabothian cysts in the cervix. Endometrial stripe: Endometrial stripe is within normal limits. Right Ovary: Not seen. Left Ovary:  Left ovary is within normal limits. Free Fluid: No evidence of free fluid. IMPRESSION:  1. Fibroid uterus. 2.  Normal left ovary. 3.  Right ovary is not seen. 4.  Endometrium measures 5 mm in thickness, correlate with patient's stage of  menstrual cycle. Specimen Collected: 10/06/23 17:33 EDT Last Resulted: 10/06/23 17:55 EDT                               Assessment and Plan          Diagnosis Orders   1. Well woman exam with routine gynecological exam        2. Encounter for screening mammogram for malignant neoplasm of breast  [facility] DIGITAL SCREEN BILATERAL      3. Midline cystocele              Patient declines bladder consultation at this point in time. Patient states she would like to save up PTO. Patient also has a bladder sling from her previous surgery      I am having Andres Ear maintain her docusate sodium, OptiChamber Sangita, loratadine, Multiple Vitamins-Minerals (EQ MULTIVITAMINS ADULT GUMMY PO), vitamin D, blood glucose monitor kit and supplies, blood glucose test strips, Lancets 28G, famotidine, ASPIRIN LOW DOSE PO, albuterol sulfate HFA, metoprolol succinate, ondansetron, metFORMIN, ezetimibe, sertraline, linaCLOtide, Trulicity, and atorvastatin. Return for set up routine mammo. She was also counseled on her preventative health maintenance recommendations and follow-up. There are no Patient Instructions on file for this visit.     DYAN Ridley CNM,10/16/2023 3:22 PM

## 2023-10-30 DIAGNOSIS — E11.9 TYPE 2 DIABETES MELLITUS WITHOUT COMPLICATION, WITHOUT LONG-TERM CURRENT USE OF INSULIN (HCC): ICD-10-CM

## 2023-10-31 RX ORDER — DULAGLUTIDE 0.75 MG/.5ML
INJECTION, SOLUTION SUBCUTANEOUS
Qty: 2 ML | Refills: 3 | Status: SHIPPED | OUTPATIENT
Start: 2023-10-31

## 2023-10-31 RX ORDER — EZETIMIBE 10 MG/1
TABLET ORAL
Qty: 90 TABLET | Refills: 1 | Status: SHIPPED | OUTPATIENT
Start: 2023-10-31

## 2023-11-13 RX ORDER — METFORMIN HYDROCHLORIDE 500 MG/1
TABLET, EXTENDED RELEASE ORAL
Qty: 90 TABLET | Refills: 1 | Status: SHIPPED | OUTPATIENT
Start: 2023-11-13

## 2023-12-11 ENCOUNTER — HOSPITAL ENCOUNTER (OUTPATIENT)
Dept: WOMENS IMAGING | Age: 48
Discharge: HOME | End: 2023-12-13
Attending: ADVANCED PRACTICE MIDWIFE
Payer: COMMERCIAL

## 2023-12-11 DIAGNOSIS — Z12.31 ENCOUNTER FOR SCREENING MAMMOGRAM FOR MALIGNANT NEOPLASM OF BREAST: ICD-10-CM

## 2023-12-11 PROCEDURE — 77063 BREAST TOMOSYNTHESIS BI: CPT

## 2023-12-14 RX ORDER — METOPROLOL SUCCINATE 25 MG/1
25 TABLET, EXTENDED RELEASE ORAL DAILY
Qty: 90 TABLET | Refills: 3 | Status: SHIPPED | OUTPATIENT
Start: 2023-12-14

## 2023-12-27 DIAGNOSIS — R11.0 NAUSEA: Primary | ICD-10-CM

## 2023-12-27 RX ORDER — ONDANSETRON 4 MG/1
4 TABLET, FILM COATED ORAL EVERY 6 HOURS PRN
Qty: 30 TABLET | Refills: 0 | Status: SHIPPED | OUTPATIENT
Start: 2023-12-27

## 2024-01-25 DIAGNOSIS — E78.2 MIXED HYPERLIPIDEMIA: ICD-10-CM

## 2024-01-25 RX ORDER — ATORVASTATIN CALCIUM 40 MG/1
40 TABLET, FILM COATED ORAL DAILY
Qty: 90 TABLET | Refills: 0 | Status: SHIPPED | OUTPATIENT
Start: 2024-01-25

## 2024-02-07 ENCOUNTER — HOSPITAL ENCOUNTER (OUTPATIENT)
Age: 49
Discharge: HOME | End: 2024-02-07
Payer: COMMERCIAL

## 2024-02-07 LAB
ALBUMIN SERPL-MCNC: 4.7 G/DL (ref 3.5–5.2)
ALBUMIN/GLOB SERPL: 1.8 {RATIO} (ref 1–2.5)
ALP SERPL-CCNC: 68 U/L (ref 35–104)
ALT SERPL-CCNC: 126 U/L (ref 5–33)
ANION GAP SERPL CALCULATED.3IONS-SCNC: 13 MMOL/L (ref 9–17)
AST SERPL-CCNC: 87 U/L
BASOPHILS # BLD: <0.03 K/UL (ref 0–0.2)
BASOPHILS NFR BLD: 0 % (ref 0–2)
BILIRUB SERPL-MCNC: 0.6 MG/DL (ref 0.3–1.2)
BUN SERPL-MCNC: 10 MG/DL (ref 6–20)
BUN/CREAT SERPL: 20 (ref 9–20)
CALCIUM SERPL-MCNC: 9.5 MG/DL (ref 8.6–10.4)
CHLORIDE SERPL-SCNC: 99 MMOL/L (ref 98–107)
CHOLEST SERPL-MCNC: 225 MG/DL (ref 0–199)
CHOLESTEROL/HDL RATIO: 5
CO2 SERPL-SCNC: 24 MMOL/L (ref 20–31)
CREAT SERPL-MCNC: 0.5 MG/DL (ref 0.5–0.9)
EOSINOPHIL # BLD: 0.14 K/UL (ref 0–0.44)
EOSINOPHILS RELATIVE PERCENT: 3 % (ref 1–4)
ERYTHROCYTE [DISTWIDTH] IN BLOOD BY AUTOMATED COUNT: 12.6 % (ref 11.8–14.4)
EST. AVERAGE GLUCOSE BLD GHB EST-MCNC: 117 MG/DL
GFR SERPL CREATININE-BSD FRML MDRD: >60 ML/MIN/1.73M2
GLUCOSE SERPL-MCNC: 139 MG/DL (ref 70–99)
HBA1C MFR BLD: 5.7 % (ref 4–6)
HCT VFR BLD AUTO: 42.9 % (ref 36.3–47.1)
HDLC SERPL-MCNC: 46 MG/DL
HGB BLD-MCNC: 14.6 G/DL (ref 11.9–15.1)
IMM GRANULOCYTES # BLD AUTO: <0.03 K/UL (ref 0–0.3)
IMM GRANULOCYTES NFR BLD: 0 %
LDLC SERPL CALC-MCNC: 127 MG/DL (ref 0–100)
LYMPHOCYTES NFR BLD: 1.66 K/UL (ref 1.1–3.7)
LYMPHOCYTES RELATIVE PERCENT: 32 % (ref 24–43)
MCH RBC QN AUTO: 32.7 PG (ref 25.2–33.5)
MCHC RBC AUTO-ENTMCNC: 34 G/DL (ref 28.4–34.8)
MCV RBC AUTO: 96 FL (ref 82.6–102.9)
MONOCYTES NFR BLD: 0.62 K/UL (ref 0.1–1.2)
MONOCYTES NFR BLD: 12 % (ref 3–12)
NEUTROPHILS NFR BLD: 53 % (ref 36–65)
NEUTS SEG NFR BLD: 2.74 K/UL (ref 1.5–8.1)
NRBC BLD-RTO: 0 PER 100 WBC
PLATELET # BLD AUTO: 217 K/UL (ref 138–453)
PMV BLD AUTO: 9.8 FL (ref 8.1–13.5)
POTASSIUM SERPL-SCNC: 3.9 MMOL/L (ref 3.7–5.3)
PROT SERPL-MCNC: 7.3 G/DL (ref 6.4–8.3)
RBC # BLD AUTO: 4.47 M/UL (ref 3.95–5.11)
SODIUM SERPL-SCNC: 136 MMOL/L (ref 135–144)
TRIGL SERPL-MCNC: 259 MG/DL
VLDLC SERPL CALC-MCNC: 52 MG/DL
WBC OTHER # BLD: 5.2 K/UL (ref 3.5–11.3)

## 2024-02-07 PROCEDURE — 80053 COMPREHEN METABOLIC PANEL: CPT

## 2024-02-07 PROCEDURE — 80061 LIPID PANEL: CPT

## 2024-02-07 PROCEDURE — 36415 COLL VENOUS BLD VENIPUNCTURE: CPT

## 2024-02-07 PROCEDURE — 83036 HEMOGLOBIN GLYCOSYLATED A1C: CPT

## 2024-02-07 PROCEDURE — 85025 COMPLETE CBC W/AUTO DIFF WBC: CPT

## 2024-02-08 ENCOUNTER — OFFICE VISIT (OUTPATIENT)
Dept: PRIMARY CARE CLINIC | Age: 49
End: 2024-02-08
Payer: COMMERCIAL

## 2024-02-08 VITALS
HEART RATE: 79 BPM | BODY MASS INDEX: 31.18 KG/M2 | WEIGHT: 194 LBS | DIASTOLIC BLOOD PRESSURE: 78 MMHG | OXYGEN SATURATION: 97 % | SYSTOLIC BLOOD PRESSURE: 132 MMHG | HEIGHT: 66 IN

## 2024-02-08 DIAGNOSIS — I10 PRIMARY HYPERTENSION: ICD-10-CM

## 2024-02-08 DIAGNOSIS — M79.671 RIGHT FOOT PAIN: ICD-10-CM

## 2024-02-08 DIAGNOSIS — E11.9 TYPE 2 DIABETES MELLITUS WITHOUT COMPLICATION, WITHOUT LONG-TERM CURRENT USE OF INSULIN (HCC): Primary | ICD-10-CM

## 2024-02-08 DIAGNOSIS — M54.2 NECK PAIN: ICD-10-CM

## 2024-02-08 DIAGNOSIS — E11.9 TYPE 2 DIABETES MELLITUS WITHOUT COMPLICATION, WITHOUT LONG-TERM CURRENT USE OF INSULIN (HCC): ICD-10-CM

## 2024-02-08 DIAGNOSIS — E78.2 MIXED HYPERLIPIDEMIA: ICD-10-CM

## 2024-02-08 DIAGNOSIS — E16.2 HYPOGLYCEMIA: ICD-10-CM

## 2024-02-08 PROCEDURE — 3078F DIAST BP <80 MM HG: CPT | Performed by: STUDENT IN AN ORGANIZED HEALTH CARE EDUCATION/TRAINING PROGRAM

## 2024-02-08 PROCEDURE — 3075F SYST BP GE 130 - 139MM HG: CPT | Performed by: STUDENT IN AN ORGANIZED HEALTH CARE EDUCATION/TRAINING PROGRAM

## 2024-02-08 PROCEDURE — 99214 OFFICE O/P EST MOD 30 MIN: CPT | Performed by: STUDENT IN AN ORGANIZED HEALTH CARE EDUCATION/TRAINING PROGRAM

## 2024-02-08 PROCEDURE — 3044F HG A1C LEVEL LT 7.0%: CPT | Performed by: STUDENT IN AN ORGANIZED HEALTH CARE EDUCATION/TRAINING PROGRAM

## 2024-02-08 RX ORDER — DULAGLUTIDE 0.75 MG/.5ML
INJECTION, SOLUTION SUBCUTANEOUS
Qty: 2 ML | Refills: 3 | Status: SHIPPED | OUTPATIENT
Start: 2024-02-08

## 2024-02-08 RX ORDER — BLOOD-GLUCOSE SENSOR
1 EACH MISCELLANEOUS
Qty: 1 EACH | Refills: 5 | Status: SHIPPED | OUTPATIENT
Start: 2024-02-08

## 2024-02-08 RX ORDER — ACYCLOVIR 400 MG/1
1 TABLET ORAL
Qty: 6 EACH | Refills: 5 | Status: SHIPPED | OUTPATIENT
Start: 2024-02-08 | End: 2024-05-08

## 2024-02-08 RX ORDER — CYCLOBENZAPRINE HCL 10 MG
10 TABLET ORAL 3 TIMES DAILY PRN
Qty: 42 TABLET | Refills: 0 | Status: SHIPPED | OUTPATIENT
Start: 2024-02-08 | End: 2024-02-22

## 2024-02-08 SDOH — ECONOMIC STABILITY: FOOD INSECURITY: WITHIN THE PAST 12 MONTHS, THE FOOD YOU BOUGHT JUST DIDN'T LAST AND YOU DIDN'T HAVE MONEY TO GET MORE.: NEVER TRUE

## 2024-02-08 SDOH — ECONOMIC STABILITY: FOOD INSECURITY: WITHIN THE PAST 12 MONTHS, YOU WORRIED THAT YOUR FOOD WOULD RUN OUT BEFORE YOU GOT MONEY TO BUY MORE.: NEVER TRUE

## 2024-02-08 SDOH — ECONOMIC STABILITY: HOUSING INSECURITY
IN THE LAST 12 MONTHS, WAS THERE A TIME WHEN YOU DID NOT HAVE A STEADY PLACE TO SLEEP OR SLEPT IN A SHELTER (INCLUDING NOW)?: NO

## 2024-02-08 SDOH — ECONOMIC STABILITY: INCOME INSECURITY: HOW HARD IS IT FOR YOU TO PAY FOR THE VERY BASICS LIKE FOOD, HOUSING, MEDICAL CARE, AND HEATING?: NOT HARD AT ALL

## 2024-02-08 SDOH — ECONOMIC STABILITY: TRANSPORTATION INSECURITY
IN THE PAST 12 MONTHS, HAS LACK OF TRANSPORTATION KEPT YOU FROM MEETINGS, WORK, OR FROM GETTING THINGS NEEDED FOR DAILY LIVING?: NO

## 2024-02-08 ASSESSMENT — PATIENT HEALTH QUESTIONNAIRE - PHQ9
SUM OF ALL RESPONSES TO PHQ QUESTIONS 1-9: 0
1. LITTLE INTEREST OR PLEASURE IN DOING THINGS: 0
SUM OF ALL RESPONSES TO PHQ QUESTIONS 1-9: 0
SUM OF ALL RESPONSES TO PHQ9 QUESTIONS 1 & 2: 0
DEPRESSION UNABLE TO ASSESS: PT REFUSES
SUM OF ALL RESPONSES TO PHQ QUESTIONS 1-9: 0
SUM OF ALL RESPONSES TO PHQ QUESTIONS 1-9: 0
2. FEELING DOWN, DEPRESSED OR HOPELESS: 0

## 2024-02-08 NOTE — PROGRESS NOTES
[facility] [place] [facility]  [address] [place] [address] , [address]  [place], [place], [address]    Li Melchor is a 48 y.o. female with  has a past medical history of Allergic rhinitis, Anxiety, Asthma, COVID-19, Elevated liver enzymes, Fatty liver disease, nonalcoholic, Rufus's syndrome, Hyperlipidemia, Hypertension, Migraine, Obesity due to excess calories, PIH (pregnancy induced hypertension), Pneumonia, and Urinary incontinence.  Presented to the office today for:  Chief Complaint   Patient presents with    Diabetes    Hyperlipidemia    Hypertension       Assessment/Plan   1. Type 2 diabetes mellitus without complication, without long-term current use of insulin (HCC)  -     Continuous Blood Gluc Sensor (FREESTYLE ANDREA 3 SENSOR) MISC; 1 each by Does not apply route every 14 days, Disp-1 each, R-5Normal  -     Hemoglobin A1C; Future  -     CBC with Auto Differential; Future  -     Comprehensive Metabolic Panel; Future  -      DIABETES FOOT EXAM  2. Hypoglycemia  3. Mixed hyperlipidemia  -     Lipid Panel; Future  4. Primary hypertension  5. Right foot pain  6. Neck pain  Return in about 6 months (around 8/8/2024) for F/U Meds/T2DM.    T2DM - continue w/ trulicity at the current dose for now, d/c metformin  CGM ordered today given hypoglycemia, blood glucose control.  Continue w/ zetia 10mg/lipitor 40mg  HTN - continue w/ Toprol, well tolerated  We discussed some of the etiologies and natural histories. We discussed the various treatment alternatives including anti-inflammatory medications, physical therapy, injections, further imaging studies and as a last resort surgery.  Flexeril PRN  If ear symptoms persist  - consideration for otic ear drops.       All patient questions answered.  Pt voiced understanding.   Medications Discontinued During This Encounter   Medication Reason    docusate sodium (COLACE) 100 MG capsule ERROR    ondansetron (ZOFRAN-ODT) 4 MG disintegrating tablet ERROR    Spacer/Aero-Holding

## 2024-04-23 DIAGNOSIS — E11.9 TYPE 2 DIABETES MELLITUS WITHOUT COMPLICATION, WITHOUT LONG-TERM CURRENT USE OF INSULIN (HCC): ICD-10-CM

## 2024-04-23 RX ORDER — DULAGLUTIDE 0.75 MG/.5ML
INJECTION, SOLUTION SUBCUTANEOUS
Qty: 2 ML | Refills: 3 | Status: SHIPPED | OUTPATIENT
Start: 2024-04-23

## 2024-05-02 DIAGNOSIS — E16.2 HYPOGLYCEMIA: ICD-10-CM

## 2024-05-02 DIAGNOSIS — E11.9 TYPE 2 DIABETES MELLITUS WITHOUT COMPLICATION, WITHOUT LONG-TERM CURRENT USE OF INSULIN (HCC): ICD-10-CM

## 2024-05-02 DIAGNOSIS — E78.2 MIXED HYPERLIPIDEMIA: ICD-10-CM

## 2024-05-02 RX ORDER — DULAGLUTIDE 0.75 MG/.5ML
INJECTION, SOLUTION SUBCUTANEOUS
Qty: 2 ML | Refills: 3 | Status: SHIPPED | OUTPATIENT
Start: 2024-05-02

## 2024-05-02 RX ORDER — ATORVASTATIN CALCIUM 40 MG/1
40 TABLET, FILM COATED ORAL DAILY
Qty: 90 TABLET | Refills: 1 | Status: SHIPPED | OUTPATIENT
Start: 2024-05-02

## 2024-05-02 RX ORDER — ACYCLOVIR 400 MG/1
1 TABLET ORAL
Qty: 6 EACH | Refills: 5 | Status: SHIPPED | OUTPATIENT
Start: 2024-05-02 | End: 2024-07-31

## 2024-05-02 RX ORDER — EZETIMIBE 10 MG/1
TABLET ORAL
Qty: 90 TABLET | Refills: 1 | Status: SHIPPED | OUTPATIENT
Start: 2024-05-02

## 2024-05-02 RX ORDER — METOPROLOL SUCCINATE 25 MG/1
25 TABLET, EXTENDED RELEASE ORAL DAILY
Qty: 90 TABLET | Refills: 1 | Status: SHIPPED | OUTPATIENT
Start: 2024-05-02

## 2024-05-29 DIAGNOSIS — J06.9 UPPER RESPIRATORY TRACT INFECTION, UNSPECIFIED TYPE: Primary | ICD-10-CM

## 2024-05-29 RX ORDER — CEPHALEXIN 500 MG/1
500 CAPSULE ORAL 3 TIMES DAILY
Qty: 30 CAPSULE | Refills: 0 | Status: SHIPPED | OUTPATIENT
Start: 2024-05-29 | End: 2024-06-08

## 2024-05-29 RX ORDER — METHYLPREDNISOLONE 4 MG/1
TABLET ORAL
Qty: 1 KIT | Refills: 0 | Status: SHIPPED | OUTPATIENT
Start: 2024-05-29 | End: 2024-06-04

## 2024-08-20 ENCOUNTER — HOSPITAL ENCOUNTER (OUTPATIENT)
Age: 49
Discharge: HOME | End: 2024-08-20
Payer: COMMERCIAL

## 2024-08-20 DIAGNOSIS — E11.9 TYPE 2 DIABETES MELLITUS WITHOUT COMPLICATION, WITHOUT LONG-TERM CURRENT USE OF INSULIN (HCC): ICD-10-CM

## 2024-08-20 DIAGNOSIS — E78.2 MIXED HYPERLIPIDEMIA: ICD-10-CM

## 2024-08-20 LAB
ALBUMIN SERPL-MCNC: 4.6 G/DL (ref 3.5–5.2)
ALBUMIN/GLOB SERPL: 1.9 {RATIO} (ref 1–2.5)
ALP SERPL-CCNC: 72 U/L (ref 35–104)
ALT SERPL-CCNC: 89 U/L (ref 5–33)
ANION GAP SERPL CALCULATED.3IONS-SCNC: 13 MMOL/L (ref 9–17)
AST SERPL-CCNC: 87 U/L
BASOPHILS # BLD: <0.03 K/UL (ref 0–0.2)
BASOPHILS NFR BLD: 0 % (ref 0–2)
BILIRUB SERPL-MCNC: 0.6 MG/DL (ref 0.3–1.2)
BUN SERPL-MCNC: 13 MG/DL (ref 6–20)
BUN/CREAT SERPL: 22 (ref 9–20)
CALCIUM SERPL-MCNC: 9.6 MG/DL (ref 8.6–10.4)
CHLORIDE SERPL-SCNC: 99 MMOL/L (ref 98–107)
CHOLEST SERPL-MCNC: 166 MG/DL (ref 0–199)
CHOLESTEROL/HDL RATIO: 4
CO2 SERPL-SCNC: 24 MMOL/L (ref 20–31)
CREAT SERPL-MCNC: 0.6 MG/DL (ref 0.5–0.9)
EOSINOPHIL # BLD: 0.15 K/UL (ref 0–0.44)
EOSINOPHILS RELATIVE PERCENT: 3 % (ref 1–4)
ERYTHROCYTE [DISTWIDTH] IN BLOOD BY AUTOMATED COUNT: 12.3 % (ref 11.8–14.4)
EST. AVERAGE GLUCOSE BLD GHB EST-MCNC: 137 MG/DL
GFR, ESTIMATED: >90 ML/MIN/1.73M2
GLUCOSE SERPL-MCNC: 125 MG/DL (ref 70–99)
HBA1C MFR BLD: 6.4 % (ref 4–6)
HCT VFR BLD AUTO: 41.1 % (ref 36.3–47.1)
HDLC SERPL-MCNC: 44 MG/DL
HGB BLD-MCNC: 14.4 G/DL (ref 11.9–15.1)
IMM GRANULOCYTES # BLD AUTO: <0.03 K/UL (ref 0–0.3)
IMM GRANULOCYTES NFR BLD: 0 %
LDLC SERPL CALC-MCNC: 50 MG/DL (ref 0–100)
LYMPHOCYTES NFR BLD: 2.01 K/UL (ref 1.1–3.7)
LYMPHOCYTES RELATIVE PERCENT: 35 % (ref 24–43)
MCH RBC QN AUTO: 33.1 PG (ref 25.2–33.5)
MCHC RBC AUTO-ENTMCNC: 35 G/DL (ref 28.4–34.8)
MCV RBC AUTO: 94.5 FL (ref 82.6–102.9)
MONOCYTES NFR BLD: 0.39 K/UL (ref 0.1–1.2)
MONOCYTES NFR BLD: 7 % (ref 3–12)
NEUTROPHILS NFR BLD: 55 % (ref 36–65)
NEUTS SEG NFR BLD: 3.13 K/UL (ref 1.5–8.1)
NRBC BLD-RTO: 0 PER 100 WBC
PLATELET # BLD AUTO: 194 K/UL (ref 138–453)
PMV BLD AUTO: 9.8 FL (ref 8.1–13.5)
POTASSIUM SERPL-SCNC: 4.4 MMOL/L (ref 3.7–5.3)
PROT SERPL-MCNC: 7 G/DL (ref 6.4–8.3)
RBC # BLD AUTO: 4.35 M/UL (ref 3.95–5.11)
SODIUM SERPL-SCNC: 136 MMOL/L (ref 135–144)
TRIGL SERPL-MCNC: 357 MG/DL
VLDLC SERPL CALC-MCNC: 71 MG/DL
WBC OTHER # BLD: 5.7 K/UL (ref 3.5–11.3)

## 2024-08-20 PROCEDURE — 36415 COLL VENOUS BLD VENIPUNCTURE: CPT

## 2024-08-20 PROCEDURE — 85025 COMPLETE CBC W/AUTO DIFF WBC: CPT

## 2024-08-20 PROCEDURE — 80061 LIPID PANEL: CPT

## 2024-08-20 PROCEDURE — 83036 HEMOGLOBIN GLYCOSYLATED A1C: CPT

## 2024-08-20 PROCEDURE — 80053 COMPREHEN METABOLIC PANEL: CPT

## 2024-08-22 ENCOUNTER — OFFICE VISIT (OUTPATIENT)
Dept: PRIMARY CARE CLINIC | Age: 49
End: 2024-08-22
Payer: COMMERCIAL

## 2024-08-22 VITALS
WEIGHT: 205 LBS | BODY MASS INDEX: 32.95 KG/M2 | OXYGEN SATURATION: 95 % | DIASTOLIC BLOOD PRESSURE: 72 MMHG | SYSTOLIC BLOOD PRESSURE: 120 MMHG | HEART RATE: 74 BPM | HEIGHT: 66 IN

## 2024-08-22 DIAGNOSIS — R11.0 NAUSEA: ICD-10-CM

## 2024-08-22 DIAGNOSIS — F41.9 ANXIETY: ICD-10-CM

## 2024-08-22 DIAGNOSIS — E11.9 TYPE 2 DIABETES MELLITUS WITHOUT COMPLICATION, WITHOUT LONG-TERM CURRENT USE OF INSULIN (HCC): ICD-10-CM

## 2024-08-22 DIAGNOSIS — E78.2 MIXED HYPERLIPIDEMIA: ICD-10-CM

## 2024-08-22 DIAGNOSIS — I10 PRIMARY HYPERTENSION: Primary | ICD-10-CM

## 2024-08-22 PROCEDURE — 3044F HG A1C LEVEL LT 7.0%: CPT | Performed by: STUDENT IN AN ORGANIZED HEALTH CARE EDUCATION/TRAINING PROGRAM

## 2024-08-22 PROCEDURE — G2211 COMPLEX E/M VISIT ADD ON: HCPCS | Performed by: STUDENT IN AN ORGANIZED HEALTH CARE EDUCATION/TRAINING PROGRAM

## 2024-08-22 PROCEDURE — 99214 OFFICE O/P EST MOD 30 MIN: CPT | Performed by: STUDENT IN AN ORGANIZED HEALTH CARE EDUCATION/TRAINING PROGRAM

## 2024-08-22 PROCEDURE — 3078F DIAST BP <80 MM HG: CPT | Performed by: STUDENT IN AN ORGANIZED HEALTH CARE EDUCATION/TRAINING PROGRAM

## 2024-08-22 PROCEDURE — 3074F SYST BP LT 130 MM HG: CPT | Performed by: STUDENT IN AN ORGANIZED HEALTH CARE EDUCATION/TRAINING PROGRAM

## 2024-08-22 RX ORDER — EZETIMIBE 10 MG/1
TABLET ORAL
Qty: 90 TABLET | Refills: 1 | Status: SHIPPED | OUTPATIENT
Start: 2024-08-22

## 2024-08-22 RX ORDER — DULAGLUTIDE 0.75 MG/.5ML
INJECTION, SOLUTION SUBCUTANEOUS
Qty: 2 ML | Refills: 3 | Status: CANCELLED | OUTPATIENT
Start: 2024-08-22

## 2024-08-22 RX ORDER — GLUCOSAMINE HCL/CHONDROITIN SU 500-400 MG
CAPSULE ORAL
Qty: 300 STRIP | Refills: 3 | Status: SHIPPED | OUTPATIENT
Start: 2024-08-22

## 2024-08-22 RX ORDER — METOPROLOL SUCCINATE 25 MG/1
25 TABLET, EXTENDED RELEASE ORAL DAILY
Qty: 90 TABLET | Refills: 1 | Status: SHIPPED | OUTPATIENT
Start: 2024-08-22

## 2024-08-22 RX ORDER — ACYCLOVIR 400 MG/1
1 TABLET ORAL
Qty: 2 EACH | Refills: 0 | Status: SHIPPED | COMMUNITY
Start: 2024-08-22

## 2024-08-22 RX ORDER — BLOOD-GLUCOSE SENSOR
1 EACH MISCELLANEOUS
Qty: 1 EACH | Refills: 5 | Status: SHIPPED | OUTPATIENT
Start: 2024-08-22

## 2024-08-22 RX ORDER — ATORVASTATIN CALCIUM 40 MG/1
40 TABLET, FILM COATED ORAL DAILY
Qty: 90 TABLET | Refills: 1 | Status: SHIPPED | OUTPATIENT
Start: 2024-08-22

## 2024-08-22 RX ORDER — DULAGLUTIDE 1.5 MG/.5ML
1.5 INJECTION, SOLUTION SUBCUTANEOUS WEEKLY
Qty: 4 ADJUSTABLE DOSE PRE-FILLED PEN SYRINGE | Refills: 0 | Status: SHIPPED | OUTPATIENT
Start: 2024-08-22

## 2024-08-22 RX ORDER — ONDANSETRON 4 MG/1
4 TABLET, FILM COATED ORAL EVERY 6 HOURS PRN
Qty: 42 TABLET | Refills: 0 | Status: SHIPPED | OUTPATIENT
Start: 2024-08-22 | End: 2024-09-05

## 2024-08-22 NOTE — PROGRESS NOTES
[facility]  [address] [place] [address] , [address]  [place], [place], [address]    Li Melchor is a 49 y.o. female with  has a past medical history of Allergic rhinitis, Anxiety, Asthma, COVID-19, Elevated liver enzymes, Fatty liver disease, nonalcoholic, Rufus's syndrome, Hyperlipidemia, Hypertension, Migraine, Obesity due to excess calories, PIH (pregnancy induced hypertension), Pneumonia, and Urinary incontinence.  Presented to the office today for:  Chief Complaint   Patient presents with    6 Month Follow-Up    Diabetes    Hyperlipidemia    Hypertension    Annual Exam     BE WELL screening for [facility]        Assessment/Plan   1. Primary hypertension  -     metoprolol succinate (TOPROL XL) 25 MG extended release tablet; Take 1 tablet by mouth daily, Disp-90 tablet, R-1Normal  -     CBC with Auto Differential; Future  -     Comprehensive Metabolic Panel; Future  2. Mixed hyperlipidemia  -     atorvastatin (LIPITOR) 40 MG tablet; Take 1 tablet by mouth daily, Disp-90 tablet, R-1Normal  -     ezetimibe (ZETIA) 10 MG tablet; TAKE ONE TABLET BY MOUTH ONCE A DAY, Disp-90 tablet, R-1Normal  -     Lipid Panel; Future  3. Type 2 diabetes mellitus without complication, without long-term current use of insulin (HCC)  -     blood glucose monitor kit and supplies; 1 kit by Other route 2 times daily Dispense prodigy meter per insurance coverage, Other, 2 TIMES DAILY Starting Thu 8/22/2024, Disp-1 kit, R-0, Normal  -     blood glucose monitor strips; Test 2 times a day & as needed for symptoms of irregular blood glucose. Dispense sufficient amount for indicated testing frequency plus additional to accommodate PRN testing needs also, dispense prodigy test strips per insurance coverage, Disp-300 strip, R-3, Normal  -     Continuous Glucose Sensor (FREESTYLE ANDREA 3 SENSOR) MISC; 1 each by Does not apply route every 14 days, Disp-1 each, R-5Normal  -     Continuous Glucose Sensor (DEXCOM G7 SENSOR) MISC; 1 each by

## 2024-08-26 DIAGNOSIS — E11.9 TYPE 2 DIABETES MELLITUS WITHOUT COMPLICATION, WITHOUT LONG-TERM CURRENT USE OF INSULIN (HCC): Primary | ICD-10-CM

## 2024-08-26 RX ORDER — LANCETS 30 GAUGE
1 EACH MISCELLANEOUS 2 TIMES DAILY
Qty: 300 EACH | Refills: 1 | Status: SHIPPED | OUTPATIENT
Start: 2024-08-26

## 2024-08-26 NOTE — TELEPHONE ENCOUNTER
Patient pharmacy called requesting an order for lancets. They received the orders for everything else except the lancets. Order pended.

## 2024-09-30 ENCOUNTER — TELEPHONE (OUTPATIENT)
Dept: PRIMARY CARE CLINIC | Age: 49
End: 2024-09-30

## 2024-10-01 DIAGNOSIS — E78.2 MIXED HYPERLIPIDEMIA: ICD-10-CM

## 2024-10-01 DIAGNOSIS — E11.9 TYPE 2 DIABETES MELLITUS WITHOUT COMPLICATION, WITHOUT LONG-TERM CURRENT USE OF INSULIN (HCC): ICD-10-CM

## 2024-10-01 DIAGNOSIS — F41.9 ANXIETY: ICD-10-CM

## 2024-10-01 RX ORDER — EZETIMIBE 10 MG/1
TABLET ORAL
Qty: 90 TABLET | Refills: 1 | OUTPATIENT
Start: 2024-10-01

## 2024-10-01 RX ORDER — ATORVASTATIN CALCIUM 40 MG/1
40 TABLET, FILM COATED ORAL DAILY
Qty: 90 TABLET | Refills: 1 | OUTPATIENT
Start: 2024-10-01

## 2024-10-08 DIAGNOSIS — E11.9 TYPE 2 DIABETES MELLITUS WITHOUT COMPLICATION, WITHOUT LONG-TERM CURRENT USE OF INSULIN (HCC): ICD-10-CM

## 2024-10-08 RX ORDER — DULAGLUTIDE 1.5 MG/.5ML
1.5 INJECTION, SOLUTION SUBCUTANEOUS WEEKLY
Qty: 4 ADJUSTABLE DOSE PRE-FILLED PEN SYRINGE | Refills: 1 | Status: SHIPPED | OUTPATIENT
Start: 2024-10-08

## 2024-10-21 ENCOUNTER — OFFICE VISIT (OUTPATIENT)
Dept: OBGYN | Age: 49
End: 2024-10-21
Payer: COMMERCIAL

## 2024-10-21 VITALS
WEIGHT: 194 LBS | BODY MASS INDEX: 31.18 KG/M2 | SYSTOLIC BLOOD PRESSURE: 134 MMHG | HEIGHT: 66 IN | DIASTOLIC BLOOD PRESSURE: 78 MMHG

## 2024-10-21 DIAGNOSIS — Z01.419 WELL WOMAN EXAM WITH ROUTINE GYNECOLOGICAL EXAM: Primary | ICD-10-CM

## 2024-10-21 DIAGNOSIS — Z12.31 ENCOUNTER FOR SCREENING MAMMOGRAM FOR MALIGNANT NEOPLASM OF BREAST: ICD-10-CM

## 2024-10-21 DIAGNOSIS — N81.11 CYSTOCELE, MIDLINE: ICD-10-CM

## 2024-10-21 PROCEDURE — 3075F SYST BP GE 130 - 139MM HG: CPT | Performed by: ADVANCED PRACTICE MIDWIFE

## 2024-10-21 PROCEDURE — 3078F DIAST BP <80 MM HG: CPT | Performed by: ADVANCED PRACTICE MIDWIFE

## 2024-10-21 PROCEDURE — 99396 PREV VISIT EST AGE 40-64: CPT | Performed by: ADVANCED PRACTICE MIDWIFE

## 2024-10-21 NOTE — PATIENT INSTRUCTIONS
SURVEY:    You may be receiving a survey regarding your visit today.    Please complete the survey to enable us to provide the highest quality of care to you and your family.    We strive for all 5's - thank you    If you cannot score us a very good (5) on any question, please call the office to discuss this with Cleo (our ). We would like to discuss how we could of made your experience a very good one.    Cleo: [phone]    Thank you.

## 2024-10-21 NOTE — PROGRESS NOTES
YEARLY PHYSICAL    Date of service: 10/21/2024    Li Melchor  Is a 49 y.o.  , female    PT's PCP is: Edward Caballero MD     : 1975                                             Subjective:       Patient's last menstrual period was 2024 (approximate).     Are your menses regular: no    OB History    Para Term  AB Living   3 3 3     3   SAB IAB Ectopic Molar Multiple Live Births             3      # Outcome Date GA Lbr Hudson/2nd Weight Sex Type Anes PTL Lv   3 Term  40w0d   M Vag-Spont   ELIN   2 Term  40w0d   F Vag-Spont   ELIN   1 Term  40w0d   F Vag-Spont   ELIN        Social History     Tobacco Use   Smoking Status Former    Types: Cigarettes   Smokeless Tobacco Never   Tobacco Comments    was a social smoker previously        Social History     Substance and Sexual Activity   Alcohol Use Not Currently    Comment: occ       Family History   Problem Relation Age of Onset    High Cholesterol Mother     Hypertension Mother     Diabetes Mother     Hypertension Father     Heart Disease Father 50    High Blood Pressure Father     High Cholesterol Father     High Cholesterol Sister     Hypertension Sister     High Blood Pressure Sister     Cancer Maternal Grandmother         colon    Heart Disease Maternal Grandmother     Breast Cancer Maternal Grandmother     Heart Surgery Maternal Grandfather     Heart Disease Maternal Grandfather     Cancer Paternal Grandmother     Heart Disease Paternal Grandfather        Any family history of breast or ovarian cancer: Yes, breast     Any family history of blood clots: No    Allergies: Latex      Current Outpatient Medications:     dulaglutide (TRULICITY) 1.5 MG/0.5ML SC injection, Inject 0.5 mLs into the skin once a week, Disp: 4 Adjustable Dose Pre-filled Pen Syringe, Rfl: 1    Lancets MISC, 1 each by Does not apply route 2 times daily, Disp: 300 each, Rfl: 1    atorvastatin

## 2024-11-05 ENCOUNTER — OFFICE VISIT (OUTPATIENT)
Dept: PRIMARY CARE CLINIC | Age: 49
End: 2024-11-05

## 2024-11-05 ENCOUNTER — HOSPITAL ENCOUNTER (OUTPATIENT)
Age: 49
Discharge: HOME | End: 2024-11-05
Payer: COMMERCIAL

## 2024-11-05 VITALS
HEIGHT: 66 IN | BODY MASS INDEX: 30.79 KG/M2 | WEIGHT: 191.6 LBS | DIASTOLIC BLOOD PRESSURE: 80 MMHG | SYSTOLIC BLOOD PRESSURE: 118 MMHG

## 2024-11-05 DIAGNOSIS — K57.92 DIVERTICULITIS: ICD-10-CM

## 2024-11-05 DIAGNOSIS — R10.31 RLQ ABDOMINAL PAIN: ICD-10-CM

## 2024-11-05 DIAGNOSIS — R10.9 ABDOMINAL PAIN, UNSPECIFIED ABDOMINAL LOCATION: ICD-10-CM

## 2024-11-05 DIAGNOSIS — R10.31 RLQ ABDOMINAL PAIN: Primary | ICD-10-CM

## 2024-11-05 DIAGNOSIS — K21.9 GASTROESOPHAGEAL REFLUX DISEASE WITHOUT ESOPHAGITIS: ICD-10-CM

## 2024-11-05 LAB
ALBUMIN SERPL-MCNC: 4.8 G/DL (ref 3.5–5.2)
ALBUMIN/GLOB SERPL: 1.9 {RATIO} (ref 1–2.5)
ALP SERPL-CCNC: 73 U/L (ref 35–104)
ALT SERPL-CCNC: 61 U/L (ref 10–35)
ANION GAP SERPL CALCULATED.3IONS-SCNC: 13 MMOL/L (ref 9–16)
AST SERPL-CCNC: 44 U/L (ref 10–35)
BASOPHILS # BLD: <0.03 K/UL (ref 0–0.2)
BASOPHILS NFR BLD: 0 % (ref 0–2)
BILIRUB SERPL-MCNC: 0.5 MG/DL (ref 0–1.2)
BILIRUB UR QL STRIP: NEGATIVE
BUN SERPL-MCNC: 11 MG/DL (ref 6–20)
BUN/CREAT SERPL: 16 (ref 9–20)
CALCIUM SERPL-MCNC: 10.5 MG/DL (ref 8.6–10.4)
CHLORIDE SERPL-SCNC: 101 MMOL/L (ref 98–107)
CLARITY UR: CLEAR
CO2 SERPL-SCNC: 24 MMOL/L (ref 20–31)
COLOR UR: YELLOW
CREAT SERPL-MCNC: 0.7 MG/DL (ref 0.5–0.9)
CRP SERPL HS-MCNC: <3 MG/L (ref 0–5)
EOSINOPHIL # BLD: 0.1 K/UL (ref 0–0.44)
EOSINOPHILS RELATIVE PERCENT: 1 % (ref 1–4)
EPI CELLS #/AREA URNS HPF: NORMAL /HPF (ref 0–25)
ERYTHROCYTE [DISTWIDTH] IN BLOOD BY AUTOMATED COUNT: 11.9 % (ref 11.8–14.4)
GFR, ESTIMATED: >90 ML/MIN/1.73M2
GLUCOSE SERPL-MCNC: 79 MG/DL (ref 74–99)
GLUCOSE UR STRIP-MCNC: NEGATIVE MG/DL
HCT VFR BLD AUTO: 43.7 % (ref 36.3–47.1)
HGB BLD-MCNC: 15.1 G/DL (ref 11.9–15.1)
HGB UR QL STRIP.AUTO: NEGATIVE
IMM GRANULOCYTES # BLD AUTO: <0.03 K/UL (ref 0–0.3)
IMM GRANULOCYTES NFR BLD: 0 %
KETONES UR STRIP-MCNC: NEGATIVE MG/DL
LEUKOCYTE ESTERASE UR QL STRIP: ABNORMAL
LYMPHOCYTES NFR BLD: 2.67 K/UL (ref 1.1–3.7)
LYMPHOCYTES RELATIVE PERCENT: 30 % (ref 24–43)
MCH RBC QN AUTO: 32.6 PG (ref 25.2–33.5)
MCHC RBC AUTO-ENTMCNC: 34.6 G/DL (ref 28.4–34.8)
MCV RBC AUTO: 94.4 FL (ref 82.6–102.9)
MONOCYTES NFR BLD: 0.75 K/UL (ref 0.1–1.2)
MONOCYTES NFR BLD: 8 % (ref 3–12)
NEUTROPHILS NFR BLD: 61 % (ref 36–65)
NEUTS SEG NFR BLD: 5.45 K/UL (ref 1.5–8.1)
NITRITE UR QL STRIP: NEGATIVE
NRBC BLD-RTO: 0 PER 100 WBC
PH UR STRIP: 6 [PH] (ref 5–9)
PLATELET # BLD AUTO: 229 K/UL (ref 138–453)
PMV BLD AUTO: 10 FL (ref 8.1–13.5)
POTASSIUM SERPL-SCNC: 4.3 MMOL/L (ref 3.7–5.3)
PROT SERPL-MCNC: 7.3 G/DL (ref 6.6–8.7)
PROT UR STRIP-MCNC: NEGATIVE MG/DL
RBC # BLD AUTO: 4.63 M/UL (ref 3.95–5.11)
RBC #/AREA URNS HPF: NORMAL /HPF (ref 0–2)
SODIUM SERPL-SCNC: 138 MMOL/L (ref 136–145)
SP GR UR STRIP: 1.01 (ref 1.01–1.02)
UROBILINOGEN UR STRIP-ACNC: NORMAL EU/DL (ref 0–1)
WBC #/AREA URNS HPF: NORMAL /HPF (ref 0–5)
WBC OTHER # BLD: 9 K/UL (ref 3.5–11.3)

## 2024-11-05 PROCEDURE — 36415 COLL VENOUS BLD VENIPUNCTURE: CPT

## 2024-11-05 PROCEDURE — 86140 C-REACTIVE PROTEIN: CPT

## 2024-11-05 PROCEDURE — 81001 URINALYSIS AUTO W/SCOPE: CPT

## 2024-11-05 PROCEDURE — 80053 COMPREHEN METABOLIC PANEL: CPT

## 2024-11-05 PROCEDURE — 85025 COMPLETE CBC W/AUTO DIFF WBC: CPT

## 2024-11-05 RX ORDER — PANTOPRAZOLE SODIUM 40 MG/1
40 TABLET, DELAYED RELEASE ORAL
Qty: 30 TABLET | Refills: 0 | Status: SHIPPED | OUTPATIENT
Start: 2024-11-05

## 2024-11-05 NOTE — PROGRESS NOTES
counseling on the following healthy behaviors: nutrition, exercise and medication adherence. I encouraged and discussed lifestyle modifications including diet and exercise (150+ minutes of moderate-high intensity) and the patient was agreeable to making positive/beneficial changes to both to help improve their overall health. Discussed use, benefit, and side effects of prescribed medications.  Barriers to medication compliance addressed. Patient given educational materials: see patient instructions.     HM - HM items completed today as per orders. Outstanding HM items though not limited to immunizations were discussed with the patient today, including risks, benefits and alternatives. The patient will discuss these during the next appointment per their preference. If there are any worsening or concerning signs or symptoms, patient will report to the ED and/or contact [id number] for immediate evaluation. Teach back method was used.     Subjective:    HPI  Chief Complaint   Patient presents with    Abdominal Pain    Nausea     Abdominal Pain  Patient complains of abdominal pain. The pain is described as cramping, and is 10/10 in intensity. Pain is located in the RUQ and right flank with radiation to the right back. Onset was ongoing occurring 3 days ago. Symptoms have been gradually worsening since. Aggravating factors: fatty foods and spicy foods.  Alleviating factors: eating. Associated symptoms: belching. The patient denies fever. Risk factors for pelvic/abdominal pain include hx of diverticulitis. Patient is taking famotidine at night. Currently it is 4/10. Cipro/Flagyl made it worse in the past.    History of Present Illness    Health Maintenance -   Alcohol/Substance use History - None/Minimal    Tobacco Use      Smoking status: Former        Types: Cigarettes      Smokeless tobacco: Never      Tobacco comments: was a social smoker previously    Family History   Problem Relation Age of Onset    High Cholesterol

## 2024-11-29 DIAGNOSIS — K21.9 GASTROESOPHAGEAL REFLUX DISEASE WITHOUT ESOPHAGITIS: ICD-10-CM

## 2024-12-02 RX ORDER — PANTOPRAZOLE SODIUM 40 MG/1
TABLET, DELAYED RELEASE ORAL
Qty: 90 TABLET | Refills: 0 | Status: SHIPPED | OUTPATIENT
Start: 2024-12-02

## 2024-12-09 ENCOUNTER — OFFICE VISIT (OUTPATIENT)
Dept: PRIMARY CARE CLINIC | Age: 49
End: 2024-12-09

## 2024-12-09 ENCOUNTER — HOSPITAL ENCOUNTER (OUTPATIENT)
Age: 49
Discharge: HOME | End: 2024-12-09
Payer: COMMERCIAL

## 2024-12-09 VITALS
DIASTOLIC BLOOD PRESSURE: 70 MMHG | HEART RATE: 103 BPM | OXYGEN SATURATION: 96 % | WEIGHT: 190 LBS | BODY MASS INDEX: 30.67 KG/M2 | TEMPERATURE: 95.7 F | SYSTOLIC BLOOD PRESSURE: 120 MMHG

## 2024-12-09 DIAGNOSIS — R07.9 CHEST PAIN, UNSPECIFIED TYPE: ICD-10-CM

## 2024-12-09 DIAGNOSIS — R05.9 COUGH, UNSPECIFIED TYPE: Primary | ICD-10-CM

## 2024-12-09 DIAGNOSIS — E11.9 TYPE 2 DIABETES MELLITUS WITHOUT COMPLICATION, WITHOUT LONG-TERM CURRENT USE OF INSULIN (HCC): ICD-10-CM

## 2024-12-09 LAB
EKG ATRIAL RATE: 83 BPM
EKG P AXIS: 14 DEGREES
EKG P-R INTERVAL: 152 MS
EKG Q-T INTERVAL: 360 MS
EKG QRS DURATION: 80 MS
EKG QTC CALCULATION (BAZETT): 423 MS
EKG R AXIS: 48 DEGREES
EKG T AXIS: 89 DEGREES
EKG VENTRICULAR RATE: 83 BPM
INFLUENZA A ANTIGEN, POC: NEGATIVE
INFLUENZA B ANTIGEN, POC: NEGATIVE
LOT EXPIRE DATE: NORMAL
LOT KIT NUMBER: NORMAL
RSV RNA: NORMAL
SARS-COV-2 RNA, POC: NEGATIVE
VALID INTERNAL CONTROL: NORMAL
VENDOR AND KIT NAME POC: NORMAL

## 2024-12-09 PROCEDURE — 93010 ELECTROCARDIOGRAM REPORT: CPT | Performed by: INTERNAL MEDICINE

## 2024-12-09 PROCEDURE — 93005 ELECTROCARDIOGRAM TRACING: CPT

## 2024-12-09 RX ORDER — PREDNISONE 20 MG/1
20 TABLET ORAL 2 TIMES DAILY
Qty: 10 TABLET | Refills: 0 | Status: SHIPPED | OUTPATIENT
Start: 2024-12-09 | End: 2024-12-14

## 2024-12-09 RX ORDER — ALBUTEROL SULFATE 90 UG/1
2 INHALANT RESPIRATORY (INHALATION) EVERY 4 HOURS PRN
Qty: 18 G | Refills: 1 | Status: SHIPPED | OUTPATIENT
Start: 2024-12-09

## 2024-12-09 NOTE — PROGRESS NOTES
Name: Li Melchor  : 1975         Chief Complaint:     Chief Complaint   Patient presents with   • Cough     -cough  -laryngitis  -sinus drainage  -chest is heavy  -started 3 days ago   -feeling of irregular heartbeat at times       History of Present Illness:      Li Melchor is a 49 y.o.  female who presents with Cough (-cough/-laryngitis/-sinus drainage/-chest is heavy/-started 3 days ago /-feeling of irregular heartbeat at times)      HPI      Past Medical History:     Past Medical History:   Diagnosis Date   • Allergic rhinitis 1995   • Anxiety 2022   • Asthma 2011   • COVID-19 2020   • Elevated liver enzymes 2011   • Fatty liver disease, nonalcoholic 2019   • Gastroesophageal reflux disease without esophagitis 2024   • Rufus's syndrome    • Hyperlipidemia 2011   • Hypertension 2011    h/o now controlled   • Migraine    • Obesity due to excess calories 2017   • PIH (pregnancy induced hypertension) 2008   • Pneumonia 2009    h/o   • Type 2 diabetes mellitus without complication (HCC)    • Urinary incontinence       Reviewed all health maintenance requirements and ordered appropriate tests  Health Maintenance Due   Topic Date Due   • Hepatitis B vaccine (1 of 3 - 19+ 3-dose series) Never done   • Pneumococcal 0-64 years Vaccine (2 of 2 - PCV) 2012   • Diabetic Alb to Cr ratio (uACR) test  2024   • Flu vaccine (1) 2024   • COVID-19 Vaccine ( season) 2024       Past Surgical History:     Past Surgical History:   Procedure Laterality Date   • CYSTOURETHROSCOPY  2014    Dr. Kapoor   • DILATION AND CURETTAGE OF UTERUS  14 [facility]    Hysteroscopy novasure ablation anterior repair and urethral sling, Dr. Sosa   • ENDOMETRIAL ABLATION     • WISDOM TOOTH EXTRACTION          Medications:       Prior to Admission medications    Medication Sig Start Date End Date Taking? Authorizing Provider 
tablet by mouth 2 times daily for 5 days    albuterol sulfate HFA (VENTOLIN HFA) 108 (90 Base) MCG/ACT inhaler 18 g 1     Sig: Inhale 2 puffs into the lungs every 4 hours as needed for Wheezing or Shortness of Breath       Orders Placed This Encounter   Procedures    AMB POC SARS-COV-2 AND INFLUENZA A/B    POCT RSV Molecular    EKG 12 lead     Standing Status:   Future     Number of Occurrences:   1     Standing Expiration Date:   2/7/2025     Order Specific Question:   Reason for Exam?     Answer:   Chest pain        No results found for this visit on 12/09/24.    Return in about 1 week (around 12/16/2024), or if symptoms worsen or fail to improve, for chest pain + palpitation f/u .    Electronically signed by DYAN Drummond CNP on 12/10/24 at 6:45 AM.

## 2025-01-10 ENCOUNTER — HOSPITAL ENCOUNTER (OUTPATIENT)
Dept: WOMENS IMAGING | Age: 50
Discharge: HOME | End: 2025-01-12
Attending: ADVANCED PRACTICE MIDWIFE
Payer: COMMERCIAL

## 2025-01-10 VITALS — HEIGHT: 65 IN | WEIGHT: 190 LBS | BODY MASS INDEX: 31.65 KG/M2

## 2025-01-10 DIAGNOSIS — Z12.31 ENCOUNTER FOR SCREENING MAMMOGRAM FOR MALIGNANT NEOPLASM OF BREAST: ICD-10-CM

## 2025-01-10 PROCEDURE — 77063 BREAST TOMOSYNTHESIS BI: CPT

## 2025-02-13 RX ORDER — DULAGLUTIDE 1.5 MG/.5ML
INJECTION, SOLUTION SUBCUTANEOUS
Qty: 2 ML | Refills: 3 | Status: SHIPPED | OUTPATIENT
Start: 2025-02-13 | End: 2025-03-05

## 2025-03-04 ENCOUNTER — HOSPITAL ENCOUNTER (OUTPATIENT)
Age: 50
Discharge: HOME | End: 2025-03-04
Payer: COMMERCIAL

## 2025-03-04 ENCOUNTER — OFFICE VISIT (OUTPATIENT)
Dept: OBGYN | Age: 50
End: 2025-03-04

## 2025-03-04 VITALS
DIASTOLIC BLOOD PRESSURE: 68 MMHG | SYSTOLIC BLOOD PRESSURE: 116 MMHG | BODY MASS INDEX: 32.49 KG/M2 | HEIGHT: 65 IN | WEIGHT: 195 LBS

## 2025-03-04 DIAGNOSIS — E11.9 TYPE 2 DIABETES MELLITUS WITHOUT COMPLICATION, WITHOUT LONG-TERM CURRENT USE OF INSULIN (HCC): ICD-10-CM

## 2025-03-04 DIAGNOSIS — N95.1 PERIMENOPAUSAL: ICD-10-CM

## 2025-03-04 DIAGNOSIS — R39.14 FEELING OF INCOMPLETE BLADDER EMPTYING: ICD-10-CM

## 2025-03-04 DIAGNOSIS — N81.11 CYSTOCELE, MIDLINE: ICD-10-CM

## 2025-03-04 DIAGNOSIS — N81.4 UTEROVAGINAL PROLAPSE: Primary | ICD-10-CM

## 2025-03-04 DIAGNOSIS — E78.2 MIXED HYPERLIPIDEMIA: ICD-10-CM

## 2025-03-04 DIAGNOSIS — I10 PRIMARY HYPERTENSION: ICD-10-CM

## 2025-03-04 DIAGNOSIS — R35.0 URINARY FREQUENCY: ICD-10-CM

## 2025-03-04 LAB
ALBUMIN SERPL-MCNC: 4.8 G/DL (ref 3.5–5.2)
ALBUMIN/GLOB SERPL: 1.9 {RATIO} (ref 1–2.5)
ALP SERPL-CCNC: 68 U/L (ref 35–104)
ALT SERPL-CCNC: 56 U/L (ref 10–35)
ANION GAP SERPL CALCULATED.3IONS-SCNC: 13 MMOL/L (ref 9–16)
AST SERPL-CCNC: 50 U/L (ref 10–35)
BASOPHILS # BLD: <0.03 K/UL (ref 0–0.2)
BASOPHILS NFR BLD: 0 % (ref 0–2)
BILIRUB SERPL-MCNC: 0.4 MG/DL (ref 0–1.2)
BUN SERPL-MCNC: 11 MG/DL (ref 6–20)
BUN/CREAT SERPL: 18 (ref 9–20)
CALCIUM SERPL-MCNC: 10 MG/DL (ref 8.6–10.4)
CHLORIDE SERPL-SCNC: 99 MMOL/L (ref 98–107)
CHOLEST SERPL-MCNC: 166 MG/DL (ref 0–199)
CHOLESTEROL/HDL RATIO: 3
CO2 SERPL-SCNC: 25 MMOL/L (ref 20–31)
CREAT SERPL-MCNC: 0.6 MG/DL (ref 0.5–0.9)
EOSINOPHIL # BLD: 0.14 K/UL (ref 0–0.44)
EOSINOPHILS RELATIVE PERCENT: 2 % (ref 1–4)
ERYTHROCYTE [DISTWIDTH] IN BLOOD BY AUTOMATED COUNT: 12.9 % (ref 11.8–14.4)
GFR, ESTIMATED: >90 ML/MIN/1.73M2
GLUCOSE SERPL-MCNC: 78 MG/DL (ref 74–99)
HCT VFR BLD AUTO: 42.9 % (ref 36.3–47.1)
HDLC SERPL-MCNC: 56 MG/DL
HGB BLD-MCNC: 14.9 G/DL (ref 11.9–15.1)
IMM GRANULOCYTES # BLD AUTO: 0.03 K/UL (ref 0–0.3)
IMM GRANULOCYTES NFR BLD: 0 %
LDLC SERPL CALC-MCNC: 61 MG/DL (ref 0–100)
LYMPHOCYTES NFR BLD: 2.81 K/UL (ref 1.1–3.7)
LYMPHOCYTES RELATIVE PERCENT: 33 % (ref 24–43)
MCH RBC QN AUTO: 33.2 PG (ref 25.2–33.5)
MCHC RBC AUTO-ENTMCNC: 34.7 G/DL (ref 28.4–34.8)
MCV RBC AUTO: 95.5 FL (ref 82.6–102.9)
MONOCYTES NFR BLD: 0.75 K/UL (ref 0.1–1.2)
MONOCYTES NFR BLD: 9 % (ref 3–12)
NEUTROPHILS NFR BLD: 56 % (ref 36–65)
NEUTS SEG NFR BLD: 4.9 K/UL (ref 1.5–8.1)
NRBC BLD-RTO: 0 PER 100 WBC
PLATELET # BLD AUTO: 199 K/UL (ref 138–453)
PMV BLD AUTO: 10.2 FL (ref 8.1–13.5)
POTASSIUM SERPL-SCNC: 4.1 MMOL/L (ref 3.7–5.3)
PROT SERPL-MCNC: 7.4 G/DL (ref 6.6–8.7)
RBC # BLD AUTO: 4.49 M/UL (ref 3.95–5.11)
SODIUM SERPL-SCNC: 137 MMOL/L (ref 136–145)
TRIGL SERPL-MCNC: 245 MG/DL
VLDLC SERPL CALC-MCNC: 49 MG/DL (ref 1–30)
WBC OTHER # BLD: 8.7 K/UL (ref 3.5–11.3)

## 2025-03-04 PROCEDURE — 80053 COMPREHEN METABOLIC PANEL: CPT

## 2025-03-04 PROCEDURE — 36415 COLL VENOUS BLD VENIPUNCTURE: CPT

## 2025-03-04 PROCEDURE — 80061 LIPID PANEL: CPT

## 2025-03-04 PROCEDURE — 83036 HEMOGLOBIN GLYCOSYLATED A1C: CPT

## 2025-03-04 PROCEDURE — 85025 COMPLETE CBC W/AUTO DIFF WBC: CPT

## 2025-03-05 ENCOUNTER — OFFICE VISIT (OUTPATIENT)
Dept: PRIMARY CARE CLINIC | Age: 50
End: 2025-03-05
Payer: COMMERCIAL

## 2025-03-05 VITALS
HEIGHT: 66 IN | WEIGHT: 193 LBS | SYSTOLIC BLOOD PRESSURE: 120 MMHG | HEART RATE: 72 BPM | DIASTOLIC BLOOD PRESSURE: 82 MMHG | OXYGEN SATURATION: 99 % | BODY MASS INDEX: 31.02 KG/M2

## 2025-03-05 DIAGNOSIS — E11.9 TYPE 2 DIABETES MELLITUS WITHOUT COMPLICATION, WITHOUT LONG-TERM CURRENT USE OF INSULIN (HCC): Primary | ICD-10-CM

## 2025-03-05 DIAGNOSIS — K59.00 CONSTIPATION, UNSPECIFIED CONSTIPATION TYPE: ICD-10-CM

## 2025-03-05 DIAGNOSIS — I10 PRIMARY HYPERTENSION: ICD-10-CM

## 2025-03-05 DIAGNOSIS — E78.2 MIXED HYPERLIPIDEMIA: ICD-10-CM

## 2025-03-05 LAB
EST. AVERAGE GLUCOSE BLD GHB EST-MCNC: 114 MG/DL
HBA1C MFR BLD: 5.6 % (ref 4–6)

## 2025-03-05 PROCEDURE — 3079F DIAST BP 80-89 MM HG: CPT | Performed by: STUDENT IN AN ORGANIZED HEALTH CARE EDUCATION/TRAINING PROGRAM

## 2025-03-05 PROCEDURE — 99214 OFFICE O/P EST MOD 30 MIN: CPT | Performed by: STUDENT IN AN ORGANIZED HEALTH CARE EDUCATION/TRAINING PROGRAM

## 2025-03-05 PROCEDURE — G2211 COMPLEX E/M VISIT ADD ON: HCPCS | Performed by: STUDENT IN AN ORGANIZED HEALTH CARE EDUCATION/TRAINING PROGRAM

## 2025-03-05 PROCEDURE — 3044F HG A1C LEVEL LT 7.0%: CPT | Performed by: STUDENT IN AN ORGANIZED HEALTH CARE EDUCATION/TRAINING PROGRAM

## 2025-03-05 PROCEDURE — 3074F SYST BP LT 130 MM HG: CPT | Performed by: STUDENT IN AN ORGANIZED HEALTH CARE EDUCATION/TRAINING PROGRAM

## 2025-03-05 RX ORDER — LUBIPROSTONE 8 UG/1
8 CAPSULE ORAL DAILY
Qty: 30 CAPSULE | Refills: 0 | Status: SHIPPED | OUTPATIENT
Start: 2025-03-05

## 2025-03-05 ASSESSMENT — PATIENT HEALTH QUESTIONNAIRE - PHQ9
SUM OF ALL RESPONSES TO PHQ QUESTIONS 1-9: 0
SUM OF ALL RESPONSES TO PHQ QUESTIONS 1-9: 0
1. LITTLE INTEREST OR PLEASURE IN DOING THINGS: NOT AT ALL
SUM OF ALL RESPONSES TO PHQ QUESTIONS 1-9: 0
2. FEELING DOWN, DEPRESSED OR HOPELESS: NOT AT ALL
SUM OF ALL RESPONSES TO PHQ QUESTIONS 1-9: 0

## 2025-03-05 NOTE — PROGRESS NOTES
moderate-high intensity) and the patient was agreeable to making positive/beneficial changes to both to help improve their overall health. Discussed use, benefit, and side effects of prescribed medications.  Barriers to medication compliance addressed. Patient given educational materials: see patient instructions.     HM - HM items completed today as per orders. Outstanding HM items though not limited to immunizations were discussed with the patient today, including risks, benefits and alternatives. The patient will discuss these during the next appointment per their preference. If there are any worsening or concerning signs or symptoms, patient will report to the ED and/or contact [id number] for immediate evaluation. Teach back method was used.     Subjective:    HPI  Chief Complaint   Patient presents with    Hypertension    Diabetes     Diabetes Mellitus Type 2: Current symptoms/problems include none.  Home blood sugar records: patient does not test  Any episodes of hypoglycemia? no  Eye exam current (within one year): yes  Tobacco history: She  reports that she has quit smoking. Her smoking use included cigarettes. She has never used smokeless tobacco.   Daily Aspirin? Yes  Last A1c was 5.6% on 3/4/2025    Hypertension:  Home blood pressure monitoring: No.  She is adherent to a low sodium diet. Patient denies chest pain.  Antihypertensive medication side effects: no medication side effects noted.  Use of agents associated with hypertension: none. She did have a prior ECG that was reviewed. Stress/ECHO had been reviewed. Her chest wall has since resolved. She went to the chiropractor and the pain has since resolved.    Hyperlipidemia:  No new myalgias or GI upset on atorvastatin (Lipitor).     Lab Results   Component Value Date    CHOL 166 03/04/2025    TRIG 245 (H) 03/04/2025    HDL 56 03/04/2025    LDLDIRECT 176 (H) 07/12/2023     Lab Results   Component Value Date    ALT 56 (H) 03/04/2025    AST 50 (H) 03/04/2025

## 2025-03-28 NOTE — PROGRESS NOTES
Attempted to call patient and had to leave a message.  Left available dates for November.  Patient to call back with her decision.  
 Negative for chills and fever.   Genitourinary:  Positive for difficulty urinating (incomplete emptying?), frequency, menstrual problem (no cycle x 6 mos) and vaginal pain (bulge). Negative for dysuria, pelvic pain and urgency.       PHYSICAL EXAM:  /68   Ht 1.651 m (5' 5\")   Wt 88.5 kg (195 lb)   BMI 32.45 kg/m²   Physical Exam  Constitutional:       Appearance: Normal appearance.   Genitourinary:      Vulva normal.      Right Labia: No rash or lesions.     Left Labia: No lesions or rash.     Anterior and apical vaginal prolapse present.     No vaginal atrophy present.  HENT:      Head: Normocephalic and atraumatic.   Eyes:      Extraocular Movements: Extraocular movements intact.      Pupils: Pupils are equal, round, and reactive to light.   Cardiovascular:      Rate and Rhythm: Normal rate.   Pulmonary:      Effort: Pulmonary effort is normal.   Musculoskeletal:         General: Normal range of motion.   Neurological:      Mental Status: She is alert and oriented to person, place, and time.   Skin:     General: Skin is warm and dry.   Psychiatric:         Mood and Affect: Mood normal.         Behavior: Behavior normal.         ASSESSMENT:      1. Uterovaginal prolapse    2. Feeling of incomplete bladder emptying    3. Cystocele, midline    4. Perimenopausal    5. Urinary frequency        PLAN:  No orders of the defined types were placed in this encounter.    Return for  to coordinate.       Electronically signed by Sravanthi Knott DO on 04/12/25

## 2025-04-15 DIAGNOSIS — E78.2 MIXED HYPERLIPIDEMIA: ICD-10-CM

## 2025-04-15 DIAGNOSIS — F41.9 ANXIETY: ICD-10-CM

## 2025-04-16 DIAGNOSIS — N81.4 UTEROVAGINAL PROLAPSE: ICD-10-CM

## 2025-04-16 DIAGNOSIS — I10 PRIMARY HYPERTENSION: ICD-10-CM

## 2025-04-16 RX ORDER — EZETIMIBE 10 MG/1
10 TABLET ORAL DAILY
Qty: 90 TABLET | Refills: 1 | Status: SHIPPED | OUTPATIENT
Start: 2025-04-16

## 2025-04-16 RX ORDER — ATORVASTATIN CALCIUM 40 MG/1
40 TABLET, FILM COATED ORAL DAILY
Qty: 90 TABLET | Refills: 1 | Status: SHIPPED | OUTPATIENT
Start: 2025-04-16

## 2025-04-16 RX ORDER — METOPROLOL SUCCINATE 25 MG/1
25 TABLET, EXTENDED RELEASE ORAL DAILY
Qty: 90 TABLET | Refills: 1 | Status: SHIPPED | OUTPATIENT
Start: 2025-04-16

## 2025-04-21 DIAGNOSIS — N81.4 UTEROVAGINAL PROLAPSE: ICD-10-CM

## 2025-04-21 DIAGNOSIS — Z01.812 ENCOUNTER FOR PRE-OPERATIVE LABORATORY TESTING: Primary | ICD-10-CM

## 2025-04-21 DIAGNOSIS — N81.11 CYSTOCELE, MIDLINE: ICD-10-CM

## 2025-04-21 DIAGNOSIS — I10 ESSENTIAL HYPERTENSION, BENIGN: ICD-10-CM

## 2025-05-16 ENCOUNTER — TELEPHONE (OUTPATIENT)
Dept: OBGYN | Age: 50
End: 2025-05-16

## 2025-05-16 DIAGNOSIS — F41.9 ANXIETY: Primary | ICD-10-CM

## 2025-05-19 NOTE — TELEPHONE ENCOUNTER
Pt called in stating she is having an increase in depression due to marital situations and was wondering what would help.  We increased zoloft and discussed unisom at bedtime.

## 2025-05-28 ENCOUNTER — HOSPITAL ENCOUNTER (OUTPATIENT)
Age: 50
Setting detail: SPECIMEN
Discharge: HOME | End: 2025-05-28
Payer: COMMERCIAL

## 2025-05-28 ENCOUNTER — OFFICE VISIT (OUTPATIENT)
Dept: OBGYN | Age: 50
End: 2025-05-28
Payer: COMMERCIAL

## 2025-05-28 ENCOUNTER — RESULTS FOLLOW-UP (OUTPATIENT)
Dept: OBGYN | Age: 50
End: 2025-05-28

## 2025-05-28 VITALS
DIASTOLIC BLOOD PRESSURE: 82 MMHG | SYSTOLIC BLOOD PRESSURE: 128 MMHG | BODY MASS INDEX: 30.37 KG/M2 | WEIGHT: 189 LBS | HEIGHT: 66 IN

## 2025-05-28 DIAGNOSIS — N92.1 MENORRHAGIA WITH IRREGULAR CYCLE: ICD-10-CM

## 2025-05-28 DIAGNOSIS — N95.1 PERIMENOPAUSAL: ICD-10-CM

## 2025-05-28 DIAGNOSIS — Z11.3 SCREEN FOR STD (SEXUALLY TRANSMITTED DISEASE): ICD-10-CM

## 2025-05-28 DIAGNOSIS — Z20.2 STD EXPOSURE: ICD-10-CM

## 2025-05-28 DIAGNOSIS — Z20.2 STD EXPOSURE: Primary | ICD-10-CM

## 2025-05-28 LAB
HAV IGM SERPL QL IA: NONREACTIVE
HBV CORE IGM SERPL QL IA: NONREACTIVE
HBV SURFACE AG SERPL QL IA: NONREACTIVE
HCV AB SERPL QL IA: NONREACTIVE
HIV 1+2 AB+HIV1 P24 AG SERPL QL IA: NONREACTIVE
T PALLIDUM AB SER QL IA: NONREACTIVE

## 2025-05-28 PROCEDURE — 87591 N.GONORRHOEAE DNA AMP PROB: CPT

## 2025-05-28 PROCEDURE — 86694 HERPES SIMPLEX NES ANTBDY: CPT

## 2025-05-28 PROCEDURE — 80074 ACUTE HEPATITIS PANEL: CPT

## 2025-05-28 PROCEDURE — 3074F SYST BP LT 130 MM HG: CPT | Performed by: ADVANCED PRACTICE MIDWIFE

## 2025-05-28 PROCEDURE — 87491 CHLMYD TRACH DNA AMP PROBE: CPT

## 2025-05-28 PROCEDURE — 86696 HERPES SIMPLEX TYPE 2 TEST: CPT

## 2025-05-28 PROCEDURE — 36415 COLL VENOUS BLD VENIPUNCTURE: CPT | Performed by: ADVANCED PRACTICE MIDWIFE

## 2025-05-28 PROCEDURE — 99213 OFFICE O/P EST LOW 20 MIN: CPT | Performed by: ADVANCED PRACTICE MIDWIFE

## 2025-05-28 PROCEDURE — 87389 HIV-1 AG W/HIV-1&-2 AB AG IA: CPT

## 2025-05-28 PROCEDURE — 3079F DIAST BP 80-89 MM HG: CPT | Performed by: ADVANCED PRACTICE MIDWIFE

## 2025-05-28 PROCEDURE — 86780 TREPONEMA PALLIDUM: CPT

## 2025-05-28 PROCEDURE — 86695 HERPES SIMPLEX TYPE 1 TEST: CPT

## 2025-05-28 RX ORDER — TRANEXAMIC ACID 650 MG/1
1300 TABLET ORAL 3 TIMES DAILY
Qty: 30 TABLET | Refills: 12 | Status: SHIPPED | OUTPATIENT
Start: 2025-05-28 | End: 2025-08-01

## 2025-05-28 SDOH — ECONOMIC STABILITY: TRANSPORTATION INSECURITY
IN THE PAST 12 MONTHS, HAS THE LACK OF TRANSPORTATION KEPT YOU FROM MEDICAL APPOINTMENTS OR FROM GETTING MEDICATIONS?: NO

## 2025-05-28 SDOH — ECONOMIC STABILITY: FOOD INSECURITY: WITHIN THE PAST 12 MONTHS, YOU WORRIED THAT YOUR FOOD WOULD RUN OUT BEFORE YOU GOT MONEY TO BUY MORE.: NEVER TRUE

## 2025-05-28 SDOH — ECONOMIC STABILITY: INCOME INSECURITY: IN THE LAST 12 MONTHS, WAS THERE A TIME WHEN YOU WERE NOT ABLE TO PAY THE MORTGAGE OR RENT ON TIME?: NO

## 2025-05-28 SDOH — ECONOMIC STABILITY: FOOD INSECURITY: WITHIN THE PAST 12 MONTHS, THE FOOD YOU BOUGHT JUST DIDN'T LAST AND YOU DIDN'T HAVE MONEY TO GET MORE.: NEVER TRUE

## 2025-05-28 NOTE — PROGRESS NOTES
PROBLEM VISIT     Date of service: 2025    Li Melchor  Is a 50 y.o. , female    PT's PCP is: Edward Caballero MD     : 1975                                             Subjective:       Patient's last menstrual period was 2025 (approximate).     OB History    Para Term  AB Living   3 3 3   3   SAB IAB Ectopic Molar Multiple Live Births        3      # Outcome Date GA Lbr Hudson/2nd Weight Sex Type Anes PTL Lv   3 Term  40w0d   M Vag-Spont   ELIN   2 Term  40w0d   F Vag-Spont   ELIN   1 Term  40w0d   F Vag-Spont   ELIN        Social History     Tobacco Use   Smoking Status Former    Current packs/day: 0.00    Types: Cigarettes    Quit date:     Years since quitting: 10.4    Passive exposure: Past   Smokeless Tobacco Never   Tobacco Comments    was a social smoker previously        Social History     Substance and Sexual Activity   Alcohol Use Not Currently    Comment: occ       Social History     Substance and Sexual Activity   Sexual Activity Not Currently    Partners: Male    Comment: ablation        Allergies: Latex    Chief Complaint   Patient presents with    Exposure to STD     Patient is here today for STD testing, pt would like labs preformed as well.     Procedure     Patient needs to reschedule surgery with Dr Seth please inform Ana.        Last Yearly date:  10/21/24    Last pap date and results: 10/11/22(-)    Last HPV date and results: 10/31/19(-)    PE:  Vital Signs  Blood pressure 128/82, height 1.676 m (5' 6\"), weight 85.7 kg (189 lb), last menstrual period 2025, not currently breastfeeding.  Estimated body mass index is 30.51 kg/m² as calculated from the following:    Height as of this encounter: 1.676 m (5' 6\").    Weight as of this encounter: 85.7 kg (189 lb).    No data recorded      NURSE: Ernesto PETERS    HPI: Patient here today for STD screening.  Patient states she may have been exposed and like would like assessed    Yes  PT

## 2025-05-29 LAB
C TRACH DNA SPEC QL PROBE+SIG AMP: NEGATIVE
HSV1 IGG SERPL QL IA: 1.02
HSV1+2 IGM SER QL IA: 0.51
HSV2 AB SER QL IA: 0.04
N GONORRHOEA DNA SPEC QL PROBE+SIG AMP: NEGATIVE
SPECIMEN DESCRIPTION: NORMAL

## 2025-07-07 RX ORDER — DULAGLUTIDE 1.5 MG/.5ML
INJECTION, SOLUTION SUBCUTANEOUS
Qty: 2 ML | Refills: 3 | Status: SHIPPED | OUTPATIENT
Start: 2025-07-07